# Patient Record
Sex: FEMALE | Race: WHITE | NOT HISPANIC OR LATINO | Employment: OTHER | URBAN - METROPOLITAN AREA
[De-identification: names, ages, dates, MRNs, and addresses within clinical notes are randomized per-mention and may not be internally consistent; named-entity substitution may affect disease eponyms.]

---

## 2017-08-14 ENCOUNTER — APPOINTMENT (OUTPATIENT)
Dept: RADIOLOGY | Facility: MEDICAL CENTER | Age: 57
End: 2017-08-14
Attending: EMERGENCY MEDICINE
Payer: MEDICARE

## 2017-08-14 ENCOUNTER — HOSPITAL ENCOUNTER (EMERGENCY)
Facility: MEDICAL CENTER | Age: 57
End: 2017-08-14
Attending: EMERGENCY MEDICINE
Payer: MEDICARE

## 2017-08-14 VITALS
SYSTOLIC BLOOD PRESSURE: 120 MMHG | BODY MASS INDEX: 24.56 KG/M2 | DIASTOLIC BLOOD PRESSURE: 89 MMHG | RESPIRATION RATE: 17 BRPM | OXYGEN SATURATION: 94 % | WEIGHT: 130.07 LBS | HEIGHT: 61 IN | TEMPERATURE: 98.4 F | HEART RATE: 83 BPM

## 2017-08-14 DIAGNOSIS — M54.50 RIGHT-SIDED LOW BACK PAIN WITHOUT SCIATICA, UNSPECIFIED CHRONICITY: ICD-10-CM

## 2017-08-14 PROCEDURE — 99283 EMERGENCY DEPT VISIT LOW MDM: CPT

## 2017-08-14 PROCEDURE — 71020 DX-CHEST-2 VIEWS: CPT

## 2017-08-14 ASSESSMENT — PAIN SCALES - GENERAL: PAINLEVEL_OUTOF10: 6

## 2017-08-14 NOTE — ED NOTES
"Pt amb to triage.  Chief Complaint   Patient presents with   • Back Pain     rt sided, mid back, intermittent xmonths, worse w/ palpation, \"I feel like there's a lump on my back.\"   • Weight Loss     progressive x7mo, after stopping prednisone, \"I've lost 50lbs in the past 7mo.\"     Pt asking if she can go out to smoke while waiting in the lobby. Pt encouraged to remain in waiting room. Triage process reviewed w/ pt.  "

## 2017-08-14 NOTE — ED PROVIDER NOTES
"ED Provider Note    CHIEF COMPLAINT  Chief Complaint   Patient presents with   • Back Pain     rt sided, mid back, intermittent xmonths, worse w/ palpation, \"I feel like there's a lump on my back.\"   • Weight Loss     progressive x7mo, after stopping prednisone, \"I've lost 50lbs in the past 7mo.\"       HPI  Sanam Andre is a 57 y.o. female who presents for evaluation of back pain.  The patient states that she's been having intermittent pain in her right lower posterior chest/rib area over the last couple months.  She states at times she feels like she feels a lesion or a cystic structure in that area.  Patient does have rheumatoid arthritis along with hepatitis C.  She indicates that she did have laboratory studies performed recently that were reviewed by her rheumatologist, Dr. Blackwood.  She was told that she had slight elevation of calcium as well as liver function tests.  The patient states she has had weight loss after she stopped using prednisone.  The patient denies: Fever, chills, cough, sputum, hemoptysis, vomiting, hematemesis, melena hematochezia, hematuria, dysuria.  No other acute symptomatology or complaints.    REVIEW OF SYSTEMS  See HPI for further details. All other systems negative.    PAST MEDICAL HISTORY  Past Medical History   Diagnosis Date   • Rheumatoid arthritis    • Hepatitis C    • Hypertension    • Arrhythmia      hx of left bundle branch block   • Pain      knees 7/10       FAMILY HISTORY  No family history on file.    SOCIAL HISTORY  Positive tobacco use; positive alcohol use;    SURGICAL HISTORY  Past Surgical History   Procedure Laterality Date   • Gyn surgery  2000     tubal ligation   • Other  1976     bunionectomy   • Knee arthroplasty total  9/15/2010     Performed by [facility] at SURGERY [facility]S       CURRENT MEDICATIONS  See nurses notes    ALLERGIES  Allergies   Allergen Reactions   • Codeine Vomiting   • Iodine Anaphylaxis     Reaction took place during " "CT and IV dye injection       PHYSICAL EXAM  VITAL SIGNS: /69 mmHg  Pulse 93  Temp(Src) 36.5 °C (97.7 °F)  Resp 18  Ht 1.549 m (5' 1\")  Wt 59 kg (130 lb 1.1 oz)  BMI 24.59 kg/m2  SpO2 94%   Constitutional: 57-year-old female, Well developed, Well nourished, No acute distress, Non-toxic appearance.   HENT: Normocephalic, Atraumatic, Nares:Clear, Oropharynx: moist, well hydrated, posterior pharynx:clear   Eyes: PERRL, EOMI, Conjunctiva normal, No discharge.   Neck: Normal range of motion, No tenderness, Supple, No stridor.   Lymphatic: No lymphadenopathy noted.   Cardiovascular: Regular rate and rhythm without mumurs, gallups, rubs   Thorax & Lungs: Normal Equal breath sounds, No respiratory distress, No wheezing, no stridor, no rales. No chest tenderness.   Abdomen: Soft, nontender, nondistended, no organomegaly, positive bowel sounds normal in quality. No guarding or rebound.  Pain pump identified in the left upper quadrant area;  Skin: Good skin turgor, pink, warm, dry. No rashes, petechiae, purpura. Normal capillary refill.   Back: Mild tenderness in the right lower posterior rib area; no masses palpated; No CVA tenderness.   Extremities: Intact distal pulses, No edema, No tenderness, No cyanosis,  Vascular: Pulses are 2+, symmetric in the upper and lower extremities.  Musculoskeletal: Diffuse arthritic changes. No tenderness to palpation or major deformities noted.   Neurologic: Alert & oriented x 3,  No gross focal deficits noted.   Psychiatric: Affect normal, Judgment normal, Mood normal.       RADIOLOGY/PROCEDURES  DX-CHEST-2 VIEWS   Final Result      1.  No evidence of acute cardiopulmonary process.            COURSE & MEDICAL DECISION MAKING  Pertinent Labs & Imaging studies reviewed. (See chart for details)  Discussion: At this time, the patient presents complaining of right posterior rib pain.  Chest x-ray showed no abnormalities.  Patient had recent laboratory studies and I do not see an " indication for repeating these.  I do not feel any subcutaneous masses or lesions.  At this time, the patient is stable for discharge.  I discussed the findings and treatment plan with the patient.  She indicates she is comfortable with this explanation and disposition.    FINAL IMPRESSION  1. Right-sided low back pain without sciatica, unspecified chronicity           PLAN  1.  Appropriate discharge instructions given  2.  The patient states she understands the need to follow-up with her primary care for monitoring      Electronically signed by: Guy G Gansert, 8/14/2017 3:42 PM

## 2017-08-14 NOTE — ED AVS SNAPSHOT
8/14/2017    Sanam Andre  [address] [place] [address]    Dear Sanam:    [facility] wants to ensure your discharge home is safe and you or your loved ones have had all of your questions answered regarding your care after you leave the hospital.    Below is a list of resources and contact information should you have any questions regarding your hospital stay, follow-up instructions, or active medical symptoms.    Questions or Concerns Regarding… Contact   Medical Questions Related to Your Discharge  (7 days a week, 8am-5pm) Contact a Nurse Care Coordinator   [phone]   Medical Questions Not Related to Your Discharge  (24 hours a day / 7 days a week)  Contact the Nurse Health Line   [phone]    Medications or Discharge Instructions Refer to your discharge packet   or contact your [facility] Primary Care Provider   [phone]   Follow-up Appointment(s) Schedule your appointment via [employer]   or contact Scheduling [phone]   Billing Review your statement via [employer]  or contact Billing [phone]   Medical Records Review your records via [employer]   or contact Medical Records [phone]     You may receive a telephone call within two days of discharge. This call is to make certain you understand your discharge instructions and have the opportunity to have any questions answered. You can also easily access your medical information, test results and upcoming appointments via the [employer] free online health management tool. You can learn more and sign up at [employer]. For assistance setting up your [employer] account, please call [phone].    Once again, we want to ensure your discharge home is safe and that you have a clear understanding of any next steps in your care. If you have any questions or concerns, please do not hesitate to contact us, we are here for you. Thank you for choosing [facility] for your healthcare needs.    Sincerely,    Your [facility] Healthcare Team

## 2017-08-15 NOTE — DISCHARGE INSTRUCTIONS
Back Pain, Adult  Back pain is very common in adults. The cause of back pain is rarely dangerous and the pain often gets better over time. The cause of your back pain may not be known. Some common causes of back pain include:  · Strain of the muscles or ligaments supporting the spine.  · Wear and tear (degeneration) of the spinal disks.  · Arthritis.  · Direct injury to the back.  For many people, back pain may return. Since back pain is rarely dangerous, most people can learn to manage this condition on their own.  HOME CARE INSTRUCTIONS  Watch your back pain for any changes. The following actions may help to lessen any discomfort you are feeling:  · Remain active. It is stressful on your back to sit or  one place for long periods of time. Do not sit, drive, or  one place for more than 30 minutes at a time. Take short walks on even surfaces as soon as you are able. Try to increase the length of time you walk each day.  · Exercise regularly as directed by your health care provider. Exercise helps your back heal faster. It also helps avoid future injury by keeping your muscles strong and flexible.  · Do not stay in bed. Resting more than 1-2 days can delay your recovery.  · Pay attention to your body when you bend and lift. The most comfortable positions are those that put less stress on your recovering back. Always use proper lifting techniques, including:  ¨ Bending your knees.  ¨ Keeping the load close to your body.  ¨ Avoiding twisting.  · Find a comfortable position to sleep. Use a firm mattress and lie on your side with your knees slightly bent. If you lie on your back, put a pillow under your knees.  · Avoid feeling anxious or stressed. Stress increases muscle tension and can worsen back pain. It is important to recognize when you are anxious or stressed and learn ways to manage it, such as with exercise.  · Take medicines only as directed by your health care provider. Over-the-counter  medicines to reduce pain and inflammation are often the most helpful. Your health care provider may prescribe muscle relaxant drugs. These medicines help dull your pain so you can more quickly return to your normal activities and healthy exercise.  · Apply ice to the injured area:  ¨ Put ice in a plastic bag.  ¨ Place a towel between your skin and the bag.  ¨ Leave the ice on for 20 minutes, 2-3 times a day for the first 2-3 days. After that, ice and heat may be alternated to reduce pain and spasms.  · Maintain a healthy weight. Excess weight puts extra stress on your back and makes it difficult to maintain good posture.  SEEK MEDICAL CARE IF:  · You have pain that is not relieved with rest or medicine.  · You have increasing pain going down into the legs or buttocks.  · You have pain that does not improve in one week.  · You have night pain.  · You lose weight.  · You have a fever or chills.  SEEK IMMEDIATE MEDICAL CARE IF:   · You develop new bowel or bladder control problems.  · You have unusual weakness or numbness in your arms or legs.  · You develop nausea or vomiting.  · You develop abdominal pain.  · You feel faint.     This information is not intended to replace advice given to you by your health care provider. Make sure you discuss any questions you have with your health care provider.     Document Released: 12/18/2006 Document Revised: 01/08/2016 Document Reviewed: 04/21/2015  [employer] Interactive Patient Education ©2016 [employer]

## 2021-08-29 ENCOUNTER — HOSPITAL ENCOUNTER (INPATIENT)
Facility: MEDICAL CENTER | Age: 61
LOS: 6 days | DRG: 177 | End: 2021-09-04
Attending: EMERGENCY MEDICINE | Admitting: INTERNAL MEDICINE
Payer: MEDICARE

## 2021-08-29 ENCOUNTER — APPOINTMENT (OUTPATIENT)
Dept: RADIOLOGY | Facility: MEDICAL CENTER | Age: 61
DRG: 177 | End: 2021-08-29
Attending: EMERGENCY MEDICINE
Payer: MEDICARE

## 2021-08-29 ENCOUNTER — APPOINTMENT (OUTPATIENT)
Dept: CARDIOLOGY | Facility: MEDICAL CENTER | Age: 61
DRG: 177 | End: 2021-08-29
Attending: INTERNAL MEDICINE
Payer: MEDICARE

## 2021-08-29 DIAGNOSIS — J96.01 ACUTE HYPOXEMIC RESPIRATORY FAILURE DUE TO COVID-19 (HCC): Primary | ICD-10-CM

## 2021-08-29 DIAGNOSIS — U07.1 ACUTE HYPOXEMIC RESPIRATORY FAILURE DUE TO COVID-19 (HCC): Primary | ICD-10-CM

## 2021-08-29 PROBLEM — R65.20 SEPSIS WITH ACUTE ORGAN DYSFUNCTION (HCC): Status: ACTIVE | Noted: 2021-08-29

## 2021-08-29 PROBLEM — D69.6 THROMBOCYTOPENIA (HCC): Status: ACTIVE | Noted: 2021-08-29

## 2021-08-29 PROBLEM — A41.9 SEPSIS WITH ACUTE ORGAN DYSFUNCTION (HCC): Status: ACTIVE | Noted: 2021-08-29

## 2021-08-29 PROBLEM — E87.6 HYPOKALEMIA: Status: ACTIVE | Noted: 2021-08-29

## 2021-08-29 PROBLEM — I24.9 ACS (ACUTE CORONARY SYNDROME) (HCC): Status: RESOLVED | Noted: 2021-08-29 | Resolved: 2021-08-29

## 2021-08-29 PROBLEM — I44.7 LBBB (LEFT BUNDLE BRANCH BLOCK): Status: ACTIVE | Noted: 2021-08-29

## 2021-08-29 PROBLEM — I24.9 ACS (ACUTE CORONARY SYNDROME) (HCC): Status: ACTIVE | Noted: 2021-08-29

## 2021-08-29 PROBLEM — F11.20 METHADONE DEPENDENCE (HCC): Status: ACTIVE | Noted: 2021-08-29

## 2021-08-29 LAB
ALBUMIN SERPL BCP-MCNC: 3.3 G/DL (ref 3.2–4.9)
ALBUMIN/GLOB SERPL: 0.7 G/DL
ALP SERPL-CCNC: 100 U/L (ref 30–99)
ALT SERPL-CCNC: 21 U/L (ref 2–50)
ANION GAP SERPL CALC-SCNC: 13 MMOL/L (ref 7–16)
APTT PPP: 34.5 SEC (ref 24.7–36)
AST SERPL-CCNC: 52 U/L (ref 12–45)
BASOPHILS # BLD AUTO: 0.4 % (ref 0–1.8)
BASOPHILS # BLD: 0.03 K/UL (ref 0–0.12)
BILIRUB SERPL-MCNC: 0.7 MG/DL (ref 0.1–1.5)
BUN SERPL-MCNC: 21 MG/DL (ref 8–22)
CALCIUM SERPL-MCNC: 8.9 MG/DL (ref 8.5–10.5)
CHLORIDE SERPL-SCNC: 99 MMOL/L (ref 96–112)
CO2 SERPL-SCNC: 24 MMOL/L (ref 20–33)
CREAT SERPL-MCNC: 0.67 MG/DL (ref 0.5–1.4)
D DIMER PPP IA.FEU-MCNC: 1.55 UG/ML (FEU) (ref 0–0.5)
EKG IMPRESSION: NORMAL
EOSINOPHIL # BLD AUTO: 0 K/UL (ref 0–0.51)
EOSINOPHIL NFR BLD: 0 % (ref 0–6.9)
ERYTHROCYTE [DISTWIDTH] IN BLOOD BY AUTOMATED COUNT: 48.7 FL (ref 35.9–50)
FLUAV RNA SPEC QL NAA+PROBE: NEGATIVE
FLUBV RNA SPEC QL NAA+PROBE: NEGATIVE
GLOBULIN SER CALC-MCNC: 4.6 G/DL (ref 1.9–3.5)
GLUCOSE SERPL-MCNC: 88 MG/DL (ref 65–99)
HCT VFR BLD AUTO: 48.8 % (ref 37–47)
HGB BLD-MCNC: 16.8 G/DL (ref 12–16)
IMM GRANULOCYTES # BLD AUTO: 0.06 K/UL (ref 0–0.11)
IMM GRANULOCYTES NFR BLD AUTO: 0.9 % (ref 0–0.9)
INR PPP: 1.07 (ref 0.87–1.13)
LACTATE BLD-SCNC: 1.8 MMOL/L (ref 0.5–2)
LACTATE BLD-SCNC: 2.1 MMOL/L (ref 0.5–2)
LV EJECT FRACT  99904: 45
LV EJECT FRACT MOD 2C 99903: 51.23
LV EJECT FRACT MOD 4C 99902: 43.01
LV EJECT FRACT MOD BP 99901: 47.08
LYMPHOCYTES # BLD AUTO: 1.09 K/UL (ref 1–4.8)
LYMPHOCYTES NFR BLD: 16 % (ref 22–41)
MCH RBC QN AUTO: 35.7 PG (ref 27–33)
MCHC RBC AUTO-ENTMCNC: 34.4 G/DL (ref 33.6–35)
MCV RBC AUTO: 103.8 FL (ref 81.4–97.8)
MONOCYTES # BLD AUTO: 0.61 K/UL (ref 0–0.85)
MONOCYTES NFR BLD AUTO: 9 % (ref 0–13.4)
NEUTROPHILS # BLD AUTO: 5.02 K/UL (ref 2–7.15)
NEUTROPHILS NFR BLD: 73.7 % (ref 44–72)
NRBC # BLD AUTO: 0 K/UL
NRBC BLD-RTO: 0 /100 WBC
NT-PROBNP SERPL IA-MCNC: 692 PG/ML (ref 0–125)
PLATELET # BLD AUTO: 146 K/UL (ref 164–446)
PMV BLD AUTO: 11.1 FL (ref 9–12.9)
POTASSIUM SERPL-SCNC: 3.3 MMOL/L (ref 3.6–5.5)
PROT SERPL-MCNC: 7.9 G/DL (ref 6–8.2)
PROTHROMBIN TIME: 13.6 SEC (ref 12–14.6)
RBC # BLD AUTO: 4.7 M/UL (ref 4.2–5.4)
RSV RNA SPEC QL NAA+PROBE: NEGATIVE
SARS-COV-2 RNA RESP QL NAA+PROBE: DETECTED
SODIUM SERPL-SCNC: 136 MMOL/L (ref 135–145)
SPECIMEN SOURCE: ABNORMAL
TROPONIN T SERPL-MCNC: 35 NG/L (ref 6–19)
UFH PPP CHRO-ACNC: <0.1 IU/ML
WBC # BLD AUTO: 6.8 K/UL (ref 4.8–10.8)

## 2021-08-29 PROCEDURE — 93005 ELECTROCARDIOGRAM TRACING: CPT | Performed by: EMERGENCY MEDICINE

## 2021-08-29 PROCEDURE — 85610 PROTHROMBIN TIME: CPT

## 2021-08-29 PROCEDURE — 83605 ASSAY OF LACTIC ACID: CPT

## 2021-08-29 PROCEDURE — 99223 1ST HOSP IP/OBS HIGH 75: CPT | Performed by: INTERNAL MEDICINE

## 2021-08-29 PROCEDURE — 770020 HCHG ROOM/CARE - TELE (206)

## 2021-08-29 PROCEDURE — 87077 CULTURE AEROBIC IDENTIFY: CPT

## 2021-08-29 PROCEDURE — 99285 EMERGENCY DEPT VISIT HI MDM: CPT

## 2021-08-29 PROCEDURE — 96375 TX/PRO/DX INJ NEW DRUG ADDON: CPT

## 2021-08-29 PROCEDURE — 71045 X-RAY EXAM CHEST 1 VIEW: CPT

## 2021-08-29 PROCEDURE — 93306 TTE W/DOPPLER COMPLETE: CPT

## 2021-08-29 PROCEDURE — 83880 ASSAY OF NATRIURETIC PEPTIDE: CPT

## 2021-08-29 PROCEDURE — C9803 HOPD COVID-19 SPEC COLLECT: HCPCS | Performed by: EMERGENCY MEDICINE

## 2021-08-29 PROCEDURE — 0241U HCHG SARS-COV-2 COVID-19 NFCT DS RESP RNA 4 TRGT MIC: CPT

## 2021-08-29 PROCEDURE — 96365 THER/PROPH/DIAG IV INF INIT: CPT

## 2021-08-29 PROCEDURE — 85730 THROMBOPLASTIN TIME PARTIAL: CPT

## 2021-08-29 PROCEDURE — 85379 FIBRIN DEGRADATION QUANT: CPT

## 2021-08-29 PROCEDURE — 87040 BLOOD CULTURE FOR BACTERIA: CPT | Mod: 91

## 2021-08-29 PROCEDURE — 700111 HCHG RX REV CODE 636 W/ 250 OVERRIDE (IP): Performed by: EMERGENCY MEDICINE

## 2021-08-29 PROCEDURE — 84484 ASSAY OF TROPONIN QUANT: CPT

## 2021-08-29 PROCEDURE — 96366 THER/PROPH/DIAG IV INF ADDON: CPT

## 2021-08-29 PROCEDURE — 80053 COMPREHEN METABOLIC PANEL: CPT

## 2021-08-29 PROCEDURE — 93306 TTE W/DOPPLER COMPLETE: CPT | Mod: 26 | Performed by: INTERNAL MEDICINE

## 2021-08-29 PROCEDURE — 85025 COMPLETE CBC W/AUTO DIFF WBC: CPT

## 2021-08-29 PROCEDURE — 93005 ELECTROCARDIOGRAM TRACING: CPT

## 2021-08-29 PROCEDURE — 87150 DNA/RNA AMPLIFIED PROBE: CPT

## 2021-08-29 PROCEDURE — 85520 HEPARIN ASSAY: CPT

## 2021-08-29 PROCEDURE — 99223 1ST HOSP IP/OBS HIGH 75: CPT | Mod: AI | Performed by: INTERNAL MEDICINE

## 2021-08-29 RX ORDER — HEPARIN SODIUM 5000 [USP'U]/100ML
0-30 INJECTION, SOLUTION INTRAVENOUS CONTINUOUS
Status: DISCONTINUED | OUTPATIENT
Start: 2021-08-29 | End: 2021-08-29

## 2021-08-29 RX ORDER — PROCHLORPERAZINE EDISYLATE 5 MG/ML
5-10 INJECTION INTRAMUSCULAR; INTRAVENOUS EVERY 4 HOURS PRN
Status: DISCONTINUED | OUTPATIENT
Start: 2021-08-29 | End: 2021-09-04 | Stop reason: HOSPADM

## 2021-08-29 RX ORDER — ONDANSETRON 4 MG/1
4 TABLET, ORALLY DISINTEGRATING ORAL EVERY 4 HOURS PRN
Status: DISCONTINUED | OUTPATIENT
Start: 2021-08-29 | End: 2021-09-04 | Stop reason: HOSPADM

## 2021-08-29 RX ORDER — DEXAMETHASONE SODIUM PHOSPHATE 4 MG/ML
6 INJECTION, SOLUTION INTRA-ARTICULAR; INTRALESIONAL; INTRAMUSCULAR; INTRAVENOUS; SOFT TISSUE ONCE
Status: COMPLETED | OUTPATIENT
Start: 2021-08-29 | End: 2021-08-29

## 2021-08-29 RX ORDER — ONDANSETRON 2 MG/ML
4 INJECTION INTRAMUSCULAR; INTRAVENOUS EVERY 4 HOURS PRN
Status: DISCONTINUED | OUTPATIENT
Start: 2021-08-29 | End: 2021-09-04 | Stop reason: HOSPADM

## 2021-08-29 RX ORDER — METHADONE HYDROCHLORIDE 10 MG/ML
86 CONCENTRATE ORAL DAILY
COMMUNITY
End: 2021-11-01

## 2021-08-29 RX ORDER — HEPARIN SODIUM 1000 [USP'U]/ML
40 INJECTION, SOLUTION INTRAVENOUS; SUBCUTANEOUS PRN
Status: DISCONTINUED | OUTPATIENT
Start: 2021-08-29 | End: 2021-08-29

## 2021-08-29 RX ORDER — PROMETHAZINE HYDROCHLORIDE 25 MG/1
12.5-25 TABLET ORAL EVERY 4 HOURS PRN
Status: DISCONTINUED | OUTPATIENT
Start: 2021-08-29 | End: 2021-09-04 | Stop reason: HOSPADM

## 2021-08-29 RX ORDER — HEPARIN SODIUM 1000 [USP'U]/ML
30 INJECTION, SOLUTION INTRAVENOUS; SUBCUTANEOUS PRN
Status: DISCONTINUED | OUTPATIENT
Start: 2021-08-29 | End: 2021-08-29

## 2021-08-29 RX ORDER — METHADONE HYDROCHLORIDE 10 MG/ML
87 CONCENTRATE ORAL DAILY
Status: DISCONTINUED | OUTPATIENT
Start: 2021-08-29 | End: 2021-08-29

## 2021-08-29 RX ORDER — POTASSIUM CHLORIDE 20 MEQ/1
40 TABLET, EXTENDED RELEASE ORAL ONCE
Status: ACTIVE | OUTPATIENT
Start: 2021-08-29 | End: 2021-08-30

## 2021-08-29 RX ORDER — BISACODYL 10 MG
10 SUPPOSITORY, RECTAL RECTAL
Status: DISCONTINUED | OUTPATIENT
Start: 2021-08-29 | End: 2021-09-04 | Stop reason: HOSPADM

## 2021-08-29 RX ORDER — ACETAMINOPHEN 325 MG/1
650 TABLET ORAL EVERY 6 HOURS PRN
Status: DISCONTINUED | OUTPATIENT
Start: 2021-08-29 | End: 2021-09-04 | Stop reason: HOSPADM

## 2021-08-29 RX ORDER — PROMETHAZINE HYDROCHLORIDE 25 MG/1
12.5-25 SUPPOSITORY RECTAL EVERY 4 HOURS PRN
Status: DISCONTINUED | OUTPATIENT
Start: 2021-08-29 | End: 2021-09-04 | Stop reason: HOSPADM

## 2021-08-29 RX ORDER — HEPARIN SODIUM 1000 [USP'U]/ML
60 INJECTION, SOLUTION INTRAVENOUS; SUBCUTANEOUS ONCE
Status: DISCONTINUED | OUTPATIENT
Start: 2021-08-29 | End: 2021-08-29

## 2021-08-29 RX ORDER — AMOXICILLIN 250 MG
2 CAPSULE ORAL 2 TIMES DAILY
Status: DISCONTINUED | OUTPATIENT
Start: 2021-08-29 | End: 2021-09-04 | Stop reason: HOSPADM

## 2021-08-29 RX ORDER — POLYETHYLENE GLYCOL 3350 17 G/17G
1 POWDER, FOR SOLUTION ORAL
Status: DISCONTINUED | OUTPATIENT
Start: 2021-08-29 | End: 2021-09-04 | Stop reason: HOSPADM

## 2021-08-29 RX ORDER — HEPARIN SODIUM 1000 [USP'U]/ML
80 INJECTION, SOLUTION INTRAVENOUS; SUBCUTANEOUS ONCE
Status: DISCONTINUED | OUTPATIENT
Start: 2021-08-29 | End: 2021-08-29

## 2021-08-29 RX ORDER — DEXAMETHASONE 4 MG/1
6 TABLET ORAL DAILY
Status: DISCONTINUED | OUTPATIENT
Start: 2021-08-29 | End: 2021-09-04 | Stop reason: HOSPADM

## 2021-08-29 RX ORDER — HEPARIN SODIUM 1000 [USP'U]/ML
80 INJECTION, SOLUTION INTRAVENOUS; SUBCUTANEOUS ONCE
Status: COMPLETED | OUTPATIENT
Start: 2021-08-29 | End: 2021-08-29

## 2021-08-29 RX ADMIN — HEPARIN SODIUM 18 UNITS/KG/HR: 5000 INJECTION, SOLUTION INTRAVENOUS at 15:36

## 2021-08-29 RX ADMIN — HEPARIN SODIUM 4700 UNITS: 1000 INJECTION, SOLUTION INTRAVENOUS; SUBCUTANEOUS at 15:36

## 2021-08-29 RX ADMIN — DEXAMETHASONE SODIUM PHOSPHATE 6 MG: 4 INJECTION, SOLUTION INTRA-ARTICULAR; INTRALESIONAL; INTRAMUSCULAR; INTRAVENOUS; SOFT TISSUE at 14:13

## 2021-08-29 ASSESSMENT — LIFESTYLE VARIABLES: SUBSTANCE_ABUSE: 0

## 2021-08-29 ASSESSMENT — ENCOUNTER SYMPTOMS
SEIZURES: 0
BLURRED VISION: 0
CHILLS: 0
SHORTNESS OF BREATH: 1
FALLS: 0
VOMITING: 1
PALPITATIONS: 0
SORE THROAT: 0
NAUSEA: 1
DEPRESSION: 0
FEVER: 0
COUGH: 0
LOSS OF CONSCIOUSNESS: 0

## 2021-08-29 ASSESSMENT — PATIENT HEALTH QUESTIONNAIRE - PHQ9
1. LITTLE INTEREST OR PLEASURE IN DOING THINGS: NOT AT ALL
SUM OF ALL RESPONSES TO PHQ9 QUESTIONS 1 AND 2: 0
2. FEELING DOWN, DEPRESSED, IRRITABLE, OR HOPELESS: NOT AT ALL

## 2021-08-29 NOTE — ED NOTES
Unable to obtain MedRec. Patient was unable to participate in interview. Patient's emergency contact did not  phone. Called [facility], [facility], [facility], and [facility] and none had prescriptions on file from the past year.     Previous note from RN states that patient takes methadone, could not confirm dosage, strength, or when patient last picked medication.     Previous medications on MedRec were from 2018. Cannot confirm accuracy of list.         Allergies:  Allergies   Allergen Reactions   • Codeine Vomiting   • Iodine Anaphylaxis     Reaction took place during CT and IV dye injection

## 2021-08-29 NOTE — ED PROVIDER NOTES
"ED Provider Note    CHIEF COMPLAINT  Chief Complaint   Patient presents with   • ALOC     Per family, pt has been \"more and more confused\" over the last few weeks. Pt is normally AOx4. Over the last 5 days, pt has gotten worse. Pt AOx2. Pt has no medical complaint currently and states she hasn't been around anyone sick. Pt has a hx of COPD and RA. Pt takes methadone for pain.        HPI  Sanam Andre is a 61 y.o. female who presents for increasing confusion cough hypoxia not feeling well.  The patient has a history of COPD and rheumatoid arthritis.  She is also on methadone.  She apparently has a known history of a left bundle branch block.  Family reports that she has been lethargic over the past several days without any focal weakness to the arms legs or face no trauma.  She reported severe dyspnea without chest pain.  EMS performed an EKG which demonstrated a left bundle branch block with possible ischemic morphology.  She has no known history of coronary artery disease.  He is unvaccinated against COVID-19    REVIEW OF SYSTEMS  See HPI for further details.  Positive for cough shortness of breath dyspnea all other systems are negative.     PAST MEDICAL HISTORY  Past Medical History:   Diagnosis Date   • Arrhythmia     hx of left bundle branch block   • Hepatitis C    • Hypertension    • Pain     knees 7/10   • Rheumatoid arthritis(714.0)        FAMILY HISTORY  Noncontributory    SOCIAL HISTORY  Social History     Socioeconomic History   • Marital status:      Spouse name: Not on file   • Number of children: Not on file   • Years of education: Not on file   • Highest education level: Not on file   Occupational History   • Not on file   Tobacco Use   • Smoking status: Light Tobacco Smoker   • Smokeless tobacco: Never Used   • Tobacco comment: 3/4 ppd for 34 years   Substance and Sexual Activity   • Alcohol use: No     Comment: 1 per week   • Drug use: No   • Sexual activity: Not on file   Other Topics " Concern   • Not on file   Social History Narrative   • Not on file     Social Determinants of Health     Financial Resource Strain:    • Difficulty of Paying Living Expenses:    Food Insecurity:    • Worried About Running Out of Food in the Last Year:    • Ran Out of Food in the Last Year:    Transportation Needs:    • Lack of Transportation (Medical):    • Lack of Transportation (Non-Medical):    Physical Activity:    • Days of Exercise per Week:    • Minutes of Exercise per Session:    Stress:    • Feeling of Stress :    Social Connections:    • Frequency of Communication with Friends and Family:    • Frequency of Social Gatherings with Friends and Family:    • Attends [employer] Services:    • Active Member of Clubs or Organizations:    • Attends Club or Organization Meetings:    • Marital Status:    Intimate Partner Violence:    • Fear of Current or Ex-Partner:    • Emotionally Abused:    • Physically Abused:    • Sexually Abused:      History of tobacco use  SURGICAL HISTORY  Past Surgical History:   Procedure Laterality Date   • KNEE ARTHROPLASTY TOTAL  9/15/2010    Performed by [facility] at SURGERY [facility]   • GYN SURGERY  2000    tubal ligation   • OTHER  1976    bunionectomy       CURRENT MEDICATIONS    Current Facility-Administered Medications:   •  heparin infusion 25,000 units in 500 mL 0.45% NACL, 0-30 Units/kg/hr, Intravenous, ContinuousRodriguez M.D.  •  heparin injection 4,700 Units, 80 Units/kg, Intravenous, OnceRodriguez M.D.  •  heparin injection 2,400 Units, 40 Units/kg, Intravenous, PRNRodriguez M.D.  •  heparin infusion 25,000 units in 500 mL 0.45% NACL, 0-30 Units/kg/hr, Intravenous, ContinuousRodriguez M.D.  •  heparin injection 4,700 Units, 80 Units/kg, Intravenous, OnceRodriguez M.D.  •  heparin injection 2,400 Units, 40 Units/kg, Intravenous, PRNRodriguez M.D.  •  heparin infusion 25,000 units in 500 mL 0.45% NACL, 0-30  "Units/kg/hr, Intravenous, Continuous, Rodriguez Ford M.D.  •  heparin injection 3,500 Units, 60 Units/kg, Intravenous, Once, Rodriguez Ford M.D.  •  heparin injection 1,800 Units, 30 Units/kg, Intravenous, PRN, Rodriguez Ford M.D.    Current Outpatient Medications:   •  OxyCODONE HCl (OXYCONTIN PO), Take 15 mg by mouth every 6 hours., Disp: , Rfl:   •  lisinopril (PRINIVIL) 20 MG Tab, Take 30 mg by mouth every day., Disp: , Rfl:   •  Etanercept (ENBREL) 25 MG Recon Soln, Inject  as instructed., Disp: , Rfl:   •  SulfaSALAzine (SULFAZINE PO), Take 3 Tabs by mouth., Disp: , Rfl:   •  lisinopril-hydrochlorothiazide (PRINZIDE, ZESTORETIC) 20-12.5 MG per tablet, Take 1 Tab by mouth every day., Disp: 30 Tab, Rfl: 1  •  alprazolam (XANAX XR) 0.5 MG XR tablet, Take 0.5 mg by mouth 2 times a day as needed., Disp: , Rfl:       ALLERGIES  Allergies   Allergen Reactions   • Codeine Vomiting   • Iodine Anaphylaxis     Reaction took place during CT and IV dye injection       PHYSICAL EXAM  VITAL SIGNS: /74   Pulse 79   Temp 36.4 °C (97.6 °F) (Temporal)   Resp (!) 26   Ht 1.575 m (5' 2\")   Wt 59 kg (130 lb)   SpO2 90%   BMI 23.78 kg/m²       Constitutional: Ill-appearing  HENT: Normocephalic, Atraumatic, Bilateral external ears normal, Oropharynx moist, No oral exudates, Nose normal.   Eyes: PERRLA, EOMI, Conjunctiva normal, No discharge.   Neck: Normal range of motion, No tenderness, Supple, No stridor.   Cardiovascular: Tachycardic, Normal rhythm, No murmurs, No rubs, No gallops.   Thorax & Lungs: Bibasilar rhonchi, No chest tenderness.   Abdomen: Bowel sounds normal, Soft, No tenderness, No masses, No pulsatile masses.   Skin: Warm, Dry, No erythema, No rash.   Back: No tenderness, No CVA tenderness.   Extremities: Intact distal pulses, No edema, No tenderness, No cyanosis, No clubbing.   Musculoskeletal: Good range of motion in all major joints. No tenderness to palpation or major deformities noted. "   Neurologic: Sluggish no seizure activity cranial nerves II through XII grossly intact no focal neurological deficit.  NIH stroke scale score is 0  Psychiatric: Sluggish      RADIOLOGY/PROCEDURES  DX-CHEST-PORTABLE (1 VIEW)   Final Result      Ill-defined right basilar opacities may represent atelectasis or consolidation. Pneumonia can be considered in the appropriate clinical settings. Trace right pleural effusion.      EC-ECHOCARDIOGRAM COMPLETE W/O CONT   Final Result        Results for orders placed or performed during the hospital encounter of 08/29/21   EC-ECHOCARDIOGRAM COMPLETE W/O CONT   Result Value Ref Range    Eject.Frac. MOD BP 47.08     Eject.Frac. MOD 4C 43.01     Eject.Frac. MOD 2C 51.23     Left Ventrical Ejection Fraction 45    CBC WITH DIFFERENTIAL   Result Value Ref Range    WBC 6.8 4.8 - 10.8 K/uL    RBC 4.70 4.20 - 5.40 M/uL    Hemoglobin 16.8 (H) 12.0 - 16.0 g/dL    Hematocrit 48.8 (H) 37.0 - 47.0 %    .8 (H) 81.4 - 97.8 fL    MCH 35.7 (H) 27.0 - 33.0 pg    MCHC 34.4 33.6 - 35.0 g/dL    RDW 48.7 35.9 - 50.0 fL    Platelet Count 146 (L) 164 - 446 K/uL    MPV 11.1 9.0 - 12.9 fL    Neutrophils-Polys 73.70 (H) 44.00 - 72.00 %    Lymphocytes 16.00 (L) 22.00 - 41.00 %    Monocytes 9.00 0.00 - 13.40 %    Eosinophils 0.00 0.00 - 6.90 %    Basophils 0.40 0.00 - 1.80 %    Immature Granulocytes 0.90 0.00 - 0.90 %    Nucleated RBC 0.00 /100 WBC    Neutrophils (Absolute) 5.02 2.00 - 7.15 K/uL    Lymphs (Absolute) 1.09 1.00 - 4.80 K/uL    Monos (Absolute) 0.61 0.00 - 0.85 K/uL    Eos (Absolute) 0.00 0.00 - 0.51 K/uL    Baso (Absolute) 0.03 0.00 - 0.12 K/uL    Immature Granulocytes (abs) 0.06 0.00 - 0.11 K/uL    NRBC (Absolute) 0.00 K/uL   COMP METABOLIC PANEL   Result Value Ref Range    Sodium 136 135 - 145 mmol/L    Potassium 3.3 (L) 3.6 - 5.5 mmol/L    Chloride 99 96 - 112 mmol/L    Co2 24 20 - 33 mmol/L    Anion Gap 13.0 7.0 - 16.0    Glucose 88 65 - 99 mg/dL    Bun 21 8 - 22 mg/dL    Creatinine  0.67 0.50 - 1.40 mg/dL    Calcium 8.9 8.5 - 10.5 mg/dL    AST(SGOT) 52 (H) 12 - 45 U/L    ALT(SGPT) 21 2 - 50 U/L    Alkaline Phosphatase 100 (H) 30 - 99 U/L    Total Bilirubin 0.7 0.1 - 1.5 mg/dL    Albumin 3.3 3.2 - 4.9 g/dL    Total Protein 7.9 6.0 - 8.2 g/dL    Globulin 4.6 (H) 1.9 - 3.5 g/dL    A-G Ratio 0.7 g/dL   LACTIC ACID   Result Value Ref Range    Lactic Acid 2.1 (H) 0.5 - 2.0 mmol/L   proBrain Natriuretic Peptide, NT   Result Value Ref Range    NT-proBNP 692 (H) 0 - 125 pg/mL   TROPONIN   Result Value Ref Range    Troponin T 35 (H) 6 - 19 ng/L   D-DIMER   Result Value Ref Range    D-Dimer Screen 1.55 (H) 0.00 - 0.50 ug/mL (FEU)   COV-2, FLU A/B, AND RSV BY PCR (2-4 HOURS [facility] - teamwork across global officesHEID): Collect NP swab in VTM    Specimen: Nasopharyngeal; Respirate   Result Value Ref Range    Influenza virus A RNA Negative Negative    Influenza virus B, PCR Negative Negative    RSV, PCR Negative Negative    SARS-CoV-2 by PCR DETECTED (AA)     SARS-CoV-2 Source NP Swab    ESTIMATED GFR   Result Value Ref Range    GFR If African American >60 >60 mL/min/1.73 m 2    GFR If Non African American >60 >60 mL/min/1.73 m 2   EKG (NOW)   Result Value Ref Range    Report       [facility] Emergency Dept.    Test Date:  2021  Pt Name:    EDGAR CLAROS                 Department: ER  MRN:        [id number]                      Room:       RD 01  Gender:     Female                       Technician: [id number]  :        1960                   Requested By:ER TRIAGE PROTOCOL  Order #:    [id number]                    Reading MD:    Measurements  Intervals                                Axis  Rate:       99                           P:          37  WA:         169                          QRS:        -66  QRSD:       157                          T:          108  QT:         385  QTc:        494    Interpretive Statements  Sinus rhythm  Probable left atrial enlargement  Left bundle branch block  ST elevation secondary to  IVCD  Compared to ECG 04/05/2008 06:51:39  Intraventricular conduction delay now present  ST (T wave) deviation now present     EKG interpretation by me rate 99 sinus rhythm left bundle branch block with possible ischemic morphology.  Code STEMI was activated.    COURSE & MEDICAL DECISION MAKING  Pertinent Labs & Imaging studies reviewed. (See chart for details)  Patient arrives for ill-appearing.  My colleague activated code STEMI based on left bundle branch morphology with new ST segment changes.  She did not explicitly have chest pain.  Patient had extensive work-up here.  Chest x-ray suggests pneumonitis with hypoxia.  Her D-dimer was elevated apparently the patient has a severe anaphylactic reaction to contrast.  Consultation with cardiology was immediately obtained with .  He has ordered a stat echocardiogram.  It does demonstrate possible regional wall motion abnormality but he would like to hold off on catheterization at this time and treat her medically.  She does have COVID-19 pneumonitis which is likely the underlying etiology for all of her symptoms.  Patient has hypoxia and evidence of acute coronary syndrome.  We will heparinize the patient and admit her to the intensive care unit in guarded condition    CRITICAL CARE TIME:    The patient required approximately 40 minutes worth of critical care time. This excludes any procedures. This includes time spent directly at caring for the patient, making critical medical decisions, involving consultants and speaking with the family.    FINAL IMPRESSION  1.  Covid pneumonitis with hypoxia  2.  Acute coronary syndrome  3.  Left bundle branch block      Electronically signed by: Rodriguez Ford M.D., 8/29/2021 12:34 PM

## 2021-08-29 NOTE — ASSESSMENT & PLAN NOTE
COVID positive  Continue decadron and remedies severe by ID  Case was discussed with ID on 9/1 since patient is not on high flow oxygen, not qualified for Toci  On 9/1 worsening oxygen requirement and mask 10 L  On 9/2 improving oxygen requirement on 6 L nasal cannula  Chest x-ray stable infiltration stable pneumonia  Arterial blood gas; showed acute respiratory failure

## 2021-08-29 NOTE — ASSESSMENT & PLAN NOTE
Echo demonstrated regional wall motion abnormality with EF of 45%.  Cardiology consulted, no intervention at this time follow-up as outpatient  Medical management at this time in setting of COVID - hypoxia

## 2021-08-29 NOTE — ED NOTES
MedRec partially complete per pharmacies and per patient's daughter: Lena Miller - [phone].     Patient's daughter states that she does not take any medications except methadone 87mg daily that she receives from [facility] in [place], [place] [phone]. Unable to verify dose, will attempt to call facility tomorrow when open.

## 2021-08-29 NOTE — ED TRIAGE NOTES
"Chief Complaint   Patient presents with   • ALOC     Per family, pt has been \"more and more confused\" over the last few weeks. Pt is normally AOx4. Over the last 5 days, pt has gotten worse. Pt AOx2. Pt has no medical complaint currently and states she hasn't been around anyone sick. Pt has a hx of COPD and RA. Pt takes methadone for pain.      /100   Pulse 98   Temp 36.4 °C (97.6 °F) (Temporal)   Resp 18   Ht 1.575 m (5' 2\")   Wt 59 kg (130 lb)   SpO2 92%   BMI 23.78 kg/m²     Pt was BIB EMS for the above complaint. Pt placed on monitor. Chart up for ERP.   "

## 2021-08-29 NOTE — CONSULTS
Cardiology Consult Note:    Christiano Edward M.D.  Date & Time note created:    8/29/2021   11:44 AM     Referring MD:  Dr. Ford    Patient ID:   Name:             Sanam Andre   YOB: 1960  Age:                 61 y.o.  female   MRN:               [id number]                                                             Chief Complaint / Reason for consult:  Dyspnea, STEMI activation.    History of Present Illness:    This is a 61 years old woman with no documented prior cardiac problems, presented to the hospital via EMS because of progressively worsening shortness of breath.  Patient does have prior history of hypertension and was on medication for that.  In the ER, she had an EKG which showed left bundle branch block sinus rhythm.  There is no prior EKG for comparison.  Patient is not having any active chest pain at this time.  She does report having persistent cough within the last few days.  She appears to be septic at this time.  There is no evidence clinically for STEMI activation.  We will cancel STEMI activation at this time.    I personally interpreted EKG tracing.  Review of Systems:      Constitutional: + not well.  Eyes: Denies changes in vision, no eye pain  Ears/Nose/Throat/Mouth: Denies nasal congestion or sore throat   Cardiovascular: no chest pain, no palpitations   Respiratory: yes shortness of breath , Denies cough  Gastrointestinal/Hepatic: Denies abdominal pain, nausea, vomiting, diarrhea, constipation or GI bleeding   Genitourinary: Denies dysuria or frequency  Musculoskeletal/Rheum: Denies  joint pain and swelling   Skin: Denies rash  Neurological: Denies headache, confusion, memory loss or focal weakness/parasthesias  Psychiatric: denies mood disorder   Endocrine: Yolie thyroid problems  Heme/Oncology/Lymph Nodes: Denies enlarged lymph nodes, denies brusing or known bleeding disorder  All other systems were reviewed and are negative (AMA/CMS criteria)                 Past Medical History:   Past Medical History:   Diagnosis Date   • Arrhythmia     hx of left bundle branch block   • Hepatitis C    • Hypertension    • Pain     knees 7/10   • Rheumatoid arthritis(714.0)      Active Hospital Problems    Diagnosis    • Sepsis with acute organ dysfunction (HCC) [A41.9, R65.20]        Past Surgical History:  Past Surgical History:   Procedure Laterality Date   • KNEE ARTHROPLASTY TOTAL  9/15/2010    Performed by [facility] at SURG[facility] ORS   • GYN SURGERY  2000    tubal ligation   • OTHER  1976    bunionectomy       Hospital Medications:  No current facility-administered medications for this encounter.    Current Outpatient Medications:   •  OxyCODONE HCl (OXYCONTIN PO), Take 15 mg by mouth every 6 hours., Disp: , Rfl:   •  lisinopril (PRINIVIL) 20 MG Tab, Take 30 mg by mouth every day., Disp: , Rfl:   •  Etanercept (ENBREL) 25 MG Recon Soln, Inject  as instructed., Disp: , Rfl:   •  SulfaSALAzine (SULFAZINE PO), Take 3 Tabs by mouth., Disp: , Rfl:   •  lisinopril-hydrochlorothiazide (PRINZIDE, ZESTORETIC) 20-12.5 MG per tablet, Take 1 Tab by mouth every day., Disp: 30 Tab, Rfl: 1  •  alprazolam (XANAX XR) 0.5 MG XR tablet, Take 0.5 mg by mouth 2 times a day as needed., Disp: , Rfl:     Current Outpatient Medications:  (Not in a hospital admission)      Medication Allergy:  Allergies   Allergen Reactions   • Codeine Vomiting   • Iodine Anaphylaxis     Reaction took place during CT and IV dye injection       Family History:  No family history on file.    Social History:  Social History     Socioeconomic History   • Marital status:      Spouse name: Not on file   • Number of children: Not on file   • Years of education: Not on file   • Highest education level: Not on file   Occupational History   • Not on file   Tobacco Use   • Smoking status: Light Tobacco Smoker   • Smokeless tobacco: Never Used   • Tobacco comment: 3/4 ppd for 34 years   Substance and  "Sexual Activity   • Alcohol use: No     Comment: 1 per week   • Drug use: No   • Sexual activity: Not on file   Other Topics Concern   • Not on file   Social History Narrative   • Not on file     Social Determinants of Health     Financial Resource Strain:    • Difficulty of Paying Living Expenses:    Food Insecurity:    • Worried About Running Out of Food in the Last Year:    • Ran Out of Food in the Last Year:    Transportation Needs:    • Lack of Transportation (Medical):    • Lack of Transportation (Non-Medical):    Physical Activity:    • Days of Exercise per Week:    • Minutes of Exercise per Session:    Stress:    • Feeling of Stress :    Social Connections:    • Frequency of Communication with Friends and Family:    • Frequency of Social Gatherings with Friends and Family:    • Attends Sabianism Services:    • Active Member of Clubs or Organizations:    • Attends Club or Organization Meetings:    • Marital Status:    Intimate Partner Violence:    • Fear of Current or Ex-Partner:    • Emotionally Abused:    • Physically Abused:    • Sexually Abused:          Physical Exam:  Vitals/ General Appearance:   Weight/BMI: Body mass index is 23.78 kg/m².  /100   Pulse 98   Temp 36.4 °C (97.6 °F) (Temporal)   Resp 18   Ht 1.575 m (5' 2\")   Wt 59 kg (130 lb)   SpO2 92%   Vitals:    08/29/21 1110 08/29/21 1115   BP:  137/100   Pulse:  98   Resp:  18   Temp:  36.4 °C (97.6 °F)   TempSrc:  Temporal   SpO2:  92%   Weight: 59 kg (130 lb)    Height: 1.575 m (5' 2\")      Oxygen Therapy:  Pulse Oximetry: 92 %, O2 (LPM): 5, O2 Delivery Device: Nasal Cannula    Constitutional:   + acute distress  HENMT:  Normocephalic, Atraumatic, Oropharynx moist mucous membranes, No oral exudates, Nose normal.  No thyromegaly.  Eyes:  EOMI, Conjunctiva normal, No discharge.  Neck:  Normal range of motion, No cervical tenderness,  no JVD.  Cardiovascular:  tachycardic, Normal rhythm.  Extremitites with no cyanosis, or edema.  Lungs: "  Normal breath sounds, breath sounds clear to auscultation bilaterally,  no rales, no rhonchi, no wheezing.   Abdomen: Bowel sounds normal, Soft, No tenderness, No guarding, No rebound, No masses, No hepatosplenomegaly.  Skin: Warm, Dry, No erythema, No rash, no induration.  Neurologic:  No focal deficits noted, cranial nerves II through X are intact. Somewhat confused.  Psychiatric: confused.      MDM (Data Review):     Records reviewed and summarized in current documentation    Lab Data Review:  Recent Results (from the past 24 hour(s))   EKG (NOW)    Collection Time: 21 11:09 AM   Result Value Ref Range    Report       [facility]    Test Date:  2021  Pt Name:    EDGAR CLAROS                 Department: ER  MRN:        [id number]                      Room:        01  Gender:     Female                       Technician: [id number]  :        1960                   Requested By:ER TRIAGE PROTOCOL  Order #:    [id number]                    Reading MD:    Measurements  Intervals                                Axis  Rate:       99                           P:          37  AL:         169                          QRS:        -66  QRSD:       157                          T:          108  QT:         385  QTc:        494    Interpretive Statements  Sinus rhythm  Probable left atrial enlargement  Left bundle branch block  ST elevation secondary to IVCD  Compared to ECG 2008 06:51:39  Intraventricular conduction delay now present  ST (T wave) deviation now present         Imaging/Procedures Review:    Chest Xray:  Reviewed    EKG:   As in HPI.     MDM (Assessment and Plan):     Active Hospital Problems    Diagnosis    • Sepsis with acute organ dysfunction (HCC) [A41.9, R65.20]          At this time, there is no clinical evidence of STEMI activation.  We will cancel it.  Suspect sepsis, rule out Covid infection.  Septic treatment via protocol.  We will get a bedside  transthoracic echo to assess cardiac function and valvular function.  No plan for further invasive cardiac work-up at this time.        Christiano Edward MD.   Cardiology Inpatient Service.  [facility].  [phone].  Zamzam Tello.

## 2021-08-29 NOTE — ASSESSMENT & PLAN NOTE
As identified on hold EKG  Cardiology did not think it is a STEMI  No chest pain and troponin trending down  Patient is to follow-up with heart failure clinic as outpatient

## 2021-08-29 NOTE — ED NOTES
Family Contact Information:    Daughter Lena Miller [phone]  Daughter Rivera Silva [phone]

## 2021-08-29 NOTE — H&P
"Hospital Medicine History & Physical Note    Date of Service  8/29/2021    Primary Care Physician  [facility].    Consultants  cardiology    Specialist Names: Dr. Edward    Code Status  Full Code    Chief Complaint  Chief Complaint   Patient presents with   • ALOC     Per family, pt has been \"more and more confused\" over the last few weeks. Pt is normally AOx4. Over the last 5 days, pt has gotten worse. Pt AOx2. Pt has no medical complaint currently and states she hasn't been around anyone sick. Pt has a hx of COPD and RA. Pt takes methadone for pain.        History of Presenting Illness  Sanam Andre is a 61 y.o. female who presented 8/29/2021 with shortness of breath.  Medical history is significant for COPD, RA, chronic methadone.  Patient is reported to have worsening shortness of breath over the last several days.  Patient also endorses nausea, vomiting and general malaise.  Patient's family states that patient has been more lethargic over the last several days and has had increased generalized weakness.  She was brought in by EMS for worsening shortness of breath.  EKG demonstrated left bundle branch block.  Cardiology was consulted.  Echo demonstrated regional wall motion abnormality with EF of 45%. CXR demonstrated right sided opacity.  Will admit to tele and treat for underlying COVID pneumonia.       Review of Systems  Review of Systems   Constitutional: Negative for chills and fever.   HENT: Negative for congestion and sore throat.    Eyes: Negative for blurred vision.   Respiratory: Positive for shortness of breath. Negative for cough.    Cardiovascular: Negative for chest pain and palpitations.   Gastrointestinal: Positive for nausea and vomiting.   Genitourinary: Negative for dysuria and frequency.   Musculoskeletal: Negative for falls.   Neurological: Negative for seizures and loss of consciousness.   Psychiatric/Behavioral: Negative for depression and substance abuse.       Past Medical " History   has a past medical history of Arrhythmia, Hepatitis C, Hypertension, Pain, and Rheumatoid arthritis(714.0).    Surgical History   has a past surgical history that includes gyn surgery (2000); other (1976); and knee arthroplasty total (9/15/2010).     Family History  family history is not on file.   Family history reviewed with patient. There is no family history that is pertinent to the chief complaint.     Social History   reports that she has been smoking. She has never used smokeless tobacco. She reports that she does not drink alcohol and does not use drugs.    Allergies  Allergies   Allergen Reactions   • Codeine Vomiting   • Iodine Anaphylaxis     Reaction took place during CT and IV dye injection       Medications  Prior to Admission Medications   Prescriptions Last Dose Informant Patient Reported? Taking?   methadone (DOLOPHINE) 10 MG/ML Conc UNK at UNK  Yes Yes   Sig: Take 87 mg by mouth every day. DOSE NOT VERIFIED      Facility-Administered Medications: None       Physical Exam  Temp:  [36.4 °C (97.6 °F)] 36.4 °C (97.6 °F)  Pulse:  [61-98] 80  Resp:  [15-35] 20  BP: (102-155)/() 155/90  SpO2:  [88 %-95 %] 93 %    Physical Exam  Constitutional:       General: She is not in acute distress.     Appearance: She is ill-appearing. She is not toxic-appearing.   HENT:      Head: Normocephalic.      Right Ear: External ear normal.      Left Ear: External ear normal.      Nose: Nose normal.      Mouth/Throat:      Mouth: Mucous membranes are dry.      Pharynx: No oropharyngeal exudate.   Eyes:      General: No scleral icterus.     Pupils: Pupils are equal, round, and reactive to light.   Cardiovascular:      Rate and Rhythm: Normal rate and regular rhythm.      Heart sounds: No murmur heard.     Pulmonary:      Breath sounds: No wheezing.      Comments: Crackles auscultated in right lower lung base  Abdominal:      Palpations: Abdomen is soft.      Tenderness: There is no abdominal tenderness. There  is no guarding or rebound.   Musculoskeletal:         General: No swelling or deformity.   Skin:     Coloration: Skin is not jaundiced.      Findings: No bruising.   Neurological:      General: No focal deficit present.      Mental Status: She is alert and oriented to person, place, and time.      Motor: No weakness.   Psychiatric:         Mood and Affect: Mood normal.         Behavior: Behavior normal.         Thought Content: Thought content normal.         Judgment: Judgment normal.         Laboratory:  Recent Labs     08/29/21  1131   WBC 6.8   RBC 4.70   HEMOGLOBIN 16.8*   HEMATOCRIT 48.8*   .8*   MCH 35.7*   MCHC 34.4   RDW 48.7   PLATELETCT 146*   MPV 11.1     Recent Labs     08/29/21  1131   SODIUM 136   POTASSIUM 3.3*   CHLORIDE 99   CO2 24   GLUCOSE 88   BUN 21   CREATININE 0.67   CALCIUM 8.9     Recent Labs     08/29/21  1131   ALTSGPT 21   ASTSGOT 52*   ALKPHOSPHAT 100*   TBILIRUBIN 0.7   GLUCOSE 88     Recent Labs     08/29/21  1420   APTT 34.5   INR 1.07     Recent Labs     08/29/21  1131   NTPROBNP 692*         Recent Labs     08/29/21  1131   TROPONINT 35*       Imaging:  DX-CHEST-PORTABLE (1 VIEW)   Final Result      Ill-defined right basilar opacities may represent atelectasis or consolidation. Pneumonia can be considered in the appropriate clinical settings. Trace right pleural effusion.      EC-ECHOCARDIOGRAM COMPLETE W/O CONT   Final Result          X-Ray:  I have personally reviewed the images and compared with prior images.  EKG:  I have personally reviewed the images and compared with prior images.    Assessment/Plan:  I anticipate this patient will require at least two midnights for appropriate medical management, necessitating inpatient admission.    Acute hypoxemic respiratory failure due to COVID-19 ([facility])  Assessment & Plan  COVID positive  No leukocytosis  Check procal in AM  CXR with right sided consolidation  Encourage proning, IS  5L nasal cannula  Decadron 6mg x10  days    Methadone dependence (HCC)  Assessment & Plan  Continue home dose    LBBB (left bundle branch block)  Assessment & Plan  As identified on EKG  Cardio following     Thrombocytopenia (HCC)  Assessment & Plan    monitor    Hypokalemia  Assessment & Plan  Potassium 3.3  Monitor and replace      VTE prophylaxis: SCDs/TEDs and Lovenox ppx

## 2021-08-30 ENCOUNTER — APPOINTMENT (OUTPATIENT)
Dept: RADIOLOGY | Facility: MEDICAL CENTER | Age: 61
DRG: 177 | End: 2021-08-30
Attending: HOSPITALIST
Payer: MEDICARE

## 2021-08-30 PROBLEM — D72.819 LEUKOPENIA: Status: ACTIVE | Noted: 2021-08-30

## 2021-08-30 PROBLEM — D75.89 MACROCYTOSIS: Status: ACTIVE | Noted: 2021-08-30

## 2021-08-30 PROBLEM — R79.89 ELEVATED TROPONIN: Status: ACTIVE | Noted: 2021-08-30

## 2021-08-30 LAB
ALBUMIN SERPL BCP-MCNC: 3.1 G/DL (ref 3.2–4.9)
ALBUMIN/GLOB SERPL: 0.7 G/DL
ALP SERPL-CCNC: 94 U/L (ref 30–99)
ALT SERPL-CCNC: 21 U/L (ref 2–50)
ANION GAP SERPL CALC-SCNC: 14 MMOL/L (ref 7–16)
ANISOCYTOSIS BLD QL SMEAR: ABNORMAL
AST SERPL-CCNC: 41 U/L (ref 12–45)
BASOPHILS # BLD AUTO: 0 % (ref 0–1.8)
BASOPHILS # BLD: 0 K/UL (ref 0–0.12)
BILIRUB SERPL-MCNC: 0.5 MG/DL (ref 0.1–1.5)
BUN SERPL-MCNC: 21 MG/DL (ref 8–22)
CALCIUM SERPL-MCNC: 9.7 MG/DL (ref 8.5–10.5)
CHLORIDE SERPL-SCNC: 102 MMOL/L (ref 96–112)
CO2 SERPL-SCNC: 23 MMOL/L (ref 20–33)
CREAT SERPL-MCNC: 0.52 MG/DL (ref 0.5–1.4)
CRP SERPL HS-MCNC: 7.06 MG/DL (ref 0–0.75)
D DIMER PPP IA.FEU-MCNC: 1.45 UG/ML (FEU) (ref 0–0.5)
EOSINOPHIL # BLD AUTO: 0 K/UL (ref 0–0.51)
EOSINOPHIL NFR BLD: 0 % (ref 0–6.9)
ERYTHROCYTE [DISTWIDTH] IN BLOOD BY AUTOMATED COUNT: 49.3 FL (ref 35.9–50)
GLOBULIN SER CALC-MCNC: 4.6 G/DL (ref 1.9–3.5)
GLUCOSE SERPL-MCNC: 127 MG/DL (ref 65–99)
HCT VFR BLD AUTO: 48 % (ref 37–47)
HGB BLD-MCNC: 16.4 G/DL (ref 12–16)
LYMPHOCYTES # BLD AUTO: 0.4 K/UL (ref 1–4.8)
LYMPHOCYTES NFR BLD: 14.8 % (ref 22–41)
MACROCYTES BLD QL SMEAR: ABNORMAL
MAGNESIUM SERPL-MCNC: 2.1 MG/DL (ref 1.5–2.5)
MANUAL DIFF BLD: NORMAL
MCH RBC QN AUTO: 35.7 PG (ref 27–33)
MCHC RBC AUTO-ENTMCNC: 34.2 G/DL (ref 33.6–35)
MCV RBC AUTO: 104.6 FL (ref 81.4–97.8)
METAMYELOCYTES NFR BLD MANUAL: 0.9 %
MONOCYTES # BLD AUTO: 0.28 K/UL (ref 0–0.85)
MONOCYTES NFR BLD AUTO: 10.4 % (ref 0–13.4)
MORPHOLOGY BLD-IMP: NORMAL
MYELOCYTES NFR BLD MANUAL: 2.6 %
NEUTROPHILS # BLD AUTO: 1.93 K/UL (ref 2–7.15)
NEUTROPHILS NFR BLD: 71.3 % (ref 44–72)
NRBC # BLD AUTO: 0 K/UL
NRBC BLD-RTO: 0 /100 WBC
PHOSPHATE SERPL-MCNC: 3.4 MG/DL (ref 2.5–4.5)
PLATELET # BLD AUTO: 146 K/UL (ref 164–446)
PLATELET BLD QL SMEAR: NORMAL
PMV BLD AUTO: 11.4 FL (ref 9–12.9)
POTASSIUM SERPL-SCNC: 3.5 MMOL/L (ref 3.6–5.5)
PROCALCITONIN SERPL-MCNC: 0.21 NG/ML
PROT SERPL-MCNC: 7.7 G/DL (ref 6–8.2)
RBC # BLD AUTO: 4.59 M/UL (ref 4.2–5.4)
RBC BLD AUTO: PRESENT
SODIUM SERPL-SCNC: 139 MMOL/L (ref 135–145)
TROPONIN T SERPL-MCNC: 17 NG/L (ref 6–19)
VIT B12 SERPL-MCNC: >4000 PG/ML (ref 211–911)
WBC # BLD AUTO: 2.7 K/UL (ref 4.8–10.8)

## 2021-08-30 PROCEDURE — XW033E5 INTRODUCTION OF REMDESIVIR ANTI-INFECTIVE INTO PERIPHERAL VEIN, PERCUTANEOUS APPROACH, NEW TECHNOLOGY GROUP 5: ICD-10-PCS | Performed by: INTERNAL MEDICINE

## 2021-08-30 PROCEDURE — 700102 HCHG RX REV CODE 250 W/ 637 OVERRIDE(OP): Performed by: HOSPITALIST

## 2021-08-30 PROCEDURE — 83735 ASSAY OF MAGNESIUM: CPT

## 2021-08-30 PROCEDURE — 84145 PROCALCITONIN (PCT): CPT

## 2021-08-30 PROCEDURE — 84100 ASSAY OF PHOSPHORUS: CPT

## 2021-08-30 PROCEDURE — 85027 COMPLETE CBC AUTOMATED: CPT

## 2021-08-30 PROCEDURE — 82607 VITAMIN B-12: CPT

## 2021-08-30 PROCEDURE — A9270 NON-COVERED ITEM OR SERVICE: HCPCS | Performed by: HOSPITALIST

## 2021-08-30 PROCEDURE — 700101 HCHG RX REV CODE 250: Performed by: INTERNAL MEDICINE

## 2021-08-30 PROCEDURE — 93970 EXTREMITY STUDY: CPT

## 2021-08-30 PROCEDURE — 700111 HCHG RX REV CODE 636 W/ 250 OVERRIDE (IP): Performed by: INTERNAL MEDICINE

## 2021-08-30 PROCEDURE — 80053 COMPREHEN METABOLIC PANEL: CPT

## 2021-08-30 PROCEDURE — 99233 SBSQ HOSP IP/OBS HIGH 50: CPT | Performed by: HOSPITALIST

## 2021-08-30 PROCEDURE — 86140 C-REACTIVE PROTEIN: CPT

## 2021-08-30 PROCEDURE — A9270 NON-COVERED ITEM OR SERVICE: HCPCS | Performed by: INTERNAL MEDICINE

## 2021-08-30 PROCEDURE — 84484 ASSAY OF TROPONIN QUANT: CPT

## 2021-08-30 PROCEDURE — 36415 COLL VENOUS BLD VENIPUNCTURE: CPT

## 2021-08-30 PROCEDURE — 85379 FIBRIN DEGRADATION QUANT: CPT

## 2021-08-30 PROCEDURE — 700105 HCHG RX REV CODE 258: Performed by: INTERNAL MEDICINE

## 2021-08-30 PROCEDURE — 700102 HCHG RX REV CODE 250 W/ 637 OVERRIDE(OP): Performed by: INTERNAL MEDICINE

## 2021-08-30 PROCEDURE — 770020 HCHG ROOM/CARE - TELE (206)

## 2021-08-30 PROCEDURE — 85007 BL SMEAR W/DIFF WBC COUNT: CPT

## 2021-08-30 RX ORDER — METHADONE HYDROCHLORIDE 10 MG/1
30 TABLET ORAL DAILY
Status: DISCONTINUED | OUTPATIENT
Start: 2021-08-30 | End: 2021-08-31

## 2021-08-30 RX ORDER — LISINOPRIL 5 MG/1
2.5 TABLET ORAL
Status: DISCONTINUED | OUTPATIENT
Start: 2021-08-30 | End: 2021-09-04 | Stop reason: HOSPADM

## 2021-08-30 RX ORDER — GUAIFENESIN 600 MG/1
600 TABLET, EXTENDED RELEASE ORAL EVERY 12 HOURS
Status: DISCONTINUED | OUTPATIENT
Start: 2021-08-30 | End: 2021-09-04 | Stop reason: HOSPADM

## 2021-08-30 RX ORDER — BENZONATATE 100 MG/1
100 CAPSULE ORAL 3 TIMES DAILY
Status: DISCONTINUED | OUTPATIENT
Start: 2021-08-30 | End: 2021-09-04 | Stop reason: HOSPADM

## 2021-08-30 RX ADMIN — DEXAMETHASONE 6 MG: 4 TABLET ORAL at 06:25

## 2021-08-30 RX ADMIN — BENZONATATE 100 MG: 100 CAPSULE ORAL at 09:30

## 2021-08-30 RX ADMIN — METHADONE HYDROCHLORIDE 30 MG: 10 TABLET ORAL at 17:54

## 2021-08-30 RX ADMIN — REMDESIVIR 200 MG: 100 INJECTION, POWDER, LYOPHILIZED, FOR SOLUTION INTRAVENOUS at 11:38

## 2021-08-30 RX ADMIN — ENOXAPARIN SODIUM 40 MG: 40 INJECTION SUBCUTANEOUS at 06:25

## 2021-08-30 RX ADMIN — GUAIFENESIN 600 MG: 600 TABLET, EXTENDED RELEASE ORAL at 09:30

## 2021-08-30 RX ADMIN — DOCUSATE SODIUM 50 MG AND SENNOSIDES 8.6 MG 2 TABLET: 8.6; 5 TABLET, FILM COATED ORAL at 06:24

## 2021-08-30 RX ADMIN — LISINOPRIL 2.5 MG: 5 TABLET ORAL at 17:54

## 2021-08-30 RX ADMIN — BENZONATATE 100 MG: 100 CAPSULE ORAL at 13:03

## 2021-08-30 RX ADMIN — GUAIFENESIN 600 MG: 600 TABLET, EXTENDED RELEASE ORAL at 17:55

## 2021-08-30 RX ADMIN — BENZONATATE 100 MG: 100 CAPSULE ORAL at 18:00

## 2021-08-30 ASSESSMENT — LIFESTYLE VARIABLES
TOTAL SCORE: 2
EVER FELT BAD OR GUILTY ABOUT YOUR DRINKING: YES
TOTAL SCORE: 2
AVERAGE NUMBER OF DAYS PER WEEK YOU HAVE A DRINK CONTAINING ALCOHOL: 5
DOES PATIENT WANT TO STOP DRINKING: CANNOT ASSESS
TOTAL SCORE: 4
EVER HAD A DRINK FIRST THING IN THE MORNING TO STEADY YOUR NERVES TO GET RID OF A HANGOVER: YES
TOTAL SCORE: 4
ON A TYPICAL DAY WHEN YOU DRINK ALCOHOL HOW MANY DRINKS DO YOU HAVE: 12
TOTAL SCORE: 4
HAVE PEOPLE ANNOYED YOU BY CRITICIZING YOUR DRINKING: NO
ALCOHOL_USE: YES
DOES PATIENT WANT TO STOP DRINKING: CANNOT ASSESS
CONSUMPTION TOTAL: POSITIVE
EVER FELT BAD OR GUILTY ABOUT YOUR DRINKING: YES
HAVE YOU EVER FELT YOU SHOULD CUT DOWN ON YOUR DRINKING: NO
EVER HAD A DRINK FIRST THING IN THE MORNING TO STEADY YOUR NERVES TO GET RID OF A HANGOVER: YES
TOTAL SCORE: 2
ON A TYPICAL DAY WHEN YOU DRINK ALCOHOL HOW MANY DRINKS DO YOU HAVE: 3
HAVE YOU EVER FELT YOU SHOULD CUT DOWN ON YOUR DRINKING: YES
ALCOHOL_USE: YES
CONSUMPTION TOTAL: INCOMPLETE
AVERAGE NUMBER OF DAYS PER WEEK YOU HAVE A DRINK CONTAINING ALCOHOL: 7
HOW MANY TIMES IN THE PAST YEAR HAVE YOU HAD 5 OR MORE DRINKS IN A DAY: 365
HAVE PEOPLE ANNOYED YOU BY CRITICIZING YOUR DRINKING: YES

## 2021-08-30 ASSESSMENT — ENCOUNTER SYMPTOMS
SHORTNESS OF BREATH: 1
DEPRESSION: 0
DOUBLE VISION: 0
HEADACHES: 0
DIZZINESS: 0
BLURRED VISION: 0
WHEEZING: 0
VOMITING: 0
BRUISES/BLEEDS EASILY: 0
CLAUDICATION: 0
BACK PAIN: 0
COUGH: 1
NAUSEA: 0
PALPITATIONS: 0
HEMOPTYSIS: 0
MYALGIAS: 0
CHILLS: 0
HEARTBURN: 0
FEVER: 0

## 2021-08-30 ASSESSMENT — COGNITIVE AND FUNCTIONAL STATUS - GENERAL
STANDING UP FROM CHAIR USING ARMS: A LOT
DAILY ACTIVITIY SCORE: 17
DRESSING REGULAR LOWER BODY CLOTHING: A LOT
SUGGESTED CMS G CODE MODIFIER MOBILITY: CL
MOVING TO AND FROM BED TO CHAIR: A LOT
TURNING FROM BACK TO SIDE WHILE IN FLAT BAD: A LITTLE
SUGGESTED CMS G CODE MODIFIER DAILY ACTIVITY: CK
MOBILITY SCORE: 13
DRESSING REGULAR UPPER BODY CLOTHING: A LITTLE
PERSONAL GROOMING: A LITTLE
WALKING IN HOSPITAL ROOM: A LOT
MOVING FROM LYING ON BACK TO SITTING ON SIDE OF FLAT BED: A LOT
TOILETING: A LOT
HELP NEEDED FOR BATHING: A LITTLE
CLIMB 3 TO 5 STEPS WITH RAILING: A LOT

## 2021-08-30 ASSESSMENT — FIBROSIS 4 INDEX: FIB4 SCORE: 4.82

## 2021-08-30 ASSESSMENT — PAIN DESCRIPTION - PAIN TYPE: TYPE: ACUTE PAIN

## 2021-08-30 NOTE — PROGRESS NOTES
Hospital Medicine Daily Progress Note    Date of Service  8/30/2021    Chief Complaint  Sanam Andre is a 61 y.o. female admitted 8/29/2021 with COVID 19 pna    Hospital Course  Sanam Andre is a 61 y.o. female who presented 8/29/2021 with shortness of breath.  Medical history is significant for COPD, RA, chronic methadone.  Patient is reported to have worsening shortness of breath over the last several days.  Patient also endorses nausea, vomiting and general malaise.  Patient's family states that patient has been more lethargic over the last several days and has had increased generalized weakness.  She was brought in by EMS for worsening shortness of breath.  EKG demonstrated left bundle branch block.  Cardiology was consulted.  Echo demonstrated regional wall motion abnormality with EF of 45%. CXR demonstrated right sided opacity.  Will admit to tele and treat for underlying COVID pneumonia.       Interval Problem Update  8/30 no fever or chills no n/v/d/c still sob, malaise, weakness, cough stable no hemoptysis.         Consultants/Specialty  infectious disease     Code Status  Full Code    Disposition  Patient is not medically cleared.   Anticipate discharge to to home with organized home healthcare and close outpatient follow-up.  I have placed the appropriate orders for post-discharge needs.    Review of Systems  Review of Systems   Constitutional: Positive for malaise/fatigue. Negative for chills and fever.   HENT: Negative for congestion and nosebleeds.    Eyes: Negative for blurred vision and double vision.   Respiratory: Positive for cough and shortness of breath. Negative for hemoptysis and wheezing.    Cardiovascular: Negative for chest pain, palpitations and claudication.   Gastrointestinal: Negative for heartburn, nausea and vomiting.   Genitourinary: Negative for hematuria and urgency.   Musculoskeletal: Negative for back pain and myalgias.   Skin: Negative for rash.   Neurological:  Negative for dizziness and headaches.   Endo/Heme/Allergies: Does not bruise/bleed easily.   Psychiatric/Behavioral: Negative for depression.        Physical Exam  Temp:  [36.3 °C (97.3 °F)-36.9 °C (98.5 °F)] 36.3 °C (97.3 °F)  Pulse:  [61-80] 64  Resp:  [15-35] 18  BP: (110-155)/(69-92) 145/88  SpO2:  [90 %-95 %] 93 %    Physical Exam  Vitals and nursing note reviewed.   Constitutional:       Appearance: Normal appearance.   HENT:      Head: Normocephalic and atraumatic.      Nose: Nose normal.      Mouth/Throat:      Mouth: Mucous membranes are dry.   Eyes:      General: No scleral icterus.        Right eye: No discharge.         Left eye: No discharge.   Cardiovascular:      Rate and Rhythm: Normal rate and regular rhythm.      Pulses: Normal pulses.      Heart sounds: Normal heart sounds.   Pulmonary:      Effort: Pulmonary effort is normal. No respiratory distress.      Breath sounds: Rales present.   Abdominal:      General: Bowel sounds are normal. There is no distension.      Palpations: Abdomen is soft.      Tenderness: There is no abdominal tenderness. There is no guarding.   Musculoskeletal:         General: Normal range of motion.      Cervical back: Normal range of motion and neck supple.      Right lower leg: No edema.   Skin:     General: Skin is warm and dry.      Capillary Refill: Capillary refill takes less than 2 seconds.      Coloration: Skin is not jaundiced.   Neurological:      General: No focal deficit present.      Mental Status: She is alert and oriented to person, place, and time.      Cranial Nerves: No cranial nerve deficit.   Psychiatric:         Mood and Affect: Mood normal.         Fluids    Intake/Output Summary (Last 24 hours) at 8/30/2021 1416  Last data filed at 8/30/2021 0200  Gross per 24 hour   Intake 200 ml   Output 400 ml   Net -200 ml       Laboratory  Recent Labs     08/29/21  1131 08/30/21  0305   WBC 6.8 2.7*   RBC 4.70 4.59   HEMOGLOBIN 16.8* 16.4*   HEMATOCRIT 48.8*  48.0*   .8* 104.6*   MCH 35.7* 35.7*   MCHC 34.4 34.2   RDW 48.7 49.3   PLATELETCT 146* 146*   MPV 11.1 11.4     Recent Labs     08/29/21  1131 08/30/21  0305   SODIUM 136 139   POTASSIUM 3.3* 3.5*   CHLORIDE 99 102   CO2 24 23   GLUCOSE 88 127*   BUN 21 21   CREATININE 0.67 0.52   CALCIUM 8.9 9.7     Recent Labs     08/29/21  1420   APTT 34.5   INR 1.07               Imaging  DX-CHEST-PORTABLE (1 VIEW)   Final Result      Ill-defined right basilar opacities may represent atelectasis or consolidation. Pneumonia can be considered in the appropriate clinical settings. Trace right pleural effusion.      EC-ECHOCARDIOGRAM COMPLETE W/O CONT   Final Result           Assessment/Plan  Elevated troponin- (present on admission)  Assessment & Plan  No chest pain, cardio Dr To consulted by ERP  Echo showed EF 45% with  Abnormal septal motion consistent with left bundle branch block, will need ischemic workup when recover from covid.   Low dose lisinopril  Hold bb's for now due to mild bradycardia.     Leukopenia  Assessment & Plan  Probably due to covid, continue close monitoring.     Macrocytosis- (present on admission)  Assessment & Plan  Check vit b12.     Thrombocytopenia (HCC)  Assessment & Plan    monitor    Methadone dependence (HCC)  Assessment & Plan  Restart at 30 mg today,discussed with pharmacist.     Hypokalemia  Assessment & Plan  replacing    LBBB (left bundle branch block)  Assessment & Plan  As identified on EKG  Cardio following   Monitor.   No chest pain     Acute hypoxemic respiratory failure due to COVID-19 (HCC)  Assessment & Plan  COVID positive  Continue decadron  ID consulted for remdesivir.   On 6L of o2         VTE prophylaxis: enoxaparin ppx and heparin ppx    I have performed a physical exam and reviewed and updated ROS and Plan today (8/30/2021). In review of yesterday's note (8/29/2021), there are no changes except as documented above.    Case and plan of care discussed with patient,  nurse staff and during multidisciplinary rounds

## 2021-08-30 NOTE — PROGRESS NOTES
Assumed care of patient at bedside report from ER nurse, Stefany. Updated on POC. Call light within reach. Whiteboard updated. Fall precautions in place. Bed locked and in lowest position. All questions answered. No other needs indicated at this time.

## 2021-08-30 NOTE — PROGRESS NOTES
4 Eyes Skin Assessment Completed by LYNDSEY gar and LYNDSEY lynn.    Head WDL  Ears Redness and Blanching  Nose WDL  Mouth WDL  Neck WDL  Breast/Chest WDL  Shoulder Blades WDL  Spine WDL  (R) Arm/Elbow/Hand Redness and Blanching on elbow   (L) Arm/Elbow/Hand Redness and Blanching elbow   Abdomen WDL  Groin WDL  Scrotum/Coccyx/Buttocks Redness and Blanching  (R) Leg WDL  (L) Leg WDL  (R) Heel/Foot/Toe Redness and Blanching  (L) Heel/Foot/Toe Redness and Blanching          Devices In Places Tele Box, Pulse Ox and Nasal Cannula      Interventions In Place Gray Ear Foams, Pillows and Pressure Redistribution Mattress    Possible Skin Injury No    Pictures Uploaded Into Epic N/A  Wound Consult Placed N/A  RN Wound Prevention Protocol Ordered No

## 2021-08-30 NOTE — ED NOTES
"This RN spoke with family about experience with Primary ED RN. Family reports, \"she gave me a number to call but it just rang and hung up.\"  Appears family was provided with RN's Voal[employer] number. This RN apologized for the inconvenience and explained the limitations of the Voalte phone.  Family continues to report, \"I then called the main number,the  was great. But, when I talked to the nurse taking care of her, she wouldn't give me any information. I said, is she on a ventilator is she ok.  I gave her my mom's name and birthday and she still wouldn't tell me anything. All she said was my mom was doing stable and breathing on her own.\" This RN explained that she had called from a new number and not one she had previously provided.  The author continued to explain, because the Primary ED RN did not recognize the number she was not comfortable providing patient information over the phone.   Family was informed that due to the patient's diagnosis, extra precautions were in place to protect patients and other familles. Furthermore, the family was provided with handouts detailing [facility]'s Visitor Policy and In-Patient Mask/PPE Requirements.    "

## 2021-08-30 NOTE — ASSESSMENT & PLAN NOTE
No chest pain, cardio Dr To consulted by ERP  Echo showed EF 45% with  Abnormal septal motion consistent with left bundle branch block, will need ischemic workup when recover from covid.   Low dose lisinopril  Hold bb's for now due to mild bradycardia.

## 2021-08-30 NOTE — PROGRESS NOTES
Brief ID note:    -Remdesivir approval requested by Dr. Torrez  -Chart reviewed.  Patient admitted with COVID-19 pneumonia with progressive oxygen requirements, currently on 8L  -Patient is already on dexamethasone, prophylactic Lovenox  -Remdesivir approved. Will initiate a 5-day course of IV remdesivir  -Monitor daily CMP. Discontinue remdesivir if ALT >500 or if creatinine clearance <30  -Okay to discontinue remdesivir if patient is cleared for discharge  -Continue management per hospitalist teams. Please call us back if formal consult requested

## 2021-08-30 NOTE — ED NOTES
"RN attempted to update family on POC in the ED which included a covid/septic workup per protocol. Family became upset stating \"why are you guys testing for covid, we know it's not that\". RN educated that it is part of the protocol due to her symptoms. Family also seen without masks on in lobby eating/drinking multiple times. Family then stated \"we aren't getting vaccinated if that's what you are asking\".   "

## 2021-08-30 NOTE — HOSPITAL COURSE
61-year-old female with history of COPD, rheumatic arthritis and chronic methadone who presented 8/29/2021 with shortness of breath.  Patient had worsening shortness of breath for last couple days before the admission with a dry coughing and chills, she has some nausea with no urinary symptoms and no diarrhea, she has significant generalized weakness, she was brought in by EMS for worsening shortness of breath, chest x-ray showed bilateral pneumonia, COVID-19 was positive, patient needed 8 L nasal cannula, dexamethasone was a started and remdesivir was initiated by ID.  Lower extremity edema did not show DVT.      EKG demonstrated left bundle branch block which is old. Echo demonstrated regional wall motion abnormality with EF of 45%, with septal abnormal motion, CXR demonstrated right sided opacity.  Troponin trending down, cardiologist did not think as a STEMI, patient is to follow-up with cardiology clinic for more work-up after treating Covid infection, to continue aspirin and atorvastatin.

## 2021-08-30 NOTE — CARE PLAN
Problem: Knowledge Deficit - Standard  Goal: Patient and family/care givers will demonstrate understanding of plan of care, disease process/condition, diagnostic tests and medications  Outcome: Progressing   The patient is Watcher - Medium risk of patient condition declining or worsening         Progress made toward(s) clinical / shift goals:      Patient is not progressing towards the following goals:

## 2021-08-30 NOTE — ED NOTES
Report given at bedside to Tele 8 LYNDSEY Veloz. Patient transported to floor via gurney with oxygen and cardiac monitor in place accompanied by Tele 8 RN x 2. All belongings accounted for.

## 2021-08-31 ENCOUNTER — HOME HEALTH ADMISSION (OUTPATIENT)
Dept: HOME HEALTH SERVICES | Facility: HOME HEALTHCARE | Age: 61
End: 2021-08-31
Payer: MEDICARE

## 2021-08-31 LAB
ALBUMIN SERPL BCP-MCNC: 3 G/DL (ref 3.2–4.9)
ALBUMIN/GLOB SERPL: 0.8 G/DL
ALP SERPL-CCNC: 82 U/L (ref 30–99)
ALT SERPL-CCNC: 19 U/L (ref 2–50)
ANION GAP SERPL CALC-SCNC: 13 MMOL/L (ref 7–16)
AST SERPL-CCNC: 36 U/L (ref 12–45)
BASOPHILS # BLD AUTO: 0.4 % (ref 0–1.8)
BASOPHILS # BLD: 0.03 K/UL (ref 0–0.12)
BILIRUB SERPL-MCNC: 0.4 MG/DL (ref 0.1–1.5)
BUN SERPL-MCNC: 25 MG/DL (ref 8–22)
CALCIUM SERPL-MCNC: 9.4 MG/DL (ref 8.5–10.5)
CHLORIDE SERPL-SCNC: 103 MMOL/L (ref 96–112)
CO2 SERPL-SCNC: 22 MMOL/L (ref 20–33)
CREAT SERPL-MCNC: 0.41 MG/DL (ref 0.5–1.4)
EOSINOPHIL # BLD AUTO: 0 K/UL (ref 0–0.51)
EOSINOPHIL NFR BLD: 0 % (ref 0–6.9)
ERYTHROCYTE [DISTWIDTH] IN BLOOD BY AUTOMATED COUNT: 49.4 FL (ref 35.9–50)
GLOBULIN SER CALC-MCNC: 3.9 G/DL (ref 1.9–3.5)
GLUCOSE SERPL-MCNC: 134 MG/DL (ref 65–99)
HCT VFR BLD AUTO: 46.7 % (ref 37–47)
HGB BLD-MCNC: 16 G/DL (ref 12–16)
IMM GRANULOCYTES # BLD AUTO: 0.1 K/UL (ref 0–0.11)
IMM GRANULOCYTES NFR BLD AUTO: 1.3 % (ref 0–0.9)
LYMPHOCYTES # BLD AUTO: 0.69 K/UL (ref 1–4.8)
LYMPHOCYTES NFR BLD: 8.6 % (ref 22–41)
MCH RBC QN AUTO: 36 PG (ref 27–33)
MCHC RBC AUTO-ENTMCNC: 34.3 G/DL (ref 33.6–35)
MCV RBC AUTO: 104.9 FL (ref 81.4–97.8)
MONOCYTES # BLD AUTO: 0.64 K/UL (ref 0–0.85)
MONOCYTES NFR BLD AUTO: 8 % (ref 0–13.4)
NEUTROPHILS # BLD AUTO: 6.54 K/UL (ref 2–7.15)
NEUTROPHILS NFR BLD: 81.7 % (ref 44–72)
NRBC # BLD AUTO: 0 K/UL
NRBC BLD-RTO: 0 /100 WBC
PLATELET # BLD AUTO: 180 K/UL (ref 164–446)
PMV BLD AUTO: 11 FL (ref 9–12.9)
POTASSIUM SERPL-SCNC: 3.4 MMOL/L (ref 3.6–5.5)
PROCALCITONIN SERPL-MCNC: 0.11 NG/ML
PROT SERPL-MCNC: 6.9 G/DL (ref 6–8.2)
RBC # BLD AUTO: 4.45 M/UL (ref 4.2–5.4)
SODIUM SERPL-SCNC: 138 MMOL/L (ref 135–145)
WBC # BLD AUTO: 8 K/UL (ref 4.8–10.8)

## 2021-08-31 PROCEDURE — A9270 NON-COVERED ITEM OR SERVICE: HCPCS | Performed by: INTERNAL MEDICINE

## 2021-08-31 PROCEDURE — 700101 HCHG RX REV CODE 250: Performed by: INTERNAL MEDICINE

## 2021-08-31 PROCEDURE — 85025 COMPLETE CBC W/AUTO DIFF WBC: CPT

## 2021-08-31 PROCEDURE — 770020 HCHG ROOM/CARE - TELE (206)

## 2021-08-31 PROCEDURE — 700105 HCHG RX REV CODE 258: Performed by: INTERNAL MEDICINE

## 2021-08-31 PROCEDURE — 700111 HCHG RX REV CODE 636 W/ 250 OVERRIDE (IP): Performed by: INTERNAL MEDICINE

## 2021-08-31 PROCEDURE — 99232 SBSQ HOSP IP/OBS MODERATE 35: CPT | Performed by: INTERNAL MEDICINE

## 2021-08-31 PROCEDURE — 84145 PROCALCITONIN (PCT): CPT

## 2021-08-31 PROCEDURE — 36415 COLL VENOUS BLD VENIPUNCTURE: CPT

## 2021-08-31 PROCEDURE — 80053 COMPREHEN METABOLIC PANEL: CPT

## 2021-08-31 PROCEDURE — 700102 HCHG RX REV CODE 250 W/ 637 OVERRIDE(OP): Performed by: INTERNAL MEDICINE

## 2021-08-31 PROCEDURE — A9270 NON-COVERED ITEM OR SERVICE: HCPCS | Performed by: HOSPITALIST

## 2021-08-31 PROCEDURE — 700102 HCHG RX REV CODE 250 W/ 637 OVERRIDE(OP): Performed by: HOSPITALIST

## 2021-08-31 RX ORDER — METHADONE HYDROCHLORIDE 10 MG/ML
56 CONCENTRATE ORAL ONCE
Status: COMPLETED | OUTPATIENT
Start: 2021-08-31 | End: 2021-08-31

## 2021-08-31 RX ORDER — HYDROXYZINE HYDROCHLORIDE 10 MG/1
10 TABLET, FILM COATED ORAL 3 TIMES DAILY PRN
Status: DISPENSED | OUTPATIENT
Start: 2021-08-31 | End: 2021-09-03

## 2021-08-31 RX ORDER — ATORVASTATIN CALCIUM 40 MG/1
40 TABLET, FILM COATED ORAL EVERY EVENING
Status: DISCONTINUED | OUTPATIENT
Start: 2021-08-31 | End: 2021-09-04 | Stop reason: HOSPADM

## 2021-08-31 RX ORDER — METHADONE HYDROCHLORIDE 10 MG/ML
86 CONCENTRATE ORAL DAILY
Status: DISCONTINUED | OUTPATIENT
Start: 2021-09-01 | End: 2021-09-04 | Stop reason: HOSPADM

## 2021-08-31 RX ADMIN — LISINOPRIL 2.5 MG: 5 TABLET ORAL at 04:52

## 2021-08-31 RX ADMIN — ATORVASTATIN CALCIUM 40 MG: 40 TABLET, FILM COATED ORAL at 20:32

## 2021-08-31 RX ADMIN — BENZONATATE 100 MG: 100 CAPSULE ORAL at 13:02

## 2021-08-31 RX ADMIN — HYDROXYZINE HYDROCHLORIDE 10 MG: 10 TABLET, FILM COATED ORAL at 21:26

## 2021-08-31 RX ADMIN — METHADONE HYDROCHLORIDE 30 MG: 10 TABLET ORAL at 04:53

## 2021-08-31 RX ADMIN — HYDROXYZINE HYDROCHLORIDE 10 MG: 10 TABLET, FILM COATED ORAL at 09:29

## 2021-08-31 RX ADMIN — METHADONE HYDROCHLORIDE 56 MG: 10 CONCENTRATE ORAL at 13:02

## 2021-08-31 RX ADMIN — DEXAMETHASONE 6 MG: 4 TABLET ORAL at 04:52

## 2021-08-31 RX ADMIN — BENZONATATE 100 MG: 100 CAPSULE ORAL at 17:02

## 2021-08-31 RX ADMIN — BENZONATATE 100 MG: 100 CAPSULE ORAL at 04:52

## 2021-08-31 RX ADMIN — ENOXAPARIN SODIUM 40 MG: 40 INJECTION SUBCUTANEOUS at 04:53

## 2021-08-31 RX ADMIN — GUAIFENESIN 600 MG: 600 TABLET, EXTENDED RELEASE ORAL at 17:02

## 2021-08-31 RX ADMIN — HYDROXYZINE HYDROCHLORIDE 10 MG: 10 TABLET, FILM COATED ORAL at 17:02

## 2021-08-31 RX ADMIN — GUAIFENESIN 600 MG: 600 TABLET, EXTENDED RELEASE ORAL at 04:52

## 2021-08-31 RX ADMIN — REMDESIVIR 100 MG: 100 INJECTION, POWDER, LYOPHILIZED, FOR SOLUTION INTRAVENOUS at 17:02

## 2021-08-31 ASSESSMENT — ENCOUNTER SYMPTOMS
HEMOPTYSIS: 0
BRUISES/BLEEDS EASILY: 0
NAUSEA: 0
COUGH: 1
CHILLS: 0
MYALGIAS: 0
DOUBLE VISION: 0
PALPITATIONS: 0
WHEEZING: 0
DIZZINESS: 0
FEVER: 0
VOMITING: 0
HEARTBURN: 0
BACK PAIN: 0
SHORTNESS OF BREATH: 1
BLURRED VISION: 0
DEPRESSION: 0
HEADACHES: 0
CLAUDICATION: 0

## 2021-08-31 ASSESSMENT — PAIN DESCRIPTION - PAIN TYPE: TYPE: ACUTE PAIN

## 2021-08-31 NOTE — RESPIRATORY CARE
REMOTE MONITORING PROGRAM by RESPIRATORY THERAPY  8/31/2021 at 10:17 AM by Cal Burnette     Patient interviewed by RT. Patient is not eligible for program because she does not have a smart phone.

## 2021-08-31 NOTE — CARE PLAN
Problem: Communication  Goal: The ability to communicate needs accurately and effectively will improve  Outcome: Progressing     Problem: Discharge Barriers/Planning  Goal: Patient's continuum of care needs are met  Outcome: Progressing     Problem: Hemodynamics  Goal: Patient's hemodynamics, fluid balance and neurologic status will be stable or improve  Outcome: Progressing     Problem: Respiratory  Goal: Patient will achieve/maintain optimum respiratory ventilation and gas exchange  Outcome: Progressing   The patient is Stable - Low risk of patient condition declining or worsening    Shift Goals  Clinical Goals: Titrate O2  Patient Goals: Rest    Progress made toward(s) clinical / shift goals:  Patient titrated down to 4L NC. Patient resting comfortably in bed.     Patient is not progressing towards the following goals:

## 2021-08-31 NOTE — PROGRESS NOTES
Assumed care at 1900, bedside report received from Octavia SOLORIO. Pt. Is SR on the monitor. Initial assessment completed, orders reviewed, call light within reach, bed alarm not in use, and hourly rounding in place. POC addressed with patient, no additional questions at this time.

## 2021-08-31 NOTE — FACE TO FACE
"Face to Face Note  -  Durable Medical Equipment    Rashaad Araujo M.D. - NPI: [id number]  I certify that this patient is under my care and that they had a durable medical equipment(DME)face to face encounter by myself that meets the physician DME face-to-face encounter requirements with this patient on:    Date of encounter:   Patient:                    MRN:                       YOB: 2022  Sanam Andre  [id number]  1960     The encounter with the patient was in whole, or in part, for the following medical condition, which is the primary reason for durable medical equipment:  Covid-19 Infection    I certify that, based on my findings, the following durable medical equipment is medically necessary:  Oxygen.    HOME O2 Saturation Measurements:(Values must be present for Home Oxygen orders)  Room air sat at rest: 90  Room air sat with amb: 80  With liters of O2: 6, O2 sat at rest with O2: 93  With Liters of O2: 6, O2 sat with amb with O2 : 94  Is the patient mobile?: Yes (weakness)    My Clinical findings support the need for the above equipment due to:  Hypoxia    Supporting Symptoms: The patient requires supplemental oxygen, as the following interventions have been tried with limited or no improvement: \"Bronchodilators and/or steroid inhalers, \"Positive expiratory pressure therapies and \"Oral and/or IV steroids    If patient feels more short of breath, they can go up to 6 liters per minute and contact healthcare provider.  "

## 2021-08-31 NOTE — FACE TO FACE
Face to Face Supporting Documentation - Home Health    The encounter with this patient was in whole or in part the primary reason for home health admission.    Date of encounter:   Patient:                    MRN:                       YOB: 2022  Sanam Andre  [id number]  1960     Home health to see patient for:  Skilled Nursing care for assessment, interventions & education, Physical Therapy evaluation and treatment and Occupational therapy evaluation and treatment    Skilled need for:  Exacerbation of Chronic Disease State Covid     Skilled nursing interventions to include:  Line/Drain/Airway education and care    Homebound status evidenced by:  Need the aid of supportive devices such as crutches, canes, wheelchairs or walkers. Leaving home requires a considerable and taxing effort. There is a normal inability to leave the home.    Community Physician to provide follow up care: RUDDY Avitia     Optional Interventions? No      I certify the face to face encounter for this home health care referral meets the CMS requirements and the encounter/clinical assessment with the patient was, in whole, or in part, for the medical condition(s) listed above, which is the primary reason for home health care. Based on my clinical findings: the service(s) are medically necessary, support the need for home health care, and the homebound criteria are met.  I certify that this patient has had a face to face encounter by myself.  Rashaad Araujo M.D. - BESSI: [id number]

## 2021-08-31 NOTE — DISCHARGE PLANNING
Received Choice form at 1040  Agency/Facility Name: [facility]  Referral sent per Choice form @ 1125     1352-  Agency/Facility Name: [facility]  Spoke To: Ellie  Outcome: DPA confirmed updated [place] address with [facility]. [facility] to Pt bedside.     1620-  Received Choice form at 1616  Agency/Facility Name: [facility]  Referral sent per Choice form @ 1620

## 2021-08-31 NOTE — PROGRESS NOTES
Received report from day shift RN, assumed pt care. A&O x 3. No report of pain. Fall precautions in place. Call light and bedside table within reach. Assessment completed. Will continue to monitor.

## 2021-08-31 NOTE — FACE TO FACE
"Face to Face Note  -  Durable Medical Equipment    Jerrod Torrez M.D. - NPI: [id number]  I certify that this patient is under my care and that they had a durable medical equipment(DME)face to face encounter by myself that meets the physician DME face-to-face encounter requirements with this patient on:    Date of encounter:   Patient:                    MRN:                       YOB: 2022  Sanam Andre  [id number]  1960     The encounter with the patient was in whole, or in part, for the following medical condition, which is the primary reason for durable medical equipment:  Covid-19 Infection    I certify that, based on my findings, the following durable medical equipment is medically necessary:  Oxygen.    HOME O2 Saturation Measurements:(Values must be present for Home Oxygen orders)  Room air sat at rest: 90 (sitting)     With liters of O2: 0, O2 sat at rest with O2: 90 (sitting)   , O2 sat with amb with O2 : 80 (walikng)       My Clinical findings support the need for the above equipment due to:  Hypoxia    Supporting Symptoms: The patient requires supplemental oxygen, as the following interventions have been tried with limited or no improvement: \"Positive expiratory pressure therapies    If patient feels more short of breath, they can go up to 6 liters per minute and contact healthcare provider.  "

## 2021-08-31 NOTE — CARE PLAN
Problem: Knowledge Deficit - Standard  Goal: Patient and family/care givers will demonstrate understanding of plan of care, disease process/condition, diagnostic tests and medications  Outcome: Progressing     Problem: Communication  Goal: The ability to communicate needs accurately and effectively will improve  Outcome: Progressing     Problem: Discharge Barriers/Planning  Goal: Patient's continuum of care needs are met  Outcome: Progressing     Problem: Respiratory  Goal: Patient will achieve/maintain optimum respiratory ventilation and gas exchange  Outcome: Progressing   The patient is Watcher - Medium risk of patient condition declining or worsening    Shift Goals  Clinical Goals: Titrate O2  Patient Goals: Rest    Progress made toward(s) clinical / shift goals:      Patient is not progressing towards the following goals: patient oxygen back up to 5.5 L

## 2021-08-31 NOTE — DISCHARGE PLANNING
Anticipated Discharge Disposition: Home with O2    Action: LSW spoke to patient via room phone. Patient consents to have referral for O2 sent to [facility]. Patient reports that her address on facesheet is incorrect. Patient lives with daughter Lena.    LSW spoke to daughter Lena, via cell phone who reports that address is [address] [place] [address]. Facesheet updated.     Barriers to Discharge: O2 acceptance and delivery    Plan: LSW to f/u on referral    1400: LSW attempted to speak to patient about HH orders. Patient's phone busy. LSW sent voalte message to RN who will check patient's phone.    1430: LSW received message from bedside RN who reports that patient's phone was off the hook and now is hung up. LSW spoke to patient via phone. Patient agreeable to HH and has no preference on HH company as long as its contracted with insurance. Referral sent to [facility].

## 2021-08-31 NOTE — PROGRESS NOTES
Med Rec complete per pt and [facility]  Allergies Reviewed  No ABX in the last 14 days    Last dose of Methadone given on 8/26/21 - 86 mg

## 2021-08-31 NOTE — PROGRESS NOTES
Micro notified this RN of positive blood cultures. MD notified. Per MD it is suspicious of contamination. No new orders at this time.

## 2021-08-31 NOTE — DISCHARGE PLANNING
Good afternoon,    This referral has been escalated to a Clinical Supervisor for review in order to determine Home Health appropriateness. We also need to verify patient PCP signer status  This issue will be resolved as quickly as possible, but for any questions feel free to call us at [phone].  Thank you!

## 2021-09-01 ENCOUNTER — APPOINTMENT (OUTPATIENT)
Dept: RADIOLOGY | Facility: MEDICAL CENTER | Age: 61
DRG: 177 | End: 2021-09-01
Attending: INTERNAL MEDICINE
Payer: MEDICARE

## 2021-09-01 LAB
ALBUMIN SERPL BCP-MCNC: 3 G/DL (ref 3.2–4.9)
ALBUMIN/GLOB SERPL: 0.7 G/DL
ALP SERPL-CCNC: 81 U/L (ref 30–99)
ALT SERPL-CCNC: 16 U/L (ref 2–50)
ANION GAP SERPL CALC-SCNC: 12 MMOL/L (ref 7–16)
AST SERPL-CCNC: 21 U/L (ref 12–45)
BACTERIA BLD CULT: ABNORMAL
BACTERIA BLD CULT: ABNORMAL
BASE EXCESS BLDA CALC-SCNC: -1 MMOL/L (ref -4–3)
BASOPHILS # BLD AUTO: 0.2 % (ref 0–1.8)
BASOPHILS # BLD: 0.02 K/UL (ref 0–0.12)
BILIRUB SERPL-MCNC: 0.6 MG/DL (ref 0.1–1.5)
BODY TEMPERATURE: ABNORMAL CENTIGRADE
BUN SERPL-MCNC: 20 MG/DL (ref 8–22)
CALCIUM SERPL-MCNC: 9.4 MG/DL (ref 8.5–10.5)
CHLORIDE SERPL-SCNC: 105 MMOL/L (ref 96–112)
CO2 SERPL-SCNC: 22 MMOL/L (ref 20–33)
CREAT SERPL-MCNC: 0.54 MG/DL (ref 0.5–1.4)
EOSINOPHIL # BLD AUTO: 0 K/UL (ref 0–0.51)
EOSINOPHIL NFR BLD: 0 % (ref 0–6.9)
ERYTHROCYTE [DISTWIDTH] IN BLOOD BY AUTOMATED COUNT: 49.6 FL (ref 35.9–50)
GLOBULIN SER CALC-MCNC: 4.1 G/DL (ref 1.9–3.5)
GLUCOSE SERPL-MCNC: 89 MG/DL (ref 65–99)
HCO3 BLDA-SCNC: 23 MMOL/L (ref 17–25)
HCT VFR BLD AUTO: 50.5 % (ref 37–47)
HGB BLD-MCNC: 17.3 G/DL (ref 12–16)
IMM GRANULOCYTES # BLD AUTO: 0.06 K/UL (ref 0–0.11)
IMM GRANULOCYTES NFR BLD AUTO: 0.7 % (ref 0–0.9)
LYMPHOCYTES # BLD AUTO: 0.86 K/UL (ref 1–4.8)
LYMPHOCYTES NFR BLD: 9.8 % (ref 22–41)
MAGNESIUM SERPL-MCNC: 1.7 MG/DL (ref 1.5–2.5)
MCH RBC QN AUTO: 35.7 PG (ref 27–33)
MCHC RBC AUTO-ENTMCNC: 34.3 G/DL (ref 33.6–35)
MCV RBC AUTO: 104.1 FL (ref 81.4–97.8)
MONOCYTES # BLD AUTO: 0.6 K/UL (ref 0–0.85)
MONOCYTES NFR BLD AUTO: 6.8 % (ref 0–13.4)
NEUTROPHILS # BLD AUTO: 7.27 K/UL (ref 2–7.15)
NEUTROPHILS NFR BLD: 82.5 % (ref 44–72)
NRBC # BLD AUTO: 0 K/UL
NRBC BLD-RTO: 0 /100 WBC
PCO2 BLDA: 35.9 MMHG (ref 26–37)
PH BLDA: 7.42 [PH] (ref 7.4–7.5)
PLATELET # BLD AUTO: 151 K/UL (ref 164–446)
PMV BLD AUTO: 11.3 FL (ref 9–12.9)
PO2 BLDA: 64.7 MMHG (ref 64–87)
POTASSIUM SERPL-SCNC: 3 MMOL/L (ref 3.6–5.5)
PROT SERPL-MCNC: 7.1 G/DL (ref 6–8.2)
RBC # BLD AUTO: 4.85 M/UL (ref 4.2–5.4)
SAO2 % BLDA: 91.4 % (ref 93–99)
SIGNIFICANT IND 70042: ABNORMAL
SITE SITE: ABNORMAL
SODIUM SERPL-SCNC: 139 MMOL/L (ref 135–145)
SOURCE SOURCE: ABNORMAL
WBC # BLD AUTO: 8.8 K/UL (ref 4.8–10.8)

## 2021-09-01 PROCEDURE — 700102 HCHG RX REV CODE 250 W/ 637 OVERRIDE(OP): Performed by: INTERNAL MEDICINE

## 2021-09-01 PROCEDURE — 770020 HCHG ROOM/CARE - TELE (206)

## 2021-09-01 PROCEDURE — 82803 BLOOD GASES ANY COMBINATION: CPT

## 2021-09-01 PROCEDURE — 71045 X-RAY EXAM CHEST 1 VIEW: CPT

## 2021-09-01 PROCEDURE — 700105 HCHG RX REV CODE 258: Performed by: INTERNAL MEDICINE

## 2021-09-01 PROCEDURE — 700102 HCHG RX REV CODE 250 W/ 637 OVERRIDE(OP): Performed by: HOSPITALIST

## 2021-09-01 PROCEDURE — A9270 NON-COVERED ITEM OR SERVICE: HCPCS | Performed by: INTERNAL MEDICINE

## 2021-09-01 PROCEDURE — 99233 SBSQ HOSP IP/OBS HIGH 50: CPT | Performed by: INTERNAL MEDICINE

## 2021-09-01 PROCEDURE — 80053 COMPREHEN METABOLIC PANEL: CPT

## 2021-09-01 PROCEDURE — A9270 NON-COVERED ITEM OR SERVICE: HCPCS | Performed by: HOSPITALIST

## 2021-09-01 PROCEDURE — 83735 ASSAY OF MAGNESIUM: CPT

## 2021-09-01 PROCEDURE — 700111 HCHG RX REV CODE 636 W/ 250 OVERRIDE (IP): Performed by: INTERNAL MEDICINE

## 2021-09-01 PROCEDURE — 36415 COLL VENOUS BLD VENIPUNCTURE: CPT

## 2021-09-01 PROCEDURE — 87040 BLOOD CULTURE FOR BACTERIA: CPT

## 2021-09-01 PROCEDURE — 700101 HCHG RX REV CODE 250: Performed by: INTERNAL MEDICINE

## 2021-09-01 PROCEDURE — 85025 COMPLETE CBC W/AUTO DIFF WBC: CPT

## 2021-09-01 RX ORDER — FUROSEMIDE 10 MG/ML
40 INJECTION INTRAMUSCULAR; INTRAVENOUS
Status: DISCONTINUED | OUTPATIENT
Start: 2021-09-01 | End: 2021-09-04 | Stop reason: HOSPADM

## 2021-09-01 RX ORDER — POTASSIUM CHLORIDE 20 MEQ/1
40 TABLET, EXTENDED RELEASE ORAL 2 TIMES DAILY
Status: DISCONTINUED | OUTPATIENT
Start: 2021-09-01 | End: 2021-09-02

## 2021-09-01 RX ORDER — MAGNESIUM SULFATE HEPTAHYDRATE 40 MG/ML
4 INJECTION, SOLUTION INTRAVENOUS ONCE
Status: COMPLETED | OUTPATIENT
Start: 2021-09-01 | End: 2021-09-01

## 2021-09-01 RX ADMIN — GUAIFENESIN 600 MG: 600 TABLET, EXTENDED RELEASE ORAL at 05:36

## 2021-09-01 RX ADMIN — HYDROXYZINE HYDROCHLORIDE 10 MG: 10 TABLET, FILM COATED ORAL at 23:27

## 2021-09-01 RX ADMIN — REMDESIVIR 100 MG: 100 INJECTION, POWDER, LYOPHILIZED, FOR SOLUTION INTRAVENOUS at 22:00

## 2021-09-01 RX ADMIN — BENZONATATE 100 MG: 100 CAPSULE ORAL at 12:14

## 2021-09-01 RX ADMIN — ASPIRIN 81 MG: 81 TABLET, COATED ORAL at 05:36

## 2021-09-01 RX ADMIN — FUROSEMIDE 40 MG: 10 INJECTION, SOLUTION INTRAMUSCULAR; INTRAVENOUS at 15:43

## 2021-09-01 RX ADMIN — METHADONE HYDROCHLORIDE 86 MG: 10 CONCENTRATE ORAL at 05:54

## 2021-09-01 RX ADMIN — ATORVASTATIN CALCIUM 40 MG: 40 TABLET, FILM COATED ORAL at 16:45

## 2021-09-01 RX ADMIN — GUAIFENESIN 600 MG: 600 TABLET, EXTENDED RELEASE ORAL at 16:45

## 2021-09-01 RX ADMIN — BENZONATATE 100 MG: 100 CAPSULE ORAL at 05:36

## 2021-09-01 RX ADMIN — POTASSIUM CHLORIDE 40 MEQ: 1500 TABLET, EXTENDED RELEASE ORAL at 16:45

## 2021-09-01 RX ADMIN — BENZONATATE 100 MG: 100 CAPSULE ORAL at 16:45

## 2021-09-01 RX ADMIN — LISINOPRIL 2.5 MG: 5 TABLET ORAL at 05:36

## 2021-09-01 RX ADMIN — DEXAMETHASONE 6 MG: 4 TABLET ORAL at 05:35

## 2021-09-01 RX ADMIN — MAGNESIUM SULFATE IN WATER 4 G: 40 INJECTION, SOLUTION INTRAVENOUS at 15:43

## 2021-09-01 RX ADMIN — POTASSIUM CHLORIDE 40 MEQ: 1500 TABLET, EXTENDED RELEASE ORAL at 10:39

## 2021-09-01 RX ADMIN — ENOXAPARIN SODIUM 40 MG: 40 INJECTION SUBCUTANEOUS at 05:36

## 2021-09-01 ASSESSMENT — ENCOUNTER SYMPTOMS
DEPRESSION: 0
HEMOPTYSIS: 0
BLURRED VISION: 0
DOUBLE VISION: 0
DIZZINESS: 0
CLAUDICATION: 0
NAUSEA: 0
HEADACHES: 0
CHILLS: 0
BRUISES/BLEEDS EASILY: 0
PALPITATIONS: 0
MYALGIAS: 0
VOMITING: 0
BACK PAIN: 0
FEVER: 0
WHEEZING: 0
HEARTBURN: 0
COUGH: 1
SHORTNESS OF BREATH: 1

## 2021-09-01 ASSESSMENT — FIBROSIS 4 INDEX: FIB4 SCORE: 2.16

## 2021-09-01 ASSESSMENT — PAIN DESCRIPTION - PAIN TYPE: TYPE: ACUTE PAIN

## 2021-09-01 NOTE — DISCHARGE PLANNING
Anticipated Discharge Disposition: home with dme oxygen and home health     Action: per IDDR rounds, Tia with [facility] attempted to deliver pt's equipment and provide education on using oxygen equipment, but the pt appeared to be confused and Tia was not able to provide the equipment nor education.     Per IDDR discussions, pt is requiring 6L oxygen and saturating at 88%.     She is also needing a PCP visit to reestablish with her PCP for home health to see her.     Barriers to Discharge: oxygen levels, mental status, pcp for home health     Plan: LSW to assist as needed and monitor for barriers to discharge.

## 2021-09-01 NOTE — CARE PLAN
Problem: Communication  Goal: The ability to communicate needs accurately and effectively will improve  Outcome: Progressing     Problem: Hemodynamics  Goal: Patient's hemodynamics, fluid balance and neurologic status will be stable or improve  Outcome: Progressing     Problem: Respiratory  Goal: Patient will achieve/maintain optimum respiratory ventilation and gas exchange  Outcome: Progressing   The patient is Stable - Low risk of patient condition declining or worsening    Shift Goals  Clinical Goals: Titrate O2  Patient Goals: Rest    Progress made toward(s) clinical / shift goals:  Patient is on 6L [place]. Hemodynamically stable. Patient is resting comfortably in bed.     Patient is not progressing towards the following goals:

## 2021-09-01 NOTE — CARE PLAN
Problem: Respiratory  Goal: Patient will achieve/maintain optimum respiratory ventilation and gas exchange  Outcome: Not Progressing  Flowsheets (Taken 9/1/2021 1042)  O2 Delivery Device: Silicone Nasal Cannula  Incentive Spirometer:   Ineffective, Needs Coaching   Weak Effort  Deep Breathe and Cough: Needs Coaching     Problem: Knowledge Deficit - Standard  Goal: Patient and family/care givers will demonstrate understanding of plan of care, disease process/condition, diagnostic tests and medications  Outcome: Progressing     Problem: Psychosocial  Goal: Patient's level of anxiety will decrease  Outcome: Progressing   The patient is Watcher - Medium risk of patient condition declining or worsening    Shift Goals  Clinical Goals: wean O2 down  Patient Goals: go home, rest    Progress made toward(s) clinical / shift goals:  maintain adequate oxygenation on NC      Problem: Respiratory  Goal: Patient will achieve/maintain optimum respiratory ventilation and gas exchange  Outcome: Not Progressing  Flowsheets (Taken 9/1/2021 1042)  O2 Delivery Device: Silicone Nasal Cannula  Incentive Spirometer:   Ineffective, Needs Coaching   Weak Effort  Deep Breathe and Cough: Needs Coaching

## 2021-09-01 NOTE — DISCHARGE PLANNING
Thank you for sending this referral to [facility]. Patient last saw their PCP on 4/7/2020. Patient will need to reestablish with PCP before HH can accept.     Referral placed on hold.     Thanks,   [facility]

## 2021-09-01 NOTE — PROGRESS NOTES
Hospital Medicine Daily Progress Note    Date of Service  9/1/2021    Chief Complaint  Sanam Andre is a 61 y.o. female admitted 8/29/2021 with COVID 19 pna    Hospital Course:    61-year-old female with history of COPD, rheumatic arthritis and chronic methadone who presented 8/29/2021 with shortness of breath.  Patient had worsening shortness of breath for last couple days before the admission with a dry coughing and chills, she has some nausea with no urinary symptoms and no diarrhea, she has significant generalized weakness, she was brought in by EMS for worsening shortness of breath, chest x-ray showed bilateral pneumonia, COVID-19 was positive, patient needed 8 L nasal cannula, dexamethasone was a started and remdesivir was initiated by ID.  Lower extremity edema did not show DVT.      EKG demonstrated left bundle branch block which is old. Echo demonstrated regional wall motion abnormality with EF of 45%, with septal abnormal motion, CXR demonstrated right sided opacity.  Troponin trending down, cardiologist did not think as a STEMI, patient is to follow-up with cardiology clinic for more work-up after treating Covid infection, to continue aspirin and atorvastatin.      Interval Problem Update  -Evaluating examined the patient at bedside, more week and some disorientation  -Worsening oxygen requirement and she is on mask 10 L  -Case was discussed with infectious disease Dr. Ortiz, since patient is not on high flow oxygen patient is not qualified for toci, and to continue remdesivir at this time.  -Case was discussed with her daughter, updated her about her mother's situation also answered all her questions.  -Replace potassium  -Atrial blood gas showed acute respiratory failure    Consultants/Specialty  infectious disease     Code Status  Full Code    Disposition  Patient is not medically cleared.  Worsening oxygen requirement  Anticipate discharge to to home with organized home healthcare and close  outpatient follow-up.  I have placed the appropriate orders for post-discharge needs.    Review of Systems  Review of Systems   Constitutional: Positive for malaise/fatigue. Negative for chills and fever.   HENT: Negative for congestion and nosebleeds.    Eyes: Negative for blurred vision and double vision.   Respiratory: Positive for cough and shortness of breath. Negative for hemoptysis and wheezing.    Cardiovascular: Negative for chest pain, palpitations and claudication.   Gastrointestinal: Negative for heartburn, nausea and vomiting.   Genitourinary: Negative for hematuria and urgency.   Musculoskeletal: Negative for back pain and myalgias.   Skin: Negative for rash.   Neurological: Negative for dizziness and headaches.   Endo/Heme/Allergies: Does not bruise/bleed easily.   Psychiatric/Behavioral: Negative for depression.        Physical Exam  Temp:  [36 °C (96.8 °F)-36.4 °C (97.6 °F)] 36.3 °C (97.4 °F)  Pulse:  [69-99] 80  Resp:  [16-22] 17  BP: (136-155)/(56-98) 136/92  SpO2:  [86 %-93 %] 89 %    Physical Exam  Vitals and nursing note reviewed.   Constitutional:       Appearance: Normal appearance.   HENT:      Head: Normocephalic and atraumatic.      Nose: Nose normal.      Mouth/Throat:      Mouth: Mucous membranes are dry.   Eyes:      General: No scleral icterus.        Right eye: No discharge.         Left eye: No discharge.   Cardiovascular:      Rate and Rhythm: Normal rate and regular rhythm.      Pulses: Normal pulses.      Heart sounds: Normal heart sounds.   Pulmonary:      Effort: Pulmonary effort is normal. No respiratory distress.      Breath sounds: Rales present.   Abdominal:      General: Bowel sounds are normal. There is no distension.      Palpations: Abdomen is soft.      Tenderness: There is no abdominal tenderness. There is no guarding.   Musculoskeletal:         General: Normal range of motion.      Cervical back: Normal range of motion and neck supple.      Right lower leg: No edema.    Skin:     General: Skin is warm and dry.      Capillary Refill: Capillary refill takes less than 2 seconds.      Coloration: Skin is not jaundiced.   Neurological:      General: No focal deficit present.      Mental Status: She is alert and oriented to person, place, and time.      Cranial Nerves: No cranial nerve deficit.      Comments: Some confusing and weakness   Psychiatric:         Mood and Affect: Mood normal.         Fluids  No intake or output data in the 24 hours ending 09/01/21 1215    Laboratory  Recent Labs     08/30/21  0305 08/31/21  0114 09/01/21  0637   WBC 2.7* 8.0 8.8   RBC 4.59 4.45 4.85   HEMOGLOBIN 16.4* 16.0 17.3*   HEMATOCRIT 48.0* 46.7 50.5*   .6* 104.9* 104.1*   MCH 35.7* 36.0* 35.7*   MCHC 34.2 34.3 34.3   RDW 49.3 49.4 49.6   PLATELETCT 146* 180 151*   MPV 11.4 11.0 11.3     Recent Labs     08/30/21  0305 08/31/21  0114 09/01/21  0748   SODIUM 139 138 139   POTASSIUM 3.5* 3.4* 3.0*   CHLORIDE 102 103 105   CO2 23 22 22   GLUCOSE 127* 134* 89   BUN 21 25* 20   CREATININE 0.52 0.41* 0.54   CALCIUM 9.7 9.4 9.4     Recent Labs     08/29/21  1420   APTT 34.5   INR 1.07               Imaging  DX-CHEST-LIMITED (1 VIEW)   Final Result      1.  Stable patchy bilateral basilar opacities, right greater than left.   2.  Stable enlargement of the cardiomediastinal silhouette.      US-EXTREMITY VENOUS LOWER BILAT   Final Result      DX-CHEST-PORTABLE (1 VIEW)   Final Result      Ill-defined right basilar opacities may represent atelectasis or consolidation. Pneumonia can be considered in the appropriate clinical settings. Trace right pleural effusion.      EC-ECHOCARDIOGRAM COMPLETE W/O CONT   Final Result           Assessment/Plan  * Acute hypoxemic respiratory failure due to COVID-19 (HCC)  Assessment & Plan  COVID positive  Continue decadron and remedies severe by ID  Case was discussed with ID on 9/1 since patient is not on high flow oxygen, not qualified for Toci  On 9/1 worsening oxygen  requirement and mask 10 L  Chest x-ray stable infiltration stable pneumonia  Arterial blood gas; showed acute respiratory failure    Leukopenia  Assessment & Plan  Probably due to covid, continue close monitoring.   Resolved    Macrocytosis- (present on admission)  Assessment & Plan  B12 was normal  Stable    Thrombocytopenia (HCC)  Assessment & Plan    monitor    Methadone dependence (HCC)  Assessment & Plan  Restart at 30 mg today,discussed with pharmacist.     Hypokalemia  Assessment & Plan  replacing    Elevated troponin- (present on admission)  Assessment & Plan  No chest pain, cardio Dr To consulted by ERP  Echo showed EF 45% with  Abnormal septal motion consistent with left bundle branch block, will need ischemic workup when recover from covid.   Low dose lisinopril  Hold bb's for now due to mild bradycardia.     LBBB (left bundle branch block)  Assessment & Plan  As identified on hold EKG  Cardiology did not think it is a STEMI  No chest pain and troponin trending down  Patient is to follow-up with heart failure clinic as outpatient       VTE prophylaxis: enoxaparin ppx and heparin ppx

## 2021-09-01 NOTE — PROGRESS NOTES
Rec'd report from previous nurse regarding prior 12 hours.  POC reviewed with pt.  White board updated.  Pt verbalizes understanding.  Call light within reach.

## 2021-09-01 NOTE — PROGRESS NOTES
[facility] Medicine Daily Progress Note    Date of Service  8/31/2021    Chief Complaint  Sanam Andre is a 61 y.o. female admitted 8/29/2021 with COVID 19 pna    Hospital Course:    61-year-old female with history of COPD, rheumatic arthritis and chronic methadone who presented 8/29/2021 with shortness of breath.  Patient had worsening shortness of breath for last couple days before the admission with a dry coughing and chills, she has some nausea with no urinary symptoms and no diarrhea, she has significant generalized weakness, she was brought in by EMS for worsening shortness of breath, chest x-ray showed bilateral pneumonia, COVID-19 was positive, patient needed 8 L nasal cannula, dexamethasone was a started and remdesivir was initiated by ID.  Lower extremity edema did not show DVT.      EKG demonstrated left bundle branch block which is old. Echo demonstrated regional wall motion abnormality with EF of 45%, with septal abnormal motion, CXR demonstrated right sided opacity.  Troponin trending down, cardiologist did not think as a STEMI, patient is to follow-up with cardiology clinic for more work-up after treating Covid infection, to continue aspirin and atorvastatin.      Interval Problem Update  -Evaluating examined the patient at bedside, patient feels better however she is still on 6 L nasal cannula  -Oxygen was ordered for possible discharge tomorrow  -Start with aspirin and atorvastatin  -Replace potassium      Consultants/Specialty  infectious disease     Code Status  Full Code    Disposition  Patient is not medically cleared.  Possible tomorrow home with oxygen.  Anticipate discharge to to home with organized home healthcare and close outpatient follow-up.  I have placed the appropriate orders for post-discharge needs.    Review of Systems  Review of Systems   Constitutional: Positive for malaise/fatigue. Negative for chills and fever.   HENT: Negative for congestion and nosebleeds.    Eyes:  Negative for blurred vision and double vision.   Respiratory: Positive for cough and shortness of breath. Negative for hemoptysis and wheezing.    Cardiovascular: Negative for chest pain, palpitations and claudication.   Gastrointestinal: Negative for heartburn, nausea and vomiting.   Genitourinary: Negative for hematuria and urgency.   Musculoskeletal: Negative for back pain and myalgias.   Skin: Negative for rash.   Neurological: Negative for dizziness and headaches.   Endo/Heme/Allergies: Does not bruise/bleed easily.   Psychiatric/Behavioral: Negative for depression.        Physical Exam  Temp:  [36.4 °C (97.5 °F)-36.8 °C (98.2 °F)] 36.4 °C (97.6 °F)  Pulse:  [68-77] 73  Resp:  [16-18] 16  BP: (134-149)/(86-98) 149/98  SpO2:  [86 %-91 %] 86 %    Physical Exam  Vitals and nursing note reviewed.   Constitutional:       Appearance: Normal appearance.   HENT:      Head: Normocephalic and atraumatic.      Nose: Nose normal.      Mouth/Throat:      Mouth: Mucous membranes are dry.   Eyes:      General: No scleral icterus.        Right eye: No discharge.         Left eye: No discharge.   Cardiovascular:      Rate and Rhythm: Normal rate and regular rhythm.      Pulses: Normal pulses.      Heart sounds: Normal heart sounds.   Pulmonary:      Effort: Pulmonary effort is normal. No respiratory distress.      Breath sounds: Rales present.   Abdominal:      General: Bowel sounds are normal. There is no distension.      Palpations: Abdomen is soft.      Tenderness: There is no abdominal tenderness. There is no guarding.   Musculoskeletal:         General: Normal range of motion.      Cervical back: Normal range of motion and neck supple.      Right lower leg: No edema.   Skin:     General: Skin is warm and dry.      Capillary Refill: Capillary refill takes less than 2 seconds.      Coloration: Skin is not jaundiced.   Neurological:      General: No focal deficit present.      Mental Status: She is alert and oriented to  person, place, and time.      Cranial Nerves: No cranial nerve deficit.   Psychiatric:         Mood and Affect: Mood normal.         Fluids  No intake or output data in the 24 hours ending 08/31/21 1758    Laboratory  Recent Labs     08/29/21  1131 08/30/21  0305 08/31/21  0114   WBC 6.8 2.7* 8.0   RBC 4.70 4.59 4.45   HEMOGLOBIN 16.8* 16.4* 16.0   HEMATOCRIT 48.8* 48.0* 46.7   .8* 104.6* 104.9*   MCH 35.7* 35.7* 36.0*   MCHC 34.4 34.2 34.3   RDW 48.7 49.3 49.4   PLATELETCT 146* 146* 180   MPV 11.1 11.4 11.0     Recent Labs     08/29/21  1131 08/30/21  0305 08/31/21  0114   SODIUM 136 139 138   POTASSIUM 3.3* 3.5* 3.4*   CHLORIDE 99 102 103   CO2 24 23 22   GLUCOSE 88 127* 134*   BUN 21 21 25*   CREATININE 0.67 0.52 0.41*   CALCIUM 8.9 9.7 9.4     Recent Labs     08/29/21  1420   APTT 34.5   INR 1.07               Imaging  US-EXTREMITY VENOUS LOWER BILAT   Final Result      DX-CHEST-PORTABLE (1 VIEW)   Final Result      Ill-defined right basilar opacities may represent atelectasis or consolidation. Pneumonia can be considered in the appropriate clinical settings. Trace right pleural effusion.      EC-ECHOCARDIOGRAM COMPLETE W/O CONT   Final Result           Assessment/Plan  Acute hypoxemic respiratory failure due to COVID-19 (HCC)  Assessment & Plan  COVID positive  Continue decadron  Remdesivir was initiated by ID  On 6L of o2      Leukopenia  Assessment & Plan  Probably due to covid, continue close monitoring.   Resolved    Macrocytosis- (present on admission)  Assessment & Plan  B12 was normal  Stable    Thrombocytopenia (HCC)  Assessment & Plan    monitor    Methadone dependence (HCC)  Assessment & Plan  Restart at 30 mg today,discussed with pharmacist.     Hypokalemia  Assessment & Plan  replacing    Elevated troponin- (present on admission)  Assessment & Plan  No chest pain, cardio Dr To consulted by ERP  Echo showed EF 45% with  Abnormal septal motion consistent with left bundle branch block,  will need ischemic workup when recover from covid.   Low dose lisinopril  Hold bb's for now due to mild bradycardia.     LBBB (left bundle branch block)  Assessment & Plan  As identified on hold EKG  Cardiology did not think it is a STEMI  No chest pain and troponin trending down  Patient is to follow-up with heart failure clinic as outpatient       VTE prophylaxis: enoxaparin ppx and heparin ppx

## 2021-09-02 PROBLEM — R53.81 DEBILITY: Status: ACTIVE | Noted: 2021-09-02

## 2021-09-02 LAB
ALBUMIN SERPL BCP-MCNC: 3.8 G/DL (ref 3.2–4.9)
ALBUMIN/GLOB SERPL: 0.8 G/DL
ALP SERPL-CCNC: 96 U/L (ref 30–99)
ALT SERPL-CCNC: 18 U/L (ref 2–50)
ANION GAP SERPL CALC-SCNC: 13 MMOL/L (ref 7–16)
ANION GAP SERPL CALC-SCNC: 16 MMOL/L (ref 7–16)
AST SERPL-CCNC: 25 U/L (ref 12–45)
BASOPHILS # BLD AUTO: 0.1 % (ref 0–1.8)
BASOPHILS # BLD: 0.01 K/UL (ref 0–0.12)
BILIRUB SERPL-MCNC: 0.7 MG/DL (ref 0.1–1.5)
BUN SERPL-MCNC: 22 MG/DL (ref 8–22)
BUN SERPL-MCNC: 25 MG/DL (ref 8–22)
CALCIUM SERPL-MCNC: 9.4 MG/DL (ref 8.5–10.5)
CALCIUM SERPL-MCNC: 9.8 MG/DL (ref 8.5–10.5)
CHLORIDE SERPL-SCNC: 103 MMOL/L (ref 96–112)
CHLORIDE SERPL-SCNC: 104 MMOL/L (ref 96–112)
CO2 SERPL-SCNC: 21 MMOL/L (ref 20–33)
CO2 SERPL-SCNC: 23 MMOL/L (ref 20–33)
CREAT SERPL-MCNC: 0.61 MG/DL (ref 0.5–1.4)
CREAT SERPL-MCNC: 0.7 MG/DL (ref 0.5–1.4)
CRP SERPL HS-MCNC: 4.81 MG/DL (ref 0–0.75)
D DIMER PPP IA.FEU-MCNC: 1.6 UG/ML (FEU) (ref 0–0.5)
EOSINOPHIL # BLD AUTO: 0 K/UL (ref 0–0.51)
EOSINOPHIL NFR BLD: 0 % (ref 0–6.9)
ERYTHROCYTE [DISTWIDTH] IN BLOOD BY AUTOMATED COUNT: 48.5 FL (ref 35.9–50)
GLOBULIN SER CALC-MCNC: 5 G/DL (ref 1.9–3.5)
GLUCOSE SERPL-MCNC: 110 MG/DL (ref 65–99)
GLUCOSE SERPL-MCNC: 89 MG/DL (ref 65–99)
HCT VFR BLD AUTO: 48.7 % (ref 37–47)
HGB BLD-MCNC: 16.7 G/DL (ref 12–16)
IMM GRANULOCYTES # BLD AUTO: 0.06 K/UL (ref 0–0.11)
IMM GRANULOCYTES NFR BLD AUTO: 0.7 % (ref 0–0.9)
LYMPHOCYTES # BLD AUTO: 0.77 K/UL (ref 1–4.8)
LYMPHOCYTES NFR BLD: 9 % (ref 22–41)
MAGNESIUM SERPL-MCNC: 2.9 MG/DL (ref 1.5–2.5)
MCH RBC QN AUTO: 35.6 PG (ref 27–33)
MCHC RBC AUTO-ENTMCNC: 34.3 G/DL (ref 33.6–35)
MCV RBC AUTO: 103.8 FL (ref 81.4–97.8)
MONOCYTES # BLD AUTO: 0.57 K/UL (ref 0–0.85)
MONOCYTES NFR BLD AUTO: 6.7 % (ref 0–13.4)
NEUTROPHILS # BLD AUTO: 7.12 K/UL (ref 2–7.15)
NEUTROPHILS NFR BLD: 83.5 % (ref 44–72)
NRBC # BLD AUTO: 0 K/UL
NRBC BLD-RTO: 0 /100 WBC
PLATELET # BLD AUTO: 206 K/UL (ref 164–446)
PMV BLD AUTO: 10.9 FL (ref 9–12.9)
POTASSIUM SERPL-SCNC: 4.1 MMOL/L (ref 3.6–5.5)
POTASSIUM SERPL-SCNC: 4.4 MMOL/L (ref 3.6–5.5)
PROT SERPL-MCNC: 8.8 G/DL (ref 6–8.2)
RBC # BLD AUTO: 4.69 M/UL (ref 4.2–5.4)
SODIUM SERPL-SCNC: 140 MMOL/L (ref 135–145)
SODIUM SERPL-SCNC: 140 MMOL/L (ref 135–145)
WBC # BLD AUTO: 8.5 K/UL (ref 4.8–10.8)

## 2021-09-02 PROCEDURE — A9270 NON-COVERED ITEM OR SERVICE: HCPCS | Performed by: INTERNAL MEDICINE

## 2021-09-02 PROCEDURE — 85025 COMPLETE CBC W/AUTO DIFF WBC: CPT

## 2021-09-02 PROCEDURE — 700102 HCHG RX REV CODE 250 W/ 637 OVERRIDE(OP): Performed by: INTERNAL MEDICINE

## 2021-09-02 PROCEDURE — 36415 COLL VENOUS BLD VENIPUNCTURE: CPT

## 2021-09-02 PROCEDURE — A9270 NON-COVERED ITEM OR SERVICE: HCPCS | Performed by: HOSPITALIST

## 2021-09-02 PROCEDURE — 85379 FIBRIN DEGRADATION QUANT: CPT

## 2021-09-02 PROCEDURE — 83735 ASSAY OF MAGNESIUM: CPT

## 2021-09-02 PROCEDURE — 700111 HCHG RX REV CODE 636 W/ 250 OVERRIDE (IP): Performed by: INTERNAL MEDICINE

## 2021-09-02 PROCEDURE — 700105 HCHG RX REV CODE 258: Performed by: INTERNAL MEDICINE

## 2021-09-02 PROCEDURE — 700102 HCHG RX REV CODE 250 W/ 637 OVERRIDE(OP): Performed by: HOSPITALIST

## 2021-09-02 PROCEDURE — 80048 BASIC METABOLIC PNL TOTAL CA: CPT

## 2021-09-02 PROCEDURE — 99232 SBSQ HOSP IP/OBS MODERATE 35: CPT | Performed by: INTERNAL MEDICINE

## 2021-09-02 PROCEDURE — 97166 OT EVAL MOD COMPLEX 45 MIN: CPT

## 2021-09-02 PROCEDURE — 86140 C-REACTIVE PROTEIN: CPT

## 2021-09-02 PROCEDURE — 770001 HCHG ROOM/CARE - MED/SURG/GYN PRIV*

## 2021-09-02 PROCEDURE — 80053 COMPREHEN METABOLIC PANEL: CPT

## 2021-09-02 PROCEDURE — 700101 HCHG RX REV CODE 250: Performed by: INTERNAL MEDICINE

## 2021-09-02 PROCEDURE — 97161 PT EVAL LOW COMPLEX 20 MIN: CPT

## 2021-09-02 RX ADMIN — LISINOPRIL 2.5 MG: 5 TABLET ORAL at 05:38

## 2021-09-02 RX ADMIN — ENOXAPARIN SODIUM 40 MG: 40 INJECTION SUBCUTANEOUS at 05:38

## 2021-09-02 RX ADMIN — ATORVASTATIN CALCIUM 40 MG: 40 TABLET, FILM COATED ORAL at 18:10

## 2021-09-02 RX ADMIN — HYDROXYZINE HYDROCHLORIDE 10 MG: 10 TABLET, FILM COATED ORAL at 09:06

## 2021-09-02 RX ADMIN — GUAIFENESIN 600 MG: 600 TABLET, EXTENDED RELEASE ORAL at 05:39

## 2021-09-02 RX ADMIN — DEXAMETHASONE 6 MG: 4 TABLET ORAL at 05:37

## 2021-09-02 RX ADMIN — DOCUSATE SODIUM 50 MG AND SENNOSIDES 8.6 MG 2 TABLET: 8.6; 5 TABLET, FILM COATED ORAL at 18:11

## 2021-09-02 RX ADMIN — FUROSEMIDE 40 MG: 10 INJECTION, SOLUTION INTRAMUSCULAR; INTRAVENOUS at 05:39

## 2021-09-02 RX ADMIN — BENZONATATE 100 MG: 100 CAPSULE ORAL at 11:40

## 2021-09-02 RX ADMIN — BENZONATATE 100 MG: 100 CAPSULE ORAL at 05:37

## 2021-09-02 RX ADMIN — HYDROXYZINE HYDROCHLORIDE 10 MG: 10 TABLET, FILM COATED ORAL at 14:23

## 2021-09-02 RX ADMIN — BENZONATATE 100 MG: 100 CAPSULE ORAL at 18:11

## 2021-09-02 RX ADMIN — ASPIRIN 81 MG: 81 TABLET, COATED ORAL at 05:36

## 2021-09-02 RX ADMIN — METHADONE HYDROCHLORIDE 86 MG: 10 CONCENTRATE ORAL at 05:36

## 2021-09-02 RX ADMIN — REMDESIVIR 100 MG: 100 INJECTION, POWDER, LYOPHILIZED, FOR SOLUTION INTRAVENOUS at 18:11

## 2021-09-02 RX ADMIN — GUAIFENESIN 600 MG: 600 TABLET, EXTENDED RELEASE ORAL at 18:11

## 2021-09-02 RX ADMIN — POTASSIUM CHLORIDE 40 MEQ: 1500 TABLET, EXTENDED RELEASE ORAL at 05:39

## 2021-09-02 ASSESSMENT — ENCOUNTER SYMPTOMS
COUGH: 1
DEPRESSION: 0
NAUSEA: 0
WHEEZING: 0
CLAUDICATION: 0
BLURRED VISION: 0
HEARTBURN: 0
DIZZINESS: 0
FEVER: 0
MYALGIAS: 0
BACK PAIN: 0
SHORTNESS OF BREATH: 1
HEMOPTYSIS: 0
CHILLS: 0
HEADACHES: 0
VOMITING: 0
DOUBLE VISION: 0
PALPITATIONS: 0
BRUISES/BLEEDS EASILY: 0

## 2021-09-02 ASSESSMENT — GAIT ASSESSMENTS
DISTANCE (FEET): 6
GAIT LEVEL OF ASSIST: MINIMAL ASSIST

## 2021-09-02 ASSESSMENT — COGNITIVE AND FUNCTIONAL STATUS - GENERAL
MOVING FROM LYING ON BACK TO SITTING ON SIDE OF FLAT BED: A LOT
CLIMB 3 TO 5 STEPS WITH RAILING: A LOT
EATING MEALS: A LITTLE
DAILY ACTIVITIY SCORE: 16
PERSONAL GROOMING: A LITTLE
TOILETING: A LOT
SUGGESTED CMS G CODE MODIFIER DAILY ACTIVITY: CK
MOBILITY SCORE: 15
TURNING FROM BACK TO SIDE WHILE IN FLAT BAD: A LITTLE
DRESSING REGULAR UPPER BODY CLOTHING: A LITTLE
MOVING TO AND FROM BED TO CHAIR: A LOT
SUGGESTED CMS G CODE MODIFIER MOBILITY: CK
WALKING IN HOSPITAL ROOM: A LITTLE
DRESSING REGULAR LOWER BODY CLOTHING: A LOT
HELP NEEDED FOR BATHING: A LITTLE
STANDING UP FROM CHAIR USING ARMS: A LITTLE

## 2021-09-02 ASSESSMENT — FIBROSIS 4 INDEX: FIB4 SCORE: 1.77

## 2021-09-02 ASSESSMENT — ACTIVITIES OF DAILY LIVING (ADL): TOILETING: INDEPENDENT

## 2021-09-02 ASSESSMENT — PAIN DESCRIPTION - PAIN TYPE: TYPE: ACUTE PAIN

## 2021-09-02 NOTE — DISCHARGE PLANNING
Received Choice form at [phone]  Agency/Facility Name: [facility]  Referral sent per Choice form @ [phone]

## 2021-09-02 NOTE — THERAPY
Physical Therapy   Initial Evaluation     Patient Name: Sanam Andre  Age:  61 y.o., Sex:  female  Medical Record #: [id number]  Today's Date: 9/2/2021     Precautions  Precautions: Fall Risk    Assessment  Patient is 61 y.o. female with SOB due to COVID-19, thrombocytopenia, and hypokalemia. Pt was found up on bedside commode with nursing. Pt required no physical assist to ambulate to and transfer back into bed, however decreased activity tolerance and SOB was noted. Pt displayed some LE weakness but was still able to complete functional bed mobility/ transfers. Recommend acute PT to increase activity tolerance and ambulatory ability.      Plan    Recommend Physical Therapy 3 times per week until therapy goals are met for the following treatments:  Gait Training, Stair Training, Therapeutic Activities and Therapeutic Exercises    DC Equipment Recommendations: Unable to determine at this time  Discharge Recommendations: Recommend post-acute placement for additional physical therapy services prior to discharge home     Objective       09/02/21 0930   Pain 0 - 10 Group   Therapist Pain Assessment Nurse Notified;Post Activity Pain Same as Prior to Activity;0   Prior Living Situation   Prior Services Home-Independent   Housing / Facility 2 Story Apartment / Condo   Steps Into Home   (FOS)   Steps In Home 0   Equipment Owned Single Point Cane   Lives with - Patient's Self Care Capacity Alone and Able to Care For Self  (daughter is available to help )   Prior Level of Functional Mobility   Bed Mobility Independent   Transfer Status Independent   Ambulation Independent   Distance Ambulation (Feet) 250   Assistive Devices Used Single Point Cane   Stairs Independent   History of Falls   History of Falls No   Cognition    Cognition / Consciousness WDL   Level of Consciousness Alert   Passive ROM Lower Body   Passive ROM Lower Body WDL   Active ROM Lower Body    Active ROM Lower Body  WDL   Strength Lower Body   Lower Body  Strength  X   Gross Strength Generalized Weakness, Equal Bilaterally   Sensation Lower Body   Lower Extremity Sensation   WDL   Lower Body Muscle Tone   Lower Body Muscle Tone  WDL   Coordination Lower Body    Coordination Lower Body  WDL   Balance Assessment   Sitting Balance (Static) Fair   Sitting Balance (Dynamic) Fair   Standing Balance (Static) Fair -   Standing Balance (Dynamic) Fair -   Weight Shift Sitting Fair   Weight Shift Standing Fair   Gait Analysis   Gait Level Of Assist Minimal Assist   Assistive Device None   Distance (Feet) 6   # of Times Distance was Traveled 1   Bed Mobility    Sit to Supine Supervised   Scooting Supervised   Functional Mobility   Sit to Stand Supervised   Bed, Chair, Wheelchair Transfer Supervised   Transfer Method Stand Step   Activity Tolerance   Sitting in Chair   (found up on bedside commode with RN)   Sitting Edge of Bed 3   Standing 3   Short Term Goals    Short Term Goal # 1 Pt will be able to walk 150ft with a SPC or if unavailable a FWW within 6 visits in order to increase mobility

## 2021-09-02 NOTE — PROGRESS NOTES
Hospital Medicine Daily Progress Note    Date of Service  9/2/2021    Chief Complaint  Sanam Andre is a 61 y.o. female admitted 8/29/2021 with COVID 19 pna    Hospital Course:    61-year-old female with history of COPD, rheumatic arthritis and chronic methadone who presented 8/29/2021 with shortness of breath.  Patient had worsening shortness of breath for last couple days before the admission with a dry coughing and chills, she has some nausea with no urinary symptoms and no diarrhea, she has significant generalized weakness, she was brought in by EMS for worsening shortness of breath, chest x-ray showed bilateral pneumonia, COVID-19 was positive, patient needed 8 L nasal cannula, dexamethasone was a started and remdesivir was initiated by ID.  Lower extremity edema did not show DVT.      EKG demonstrated left bundle branch block which is old. Echo demonstrated regional wall motion abnormality with EF of 45%, with septal abnormal motion, CXR demonstrated right sided opacity.  Troponin trending down, cardiologist did not think as a STEMI, patient is to follow-up with cardiology clinic for more work-up after treating Covid infection, to continue aspirin and atorvastatin.      Interval Problem Update  -Evaluating examined the patient at bedside, alert and oriented with generalized weakness  -Some improving on oxygen requirement and she is nasal cannula 6 L  -PT recommended SNF, SNF referral was placed      Consultants/Specialty  infectious disease     Code Status  Full Code    Disposition  Patient is not medically cleared.  High oxygen requirement due to Covid  Anticipate discharge to to home with organized home healthcare and close outpatient follow-up.  I have placed the appropriate orders for post-discharge needs.    Review of Systems  Review of Systems   Constitutional: Positive for malaise/fatigue. Negative for chills and fever.   HENT: Negative for congestion and nosebleeds.    Eyes: Negative for blurred  vision and double vision.   Respiratory: Positive for cough and shortness of breath. Negative for hemoptysis and wheezing.    Cardiovascular: Negative for chest pain, palpitations and claudication.   Gastrointestinal: Negative for heartburn, nausea and vomiting.   Genitourinary: Negative for hematuria and urgency.   Musculoskeletal: Negative for back pain and myalgias.   Skin: Negative for rash.   Neurological: Negative for dizziness and headaches.   Endo/Heme/Allergies: Does not bruise/bleed easily.   Psychiatric/Behavioral: Negative for depression.        Physical Exam  Temp:  [36 °C (96.8 °F)-36.9 °C (98.4 °F)] 36.1 °C (97 °F)  Pulse:  [72-87] 87  Resp:  [16-20] 20  BP: (126-143)/() 130/87  SpO2:  [81 %-93 %] 91 %    Physical Exam  Vitals and nursing note reviewed.   Constitutional:       Appearance: Normal appearance.   HENT:      Head: Normocephalic and atraumatic.      Nose: Nose normal.      Mouth/Throat:      Mouth: Mucous membranes are dry.   Eyes:      General: No scleral icterus.        Right eye: No discharge.         Left eye: No discharge.   Cardiovascular:      Rate and Rhythm: Normal rate and regular rhythm.      Pulses: Normal pulses.      Heart sounds: Normal heart sounds.   Pulmonary:      Effort: Pulmonary effort is normal. No respiratory distress.      Breath sounds: Rales present.   Abdominal:      General: Bowel sounds are normal. There is no distension.      Palpations: Abdomen is soft.      Tenderness: There is no abdominal tenderness. There is no guarding.   Musculoskeletal:         General: Normal range of motion.      Cervical back: Normal range of motion and neck supple.      Right lower leg: No edema.   Skin:     General: Skin is warm and dry.      Capillary Refill: Capillary refill takes less than 2 seconds.      Coloration: Skin is not jaundiced.   Neurological:      General: No focal deficit present.      Mental Status: She is alert and oriented to person, place, and time.       Cranial Nerves: No cranial nerve deficit.      Comments: Some confusing and weakness   Psychiatric:         Mood and Affect: Mood normal.         Fluids    Intake/Output Summary (Last 24 hours) at 9/2/2021 1414  Last data filed at 9/2/2021 0900  Gross per 24 hour   Intake 360 ml   Output 400 ml   Net -40 ml       Laboratory  Recent Labs     08/31/21  0114 09/01/21  0637 09/02/21  0041   WBC 8.0 8.8 8.5   RBC 4.45 4.85 4.69   HEMOGLOBIN 16.0 17.3* 16.7*   HEMATOCRIT 46.7 50.5* 48.7*   .9* 104.1* 103.8*   MCH 36.0* 35.7* 35.6*   MCHC 34.3 34.3 34.3   RDW 49.4 49.6 48.5   PLATELETCT 180 151* 206   MPV 11.0 11.3 10.9     Recent Labs     09/01/21  0748 09/02/21  0041 09/02/21  0855   SODIUM 139 140 140   POTASSIUM 3.0* 4.1 4.4   CHLORIDE 105 104 103   CO2 22 23 21   GLUCOSE 89 89 110*   BUN 20 22 25*   CREATININE 0.54 0.61 0.70   CALCIUM 9.4 9.4 9.8                   Imaging  IR-US GUIDED PIV   Final Result    Ultrasound-guided PERIPHERAL IV INSERTION performed by    qualified nursing staff as above.      DX-CHEST-LIMITED (1 VIEW)   Final Result      1.  Stable patchy bilateral basilar opacities, right greater than left.   2.  Stable enlargement of the cardiomediastinal silhouette.      US-EXTREMITY VENOUS LOWER BILAT   Final Result      DX-CHEST-PORTABLE (1 VIEW)   Final Result      Ill-defined right basilar opacities may represent atelectasis or consolidation. Pneumonia can be considered in the appropriate clinical settings. Trace right pleural effusion.      EC-ECHOCARDIOGRAM COMPLETE W/O CONT   Final Result           Assessment/Plan  * Acute hypoxemic respiratory failure due to COVID-19 (HCC)  Assessment & Plan  COVID positive  Continue decadron and remedies severe by ID  Case was discussed with ID on 9/1 since patient is not on high flow oxygen, not qualified for Toci  On 9/1 worsening oxygen requirement and mask 10 L  On 9/2 improving oxygen requirement on 6 L nasal cannula  Chest x-ray stable infiltration  stable pneumonia  Arterial blood gas; showed acute respiratory failure    Debility  Assessment & Plan  Generalized weakness  Due to infection  PT and OT recommended postacute placement  SNF referral was placed    Leukopenia  Assessment & Plan  Probably due to covid, continue close monitoring.   Resolved    Macrocytosis- (present on admission)  Assessment & Plan  B12 was normal  Stable    Thrombocytopenia (HCC)  Assessment & Plan    monitor    Methadone dependence (HCC)  Assessment & Plan  Restart at 30 mg today,discussed with pharmacist.     Hypokalemia  Assessment & Plan  replacing    Elevated troponin- (present on admission)  Assessment & Plan  No chest pain, cardio Dr To consulted by ERP  Echo showed EF 45% with  Abnormal septal motion consistent with left bundle branch block, will need ischemic workup when recover from covid.   Low dose lisinopril  Hold bb's for now due to mild bradycardia.     LBBB (left bundle branch block)  Assessment & Plan  As identified on hold EKG  Cardiology did not think it is a STEMI  No chest pain and troponin trending down  Patient is to follow-up with heart failure clinic as outpatient       VTE prophylaxis: enoxaparin ppx and heparin ppx    I have performed a physical exam and reviewed and updated ROS and Plan today (9/2/2021). In review of yesterday's note (9/1/2021), there are no changes except as documented above.

## 2021-09-02 NOTE — THERAPY
"Occupational Therapy   Initial Evaluation     Patient Name: Sanam Andre  Age:  61 y.o., Sex:  female  Medical Record #: [id number]  Today's Date: 9/2/2021     Precautions  Precautions: Fall Risk    Assessment  Patient is 61 y.o. female admitted for SOB w/ COVID, thrombocytopenia, hypokalemia. Pt normally independent with mobility using a SPC and ADLs living in a 2nd floor apartment with daughter. Pt found on bedside commode upon arrival, required [place] for mobility, maxA for lower body ADLs and toileting at time due to lethargy and debility. Will continue to see for skilled therapy while admitted as well as recommend post-acute placement.     Plan    Recommend Occupational Therapy 3 times per week until therapy goals are met for the following treatments:  Adaptive Equipment, Neuro Re-Education / Balance, Self Care/Activities of Daily Living, Therapeutic Activities and Therapeutic Exercises.    DC Equipment Recommendations: (P) Unable to determine at this time  Discharge Recommendations: (P) Recommend post-acute placement for additional occupational therapy services prior to discharge home     Subjective    \"I'm weak\"     Objective       09/02/21 0940   Prior Living Situation   Prior Services Home-Independent   Housing / Facility 2 Story Apartment / Condo   Steps Into Home   (FOS)   Steps In Home 0   Bathroom Set up Bathtub / Shower Combination;Shower Chair   Equipment Owned Single Point Cane;Tub / Shower Seat   Lives with - Patient's Self Care Capacity Adult Children   Prior Level of ADL Function   Self Feeding Independent   Grooming / Hygiene Independent   Bathing Independent   Dressing Independent   Toileting Independent   Prior Level of IADL Function   Medication Management Independent   Laundry Independent   Kitchen Mobility Independent   Finances Independent   Home Management Independent   Shopping Independent   Prior Level Of Mobility Independent With Device in Community   Driving / Transportation Relatives " / Others Provide Transportation   Occupation (Pre-Hospital Vocational) Retired Due To Disability   History of Falls   History of Falls No   Precautions   Precautions Fall Risk   Pain 0 - 10 Group   Therapist Pain Assessment Post Activity Pain Same as Prior to Activity;Nurse Notified   Non Verbal Descriptors   Non Verbal Scale  Calm   Cognition    Cognition / Consciousness WDL   Level of Consciousness Alert   Comments Lethargic, cooperative   Active ROM Upper Body   Active ROM Upper Body  WDL   Dominant Hand Right   Strength Upper Body   Upper Body Strength  X   Gross Strength Generalized Weakness, Equal Bilaterally.    Sensation Upper Body   Upper Extremity Sensation  WDL   Upper Body Muscle Tone   Upper Body Muscle Tone  WDL   Neurological Concerns   Neurological Concerns No   Coordination Upper Body   Coordination WDL   Balance Assessment   Sitting Balance (Static) Fair   Sitting Balance (Dynamic) Fair   Standing Balance (Static) Fair -   Standing Balance (Dynamic) Fair -   Weight Shift Sitting Fair   Weight Shift Standing Fair   Comments w/ HHA   Bed Mobility    Sit to Supine Supervised   Scooting Supervised   ADL Assessment   Upper Body Dressing Supervision   Lower Body Dressing Maximal Assist   Toileting Maximal Assist   How much help from another person does the patient currently need...   Putting on and taking off regular lower body clothing? 2   Bathing (including washing, rinsing, and drying)? 3   Toileting, which includes using a toilet, bedpan, or urinal? 2   Putting on and taking off regular upper body clothing? 3   Taking care of personal grooming such as brushing teeth? 3   Eating meals? 3   6 Clicks Daily Activity Score 16   Functional Mobility   Sit to Stand Supervised   Bed, Chair, Wheelchair Transfer Minimal Assist   Toilet Transfers Minimal Assist   Transfer Method Stand Step   Mobility STS from [facility], steps back to bed, returned to supine   Comments w/ HHA   Visual Perception   Visual Perception   Not Tested   Edema / Skin Assessment   Edema / Skin  Not Assessed   Activity Tolerance   Sitting Edge of Bed 5   Standing 5   Patient / Family Goals   Patient / Family Goal #1 To get better   Short Term Goals   Short Term Goal # 1 Pt will complete ADL transfers with supervision   Short Term Goal # 2 Pt will complete LB dressing with supervision   Short Term Goal # 3 Pt will complete toileting with supervision   Education Group   Education Provided Role of Occupational Therapist   Role of Occupational Therapist Patient Response Patient;Acceptance;Explanation   Problem List   Problem List Decreased Active Daily Living Skills;Decreased Homemaking Skills;Decreased Upper Extremity Strength Right;Decreased Upper Extremity Strength Left;Decreased Functional Mobility;Decreased Activity Tolerance;Impaired Postural Control / Balance   Interdisciplinary Plan of Care Collaboration   IDT Collaboration with  Nursing;Physical Therapist   Patient Position at End of Therapy In Bed;Bed Alarm On;Call Light within Reach;Tray Table within Reach;Phone within Reach   Collaboration Comments RN updated

## 2021-09-02 NOTE — CARE PLAN
Problem: Hemodynamics  Goal: Patient's hemodynamics, fluid balance and neurologic status will be stable or improve  Outcome: Progressing     Problem: Respiratory  Goal: Patient will achieve/maintain optimum respiratory ventilation and gas exchange  Outcome: Not Met   The patient is Stable - Low risk of patient condition declining or worsening    Shift Goals  Clinical Goals: wean O2 down  Patient Goals: go home, rest    Progress made toward(s) clinical / shift goals:  Patient hemodynamically stable. Patient is resting comfortably in bed    Patient is not progressing towards the following goals: Patient requriing supplemental O2      Problem: Respiratory  Goal: Patient will achieve/maintain optimum respiratory ventilation and gas exchange  Outcome: Not Met

## 2021-09-02 NOTE — PROGRESS NOTES
Assumed care at 1900, bedside report received from Dasia SOLORIO. Pt. Is SR on the monitor. Initial assessment completed, orders reviewed, call light within reach, bed alarm not in use, and hourly rounding in place. POC addressed with patient, no additional questions at this time.

## 2021-09-02 NOTE — DISCHARGE PLANNING
Anticipated Discharge Disposition: Home with Home health and O2    Action: Patient needing HH. [facility] as [facility] can assist patient in a telehealth visit and assist with PCP reestablishment to start HH services.     Patient also accepted by [facility]. Unclear if patient and family receive teaching on O2. LSW to clarify and assist in coordinating if teaching is still needed.    Barriers to Discharge: Home health acceptance, DME delivery    Plan: LSW to f/u on referral

## 2021-09-02 NOTE — PROGRESS NOTES
Tried to wean 02, still needs the 6L at rest, just getting up to commode desats to 81% on the 6L, does recover after few min on the 6L, pt SOB and anxious, will monitor. Dr. Araujo was notified of this update.

## 2021-09-02 NOTE — CARE PLAN
The patient is Watcher - Medium risk of patient condition declining or worsening    Shift Goals  Clinical Goals: wean O2 down  Patient Goals: go home, rest    Progress made toward(s) clinical / shift goals:  knowledge, skin, mobility    Patient is not progressing towards the following goals:respiratory, anxiety      Problem: Psychosocial  Goal: Patient's level of anxiety will decrease  Outcome: Not Progressing     Problem: Respiratory  Goal: Patient will achieve/maintain optimum respiratory ventilation and gas exchange  Outcome: Not Progressing

## 2021-09-02 NOTE — ASSESSMENT & PLAN NOTE
Generalized weakness  Due to infection  PT and OT recommended postacute placement  SNF referral was placed

## 2021-09-03 LAB
ALBUMIN SERPL BCP-MCNC: 3.6 G/DL (ref 3.2–4.9)
ALBUMIN/GLOB SERPL: 0.8 G/DL
ALP SERPL-CCNC: 88 U/L (ref 30–99)
ALT SERPL-CCNC: 16 U/L (ref 2–50)
ANION GAP SERPL CALC-SCNC: 12 MMOL/L (ref 7–16)
AST SERPL-CCNC: 17 U/L (ref 12–45)
BACTERIA BLD CULT: NORMAL
BILIRUB SERPL-MCNC: 0.7 MG/DL (ref 0.1–1.5)
BUN SERPL-MCNC: 27 MG/DL (ref 8–22)
CALCIUM SERPL-MCNC: 9.7 MG/DL (ref 8.5–10.5)
CHLORIDE SERPL-SCNC: 104 MMOL/L (ref 96–112)
CO2 SERPL-SCNC: 22 MMOL/L (ref 20–33)
CREAT SERPL-MCNC: 0.75 MG/DL (ref 0.5–1.4)
GLOBULIN SER CALC-MCNC: 4.8 G/DL (ref 1.9–3.5)
GLUCOSE SERPL-MCNC: 97 MG/DL (ref 65–99)
POTASSIUM SERPL-SCNC: 4.4 MMOL/L (ref 3.6–5.5)
PROT SERPL-MCNC: 8.4 G/DL (ref 6–8.2)
SIGNIFICANT IND 70042: NORMAL
SITE SITE: NORMAL
SODIUM SERPL-SCNC: 138 MMOL/L (ref 135–145)
SOURCE SOURCE: NORMAL

## 2021-09-03 PROCEDURE — 700102 HCHG RX REV CODE 250 W/ 637 OVERRIDE(OP): Performed by: INTERNAL MEDICINE

## 2021-09-03 PROCEDURE — 700111 HCHG RX REV CODE 636 W/ 250 OVERRIDE (IP): Performed by: INTERNAL MEDICINE

## 2021-09-03 PROCEDURE — 700105 HCHG RX REV CODE 258: Performed by: INTERNAL MEDICINE

## 2021-09-03 PROCEDURE — 700101 HCHG RX REV CODE 250: Performed by: INTERNAL MEDICINE

## 2021-09-03 PROCEDURE — A9270 NON-COVERED ITEM OR SERVICE: HCPCS | Performed by: HOSPITALIST

## 2021-09-03 PROCEDURE — 700102 HCHG RX REV CODE 250 W/ 637 OVERRIDE(OP): Performed by: HOSPITALIST

## 2021-09-03 PROCEDURE — A9270 NON-COVERED ITEM OR SERVICE: HCPCS | Performed by: INTERNAL MEDICINE

## 2021-09-03 PROCEDURE — 80053 COMPREHEN METABOLIC PANEL: CPT

## 2021-09-03 PROCEDURE — 770001 HCHG ROOM/CARE - MED/SURG/GYN PRIV*

## 2021-09-03 PROCEDURE — 36415 COLL VENOUS BLD VENIPUNCTURE: CPT

## 2021-09-03 PROCEDURE — 99232 SBSQ HOSP IP/OBS MODERATE 35: CPT | Performed by: INTERNAL MEDICINE

## 2021-09-03 RX ADMIN — GUAIFENESIN 600 MG: 600 TABLET, EXTENDED RELEASE ORAL at 05:50

## 2021-09-03 RX ADMIN — BENZONATATE 100 MG: 100 CAPSULE ORAL at 11:31

## 2021-09-03 RX ADMIN — BENZONATATE 100 MG: 100 CAPSULE ORAL at 17:23

## 2021-09-03 RX ADMIN — METHADONE HYDROCHLORIDE 86 MG: 10 CONCENTRATE ORAL at 05:53

## 2021-09-03 RX ADMIN — REMDESIVIR 100 MG: 100 INJECTION, POWDER, LYOPHILIZED, FOR SOLUTION INTRAVENOUS at 18:00

## 2021-09-03 RX ADMIN — LISINOPRIL 2.5 MG: 5 TABLET ORAL at 05:50

## 2021-09-03 RX ADMIN — ENOXAPARIN SODIUM 40 MG: 40 INJECTION SUBCUTANEOUS at 05:48

## 2021-09-03 RX ADMIN — ATORVASTATIN CALCIUM 40 MG: 40 TABLET, FILM COATED ORAL at 17:22

## 2021-09-03 RX ADMIN — BENZONATATE 100 MG: 100 CAPSULE ORAL at 05:49

## 2021-09-03 RX ADMIN — FUROSEMIDE 40 MG: 10 INJECTION, SOLUTION INTRAMUSCULAR; INTRAVENOUS at 08:00

## 2021-09-03 RX ADMIN — ASPIRIN 81 MG: 81 TABLET, COATED ORAL at 05:49

## 2021-09-03 RX ADMIN — DEXAMETHASONE 6 MG: 4 TABLET ORAL at 05:50

## 2021-09-03 RX ADMIN — GUAIFENESIN 600 MG: 600 TABLET, EXTENDED RELEASE ORAL at 17:22

## 2021-09-03 RX ADMIN — HYDROXYZINE HYDROCHLORIDE 10 MG: 10 TABLET, FILM COATED ORAL at 09:00

## 2021-09-03 ASSESSMENT — PAIN DESCRIPTION - PAIN TYPE: TYPE: CHRONIC PAIN;ACUTE PAIN

## 2021-09-03 ASSESSMENT — ENCOUNTER SYMPTOMS
CHILLS: 0
BRUISES/BLEEDS EASILY: 0
CLAUDICATION: 0
HEARTBURN: 0
FEVER: 0
WHEEZING: 0
BLURRED VISION: 0
NAUSEA: 0
DOUBLE VISION: 0
DIZZINESS: 0
PALPITATIONS: 0
DEPRESSION: 0
HEMOPTYSIS: 0
COUGH: 1
SHORTNESS OF BREATH: 1
MYALGIAS: 0
HEADACHES: 0
BACK PAIN: 0
VOMITING: 0

## 2021-09-03 ASSESSMENT — FIBROSIS 4 INDEX: FIB4 SCORE: 1.28

## 2021-09-03 NOTE — DISCHARGE PLANNING
ATTN: Case Management  RE: Referral for Home Health    Reason for referral denial: Patient discharging to SNF              Unfortunately, we are not able to accept this referral for the reason listed above. If further clarity is needed, our Transitional Care Specialists are available to discuss any barriers to service at [phone].      We look forward to collaborating with you in the future,  [facility] Team

## 2021-09-03 NOTE — PROGRESS NOTES
Received report from day shift RNTana. Assumed care of pt. Pt reports no needs at this time. Updated pt on plan of care. Pt resting comfortably in bed. Fall and skin precautions in place. Educated on use of call light. Hourly rounding and continuous pulse ox in place.

## 2021-09-03 NOTE — DISCHARGE PLANNING
1015-  Agency/Facility Name: [facility]   Spoke To: Yolie   Outcome: DPA advised agency to disregard referral notice.    1017-  Agency/Facility Name: [facility]  Spoke To: Compa  Outcome: DPA advised agency to disregard referral notice.

## 2021-09-03 NOTE — CARE PLAN
The patient is Watcher - Medium risk of patient condition declining or worsening    Shift Goals  Clinical Goals: stable oxygenation; discharge pending PT/OT/placement  Patient Goals: Discharge; rest  Family Goals: Discharge pending    Progress made toward(s) clinical / shift goals:  GI, nutrition    Patient is not progressing towards the following goals:respiratory, unable to wean 02, still on 6L      Problem: Respiratory  Goal: Patient will achieve/maintain optimum respiratory ventilation and gas exchange  Outcome: Not Progressing

## 2021-09-03 NOTE — PROGRESS NOTES
12 hr CC    Tele: SR w/ BBB, PC, PVC  HR 64-98  0.18/0.16/0.52    Pt is no longer on tele, now medical

## 2021-09-03 NOTE — DISCHARGE PLANNING
Anticipated Discharge Disposition: SNF    Action: PT/OT now recommending SNF placement. Patient is covid positive. Tested positive on 8/29. Cannot go to SNF until covid recovered. LSW canceled HH referrals and O2 referral.    LSW attempted to call patient's daughter for SNF choice. No answer and no VM set up. LSW to continue making attempts to get [facility].     Barriers to Discharge: covid positive, SNF choice needed    Plan: LSW to continue making attempts to get SNF choice

## 2021-09-03 NOTE — PROGRESS NOTES
"Pt A&Ox4, remains on 6L per NC and unable to wean at this time, does desat to low/mid 80's just getting up to commode but does recover on own in about a min, denies pain but does report anxiety, therapy \"will see pt tomorrow\", home 02 equipment outside door by chance pt does go home, will monitor.  "

## 2021-09-03 NOTE — PROGRESS NOTES
4 Eyes Skin Assessment Completed by Bina EDMONDS RN and Mabel CHOU RN.    Head WDL  Ears Redness and Blanching  Nose Trace red irritation to nostrils beneath cannula  Mouth WDL  Neck WDL  Breast/Chest WDL  Shoulder Blades WDL  Spine Redness and Blanching  (R) Arm/Elbow/Hand Redness and Blanching, bruising, gen. Scabbing, RA nodules to joints  (L) Arm/Elbow/Hand Redness and Blanching, bruising, gen. Scabbing, RA nodules to joints  Abdomen Redness, Blanching and Rash  Groin WDL  Scrotum/Coccyx/Buttocks Redness and Blanching, coccyx very slow blanching  (R) Leg WDL  (L) Leg WDL  (R) Heel/Foot/Toe Redness, Blanching and Boggy  (L) Heel/Foot/Toe Redness, Blanching and Boggy          Devices In Places Pulse Ox and Nasal Cannula      Interventions In Place Gray Ear Foams, Chair Waffle, Waffle Overlay, Barrier Cream, Dri-Josh Pads, Heels Loaded W/Pillows and Pressure Redistribution Mattress    Possible Skin Injury No    Pictures Uploaded Into Epic N/A  Wound Consult Placed No    RN Wound Prevention Protocol Ordered Yes  Pressure injury prevention/moisture-associate irritation prevention

## 2021-09-03 NOTE — PROGRESS NOTES
[facility] Medicine Daily Progress Note    Date of Service  9/3/2021    Chief Complaint  Sanam Andre is a 61 y.o. female admitted 8/29/2021 with COVID 19 pna    Hospital Course:    61-year-old female with history of COPD, rheumatic arthritis and chronic methadone who presented 8/29/2021 with shortness of breath.  Patient had worsening shortness of breath for last couple days before the admission with a dry coughing and chills, she has some nausea with no urinary symptoms and no diarrhea, she has significant generalized weakness, she was brought in by EMS for worsening shortness of breath, chest x-ray showed bilateral pneumonia, COVID-19 was positive, patient needed 8 L nasal cannula, dexamethasone was a started and remdesivir was initiated by ID.  Lower extremity edema did not show DVT.      EKG demonstrated left bundle branch block which is old. Echo demonstrated regional wall motion abnormality with EF of 45%, with septal abnormal motion, CXR demonstrated right sided opacity.  Troponin trending down, cardiologist did not think as a STEMI, patient is to follow-up with cardiology clinic for more work-up after treating Covid infection, to continue aspirin and atorvastatin.      Interval Problem Update  -Evaluating examined the patient at bedside, alert and oriented with generalized weakness  -Daughter wants patient to be SNF before discharge  -Labs reviewed, stable  -Oxygen requirement is a stable      Consultants/Specialty  infectious disease     Code Status  Full Code    Disposition  Patient is not medically cleared.  Oxygen therapy 6 L nasal cannula  Anticipate discharge to to skilled nursing facility.  Very hard placement since patient has Covid infection we'll keep working with PT and OT and hopefully the patient will get better.    I have placed the appropriate orders for post-discharge needs.    Review of Systems  Review of Systems   Constitutional: Positive for malaise/fatigue. Negative for chills and  fever.   HENT: Negative for congestion and nosebleeds.    Eyes: Negative for blurred vision and double vision.   Respiratory: Positive for cough and shortness of breath. Negative for hemoptysis and wheezing.    Cardiovascular: Negative for chest pain, palpitations and claudication.   Gastrointestinal: Negative for heartburn, nausea and vomiting.   Genitourinary: Negative for hematuria and urgency.   Musculoskeletal: Negative for back pain and myalgias.   Skin: Negative for rash.   Neurological: Negative for dizziness and headaches.   Endo/Heme/Allergies: Does not bruise/bleed easily.   Psychiatric/Behavioral: Negative for depression.        Physical Exam  Temp:  [35.8 °C (96.5 °F)-36.4 °C (97.5 °F)] 36.2 °C (97.2 °F)  Pulse:  [69-96] 96  Resp:  [16-18] 16  BP: (125-138)/(81-88) 125/87  SpO2:  [91 %-95 %] 91 %    Physical Exam  Vitals and nursing note reviewed.   Constitutional:       Appearance: Normal appearance.   HENT:      Head: Normocephalic and atraumatic.      Nose: Nose normal.      Mouth/Throat:      Mouth: Mucous membranes are dry.   Eyes:      General: No scleral icterus.        Right eye: No discharge.         Left eye: No discharge.   Cardiovascular:      Rate and Rhythm: Normal rate and regular rhythm.      Pulses: Normal pulses.      Heart sounds: Normal heart sounds.   Pulmonary:      Effort: Pulmonary effort is normal. No respiratory distress.      Breath sounds: Rales present.   Abdominal:      General: Bowel sounds are normal. There is no distension.      Palpations: Abdomen is soft.      Tenderness: There is no abdominal tenderness. There is no guarding.   Musculoskeletal:         General: Normal range of motion.      Cervical back: Normal range of motion and neck supple.      Right lower leg: No edema.   Skin:     General: Skin is warm and dry.      Capillary Refill: Capillary refill takes less than 2 seconds.      Coloration: Skin is not jaundiced.   Neurological:      General: No focal deficit  present.      Mental Status: She is alert and oriented to person, place, and time.      Cranial Nerves: No cranial nerve deficit.      Comments: Some confusing and weakness   Psychiatric:         Mood and Affect: Mood normal.         Fluids    Intake/Output Summary (Last 24 hours) at 9/3/2021 1619  Last data filed at 9/2/2021 1811  Gross per 24 hour   Intake 800 ml   Output --   Net 800 ml       Laboratory  Recent Labs     09/01/21  0637 09/02/21  0041   WBC 8.8 8.5   RBC 4.85 4.69   HEMOGLOBIN 17.3* 16.7*   HEMATOCRIT 50.5* 48.7*   .1* 103.8*   MCH 35.7* 35.6*   MCHC 34.3 34.3   RDW 49.6 48.5   PLATELETCT 151* 206   MPV 11.3 10.9     Recent Labs     09/02/21  0041 09/02/21  0855 09/03/21  0934   SODIUM 140 140 138   POTASSIUM 4.1 4.4 4.4   CHLORIDE 104 103 104   CO2 23 21 22   GLUCOSE 89 110* 97   BUN 22 25* 27*   CREATININE 0.61 0.70 0.75   CALCIUM 9.4 9.8 9.7                   Imaging  IR-US GUIDED PIV   Final Result    Ultrasound-guided PERIPHERAL IV INSERTION performed by    qualified nursing staff as above.      DX-CHEST-LIMITED (1 VIEW)   Final Result      1.  Stable patchy bilateral basilar opacities, right greater than left.   2.  Stable enlargement of the cardiomediastinal silhouette.      US-EXTREMITY VENOUS LOWER BILAT   Final Result      DX-CHEST-PORTABLE (1 VIEW)   Final Result      Ill-defined right basilar opacities may represent atelectasis or consolidation. Pneumonia can be considered in the appropriate clinical settings. Trace right pleural effusion.      EC-ECHOCARDIOGRAM COMPLETE W/O CONT   Final Result           Assessment/Plan  * Acute hypoxemic respiratory failure due to COVID-19 (HCC)  Assessment & Plan  COVID positive  Continue decadron and remedies severe by ID  Case was discussed with ID on 9/1 since patient is not on high flow oxygen, not qualified for Toci  On 9/1 worsening oxygen requirement and mask 10 L  On 9/2 improving oxygen requirement on 6 L nasal cannula  Chest x-ray  stable infiltration stable pneumonia  Arterial blood gas; showed acute respiratory failure    Debility  Assessment & Plan  Generalized weakness  Due to infection  PT and OT recommended postacute placement  SNF referral was placed    Leukopenia  Assessment & Plan  Probably due to covid, continue close monitoring.   Resolved    Macrocytosis- (present on admission)  Assessment & Plan  B12 was normal  Stable    Thrombocytopenia (HCC)  Assessment & Plan    monitor    Methadone dependence (HCC)  Assessment & Plan  Restart at 30 mg today,discussed with pharmacist.     Hypokalemia  Assessment & Plan  replacing    Elevated troponin- (present on admission)  Assessment & Plan  No chest pain, cardio Dr To consulted by ERP  Echo showed EF 45% with  Abnormal septal motion consistent with left bundle branch block, will need ischemic workup when recover from covid.   Low dose lisinopril  Hold bb's for now due to mild bradycardia.     LBBB (left bundle branch block)  Assessment & Plan  As identified on hold EKG  Cardiology did not think it is a STEMI  No chest pain and troponin trending down  Patient is to follow-up with heart failure clinic as outpatient       VTE prophylaxis: enoxaparin ppx and heparin ppx    I have performed a physical exam and reviewed and updated ROS and Plan today (9/3/2021). In review of yesterday's note (9/2/2021), there are no changes except as documented above.

## 2021-09-03 NOTE — CARE PLAN
The patient is Stable - Low risk of patient condition declining or worsening    Shift Goals  Clinical Goals: stable oxygenation; discharge pending PT/OT/placement  Patient Goals: Discharge; rest  Family Goals: Discharge pending    Progress made toward(s) clinical / shift goals:  Pt remains free from falls, pt at rest with no visible anxiety, pt vitals including oxygen WNL; skin precautions in place and no new injuries noted    Problem: Knowledge Deficit - Standard  Goal: Patient and family/care givers will demonstrate understanding of plan of care, disease process/condition, diagnostic tests and medications  Outcome: Progressing     Problem: Psychosocial  Goal: Patient's level of anxiety will decrease  Outcome: Progressing     Problem: Hemodynamics  Goal: Patient's hemodynamics, fluid balance and neurologic status will be stable or improve  Outcome: Progressing     Problem: Respiratory  Goal: Patient will achieve/maintain optimum respiratory ventilation and gas exchange  Outcome: Progressing     Problem: Skin Integrity  Goal: Skin integrity is maintained or improved  Outcome: Progressing       Patient is not progressing towards the following goals:

## 2021-09-04 VITALS
RESPIRATION RATE: 18 BRPM | HEIGHT: 62 IN | WEIGHT: 128.31 LBS | TEMPERATURE: 97.1 F | HEART RATE: 87 BPM | SYSTOLIC BLOOD PRESSURE: 132 MMHG | OXYGEN SATURATION: 93 % | DIASTOLIC BLOOD PRESSURE: 90 MMHG | BODY MASS INDEX: 23.61 KG/M2

## 2021-09-04 PROBLEM — R79.89 ELEVATED TROPONIN: Status: RESOLVED | Noted: 2021-08-30 | Resolved: 2021-09-04

## 2021-09-04 PROBLEM — D72.819 LEUKOPENIA: Status: RESOLVED | Noted: 2021-08-30 | Resolved: 2021-09-04

## 2021-09-04 PROBLEM — E87.6 HYPOKALEMIA: Status: RESOLVED | Noted: 2021-08-29 | Resolved: 2021-09-04

## 2021-09-04 PROCEDURE — 700111 HCHG RX REV CODE 636 W/ 250 OVERRIDE (IP): Performed by: INTERNAL MEDICINE

## 2021-09-04 PROCEDURE — A9270 NON-COVERED ITEM OR SERVICE: HCPCS | Performed by: HOSPITALIST

## 2021-09-04 PROCEDURE — A9270 NON-COVERED ITEM OR SERVICE: HCPCS | Performed by: INTERNAL MEDICINE

## 2021-09-04 PROCEDURE — 700102 HCHG RX REV CODE 250 W/ 637 OVERRIDE(OP): Performed by: HOSPITALIST

## 2021-09-04 PROCEDURE — 99239 HOSP IP/OBS DSCHRG MGMT >30: CPT | Performed by: INTERNAL MEDICINE

## 2021-09-04 PROCEDURE — 700102 HCHG RX REV CODE 250 W/ 637 OVERRIDE(OP): Performed by: INTERNAL MEDICINE

## 2021-09-04 RX ORDER — ATORVASTATIN CALCIUM 40 MG/1
40 TABLET, FILM COATED ORAL EVERY EVENING
Qty: 30 TABLET | Refills: 3 | Status: SHIPPED | OUTPATIENT
Start: 2021-09-04 | End: 2023-02-27

## 2021-09-04 RX ORDER — GUAIFENESIN 600 MG/1
600 TABLET, EXTENDED RELEASE ORAL EVERY 12 HOURS
Qty: 28 TABLET | Refills: 0 | Status: SHIPPED | OUTPATIENT
Start: 2021-09-04 | End: 2021-09-04

## 2021-09-04 RX ORDER — ACETAMINOPHEN 325 MG/1
650 TABLET ORAL EVERY 6 HOURS PRN
Qty: 30 TABLET | Refills: 0 | Status: SHIPPED | OUTPATIENT
Start: 2021-09-04 | End: 2023-02-27

## 2021-09-04 RX ORDER — LISINOPRIL 2.5 MG/1
2.5 TABLET ORAL DAILY
Qty: 30 TABLET | Refills: 3 | Status: SHIPPED | OUTPATIENT
Start: 2021-09-05 | End: 2021-09-04

## 2021-09-04 RX ORDER — GUAIFENESIN 600 MG/1
600 TABLET, EXTENDED RELEASE ORAL EVERY 12 HOURS
Qty: 28 TABLET | Refills: 0 | Status: SHIPPED | OUTPATIENT
Start: 2021-09-04 | End: 2021-11-01

## 2021-09-04 RX ORDER — ASPIRIN 81 MG/1
81 TABLET ORAL DAILY
Qty: 30 TABLET | Refills: 3 | Status: SHIPPED | OUTPATIENT
Start: 2021-09-05 | End: 2023-02-27

## 2021-09-04 RX ORDER — ACETAMINOPHEN 325 MG/1
650 TABLET ORAL EVERY 6 HOURS PRN
Qty: 30 TABLET | Refills: 0 | Status: SHIPPED | OUTPATIENT
Start: 2021-09-04 | End: 2021-09-04

## 2021-09-04 RX ORDER — DEXAMETHASONE 6 MG/1
6 TABLET ORAL DAILY
Qty: 2 TABLET | Refills: 0 | Status: SHIPPED | OUTPATIENT
Start: 2021-09-05 | End: 2021-09-07

## 2021-09-04 RX ORDER — ATORVASTATIN CALCIUM 40 MG/1
40 TABLET, FILM COATED ORAL EVERY EVENING
Qty: 30 TABLET | Refills: 3 | Status: SHIPPED | OUTPATIENT
Start: 2021-09-04 | End: 2021-09-04

## 2021-09-04 RX ORDER — ASPIRIN 81 MG/1
81 TABLET ORAL DAILY
Qty: 30 TABLET | Refills: 3 | Status: SHIPPED | OUTPATIENT
Start: 2021-09-05 | End: 2021-09-04

## 2021-09-04 RX ORDER — LISINOPRIL 2.5 MG/1
2.5 TABLET ORAL DAILY
Qty: 30 TABLET | Refills: 3 | Status: SHIPPED | OUTPATIENT
Start: 2021-09-05 | End: 2021-11-01

## 2021-09-04 RX ADMIN — ASPIRIN 81 MG: 81 TABLET, COATED ORAL at 05:25

## 2021-09-04 RX ADMIN — LISINOPRIL 2.5 MG: 5 TABLET ORAL at 05:25

## 2021-09-04 RX ADMIN — FUROSEMIDE 40 MG: 10 INJECTION, SOLUTION INTRAMUSCULAR; INTRAVENOUS at 05:31

## 2021-09-04 RX ADMIN — DEXAMETHASONE 6 MG: 4 TABLET ORAL at 05:25

## 2021-09-04 RX ADMIN — BENZONATATE 100 MG: 100 CAPSULE ORAL at 05:25

## 2021-09-04 RX ADMIN — GUAIFENESIN 600 MG: 600 TABLET, EXTENDED RELEASE ORAL at 05:25

## 2021-09-04 RX ADMIN — METHADONE HYDROCHLORIDE 86 MG: 10 CONCENTRATE ORAL at 05:25

## 2021-09-04 RX ADMIN — BENZONATATE 100 MG: 100 CAPSULE ORAL at 12:29

## 2021-09-04 RX ADMIN — ENOXAPARIN SODIUM 40 MG: 40 INJECTION SUBCUTANEOUS at 05:25

## 2021-09-04 ASSESSMENT — PAIN DESCRIPTION - PAIN TYPE: TYPE: ACUTE PAIN

## 2021-09-04 NOTE — DISCHARGE INSTRUCTIONS
Discharge Instructions    Discharged to home by car with relative. Discharged via wheelchair, hospital escort: Yes.  Special equipment needed: Oxygen    Be sure to schedule a follow-up appointment with your primary care doctor or any specialists as instructed.     Discharge Plan:   Diet Plan: Discussed  Activity Level: Discussed  Confirmed Follow up Appointment: Patient to Call and Schedule Appointment  Confirmed Symptoms Management: Discussed    I understand that a diet low in cholesterol, fat, and sodium is recommended for good health. Unless I have been given specific instructions below for another diet, I accept this instruction as my diet prescription.   Other diet: Regular as tolerated    Special Instructions: None    · Is patient discharged on Warfarin / Coumadin?   No     Depression / Suicide Risk    As you are discharged from this [facility] facility, it is important to learn how to keep safe from harming yourself.    Recognize the warning signs:  · Abrupt changes in personality, positive or negative- including increase in energy   · Giving away possessions  · Change in eating patterns- significant weight changes-  positive or negative  · Change in sleeping patterns- unable to sleep or sleeping all the time   · Unwillingness or inability to communicate  · Depression  · Unusual sadness, discouragement and loneliness  · Talk of wanting to die  · Neglect of personal appearance   · Rebelliousness- reckless behavior  · Withdrawal from people/activities they love  · Confusion- inability to concentrate     If you or a loved one observes any of these behaviors or has concerns about self-harm, here's what you can do:  · Talk about it- your feelings and reasons for harming yourself  · Remove any means that you might use to hurt yourself (examples: pills, rope, extension cords, firearm)  · Get professional help from the community (Mental Health, Substance Abuse, psychological counseling)  · Do not be alone:Call  your Safe Contact- someone whom you trust who will be there for you.  · Call your local CRISIS HOTLINE 639-7052 or 860-990-3930  · Call your local Children's Mobile Crisis Response Team [place] (571) 018-9450 or www.[employer]  · Call the toll free National Suicide Prevention Hotlines   · National Suicide Prevention Lifeline 127-710-KETW (2650)  · National Hope Line Network 800-SUICIDE (749-5168)      COVID-19  COVID-19 is a respiratory infection that is caused by a virus called severe acute respiratory syndrome coronavirus 2 (SARS-CoV-2). The disease is also known as coronavirus disease or novel coronavirus. In some people, the virus may not cause any symptoms. In others, it may cause a serious infection. The infection can get worse quickly and can lead to complications, such as:  · Pneumonia, or infection of the lungs.  · Acute respiratory distress syndrome or ARDS. This is fluid build-up in the lungs.  · Acute respiratory failure. This is a condition in which there is not enough oxygen passing from the lungs to the body.  · Sepsis or septic shock. This is a serious bodily reaction to an infection.  · Blood clotting problems.  · Secondary infections due to bacteria or fungus.  The virus that causes COVID-19 is contagious. This means that it can spread from person to person through droplets from coughs and sneezes (respiratory secretions).  What are the causes?  This illness is caused by a virus. You may catch the virus by:  · Breathing in droplets from an infected person's cough or sneeze.  · Touching something, like a table or a doorknob, that was exposed to the virus (contaminated) and then touching your mouth, nose, or eyes.  What increases the risk?  Risk for infection  You are more likely to be infected with this virus if you:  · Live in or travel to an area with a COVID-19 outbreak.  · Come in contact with a sick person who recently traveled to an area with a COVID-19 outbreak.  · Provide care  for or live with a person who is infected with COVID-19.  Risk for serious illness  You are more likely to become seriously ill from the virus if you:  · Are 65 years of age or older.  · Have a long-term disease that lowers your body's ability to fight infection (immunocompromised).  · Live in a nursing home or long-term care facility.  · Have a long-term (chronic) disease such as:  ? Chronic lung disease, including chronic obstructive pulmonary disease or asthma  ? Heart disease.  ? Diabetes.  ? Chronic kidney disease.  ? Liver disease.  · Are obese.  What are the signs or symptoms?  Symptoms of this condition can range from mild to severe. Symptoms may appear any time from 2 to 14 days after being exposed to the virus. They include:  · A fever.  · A cough.  · Difficulty breathing.  · Chills.  · Muscle pains.  · A sore throat.  · Loss of taste or smell.  Some people may also have stomach problems, such as nausea, vomiting, or diarrhea.  Other people may not have any symptoms of COVID-19.  How is this diagnosed?  This condition may be diagnosed based on:  · Your signs and symptoms, especially if:  ? You live in an area with a COVID-19 outbreak.  ? You recently traveled to or from an area where the virus is common.  ? You provide care for or live with a person who was diagnosed with COVID-19.  · A physical exam.  · Lab tests, which may include:  ? A nasal swab to take a sample of fluid from your nose.  ? A throat swab to take a sample of fluid from your throat.  ? A sample of mucus from your lungs (sputum).  ? Blood tests.  · Imaging tests, which may include, X-rays, CT scan, or ultrasound.  How is this treated?  At present, there is no medicine to treat COVID-19. Medicines that treat other diseases are being used on a trial basis to see if they are effective against COVID-19.  Your health care provider will talk with you about ways to treat your symptoms. For most people, the infection is mild and can be managed at  home with rest, fluids, and over-the-counter medicines.  Treatment for a serious infection usually takes places in a hospital intensive care unit (ICU). It may include one or more of the following treatments. These treatments are given until your symptoms improve.  · Receiving fluids and medicines through an IV.  · Supplemental oxygen. Extra oxygen is given through a tube in the nose, a face mask, or a suarez.  · Positioning you to lie on your stomach (prone position). This makes it easier for oxygen to get into the lungs.  · Continuous positive airway pressure (CPAP) or bi-level positive airway pressure (BPAP) machine. This treatment uses mild air pressure to keep the airways open. A tube that is connected to a motor delivers oxygen to the body.  · Ventilator. This treatment moves air into and out of the lungs by using a tube that is placed in your windpipe.  · Tracheostomy. This is a procedure to create a hole in the neck so that a breathing tube can be inserted.  · Extracorporeal membrane oxygenation (ECMO). This procedure gives the lungs a chance to recover by taking over the functions of the heart and lungs. It supplies oxygen to the body and removes carbon dioxide.  Follow these instructions at home:  Lifestyle  · If you are sick, stay home except to get medical care. Your health care provider will tell you how long to stay home. Call your health care provider before you go for medical care.  · Rest at home as told by your health care provider.  · Do not use any products that contain nicotine or tobacco, such as cigarettes, e-cigarettes, and chewing tobacco. If you need help quitting, ask your health care provider.  · Return to your normal activities as told by your health care provider. Ask your health care provider what activities are safe for you.  General instructions  · Take over-the-counter and prescription medicines only as told by your health care provider.  · Drink enough fluid to keep your urine pale  yellow.  · Keep all follow-up visits as told by your health care provider. This is important.  How is this prevented?    There is no vaccine to help prevent COVID-19 infection. However, there are steps you can take to protect yourself and others from this virus.  To protect yourself:   · Do not travel to areas where COVID-19 is a risk. The areas where COVID-19 is reported change often. To identify high-risk areas and travel restrictions, check the CDC travel website: wwwnc.cdc.gov/travel/notices  · If you live in, or must travel to, an area where COVID-19 is a risk, take precautions to avoid infection.  ? Stay away from people who are sick.  ? Wash your hands often with soap and water for 20 seconds. If soap and water are not available, use an alcohol-based hand .  ? Avoid touching your mouth, face, eyes, or nose.  ? Avoid going out in public, follow guidance from your state and local health authorities.  ? If you must go out in public, wear a cloth face covering or face mask.  ? Disinfect objects and surfaces that are frequently touched every day. This may include:  § Counters and tables.  § Doorknobs and light switches.  § Sinks and faucets.  § Electronics, such as phones, remote controls, keyboards, computers, and tablets.  To protect others:  If you have symptoms of COVID-19, take steps to prevent the virus from spreading to others.  · If you think you have a COVID-19 infection, contact your health care provider right away. Tell your health care team that you think you may have a COVID-19 infection.  · Stay home. Leave your house only to seek medical care. Do not use public transport.  · Do not travel while you are sick.  · Wash your hands often with soap and water for 20 seconds. If soap and water are not available, use alcohol-based hand .  · Stay away from other members of your household. Let healthy household members care for children and pets, if possible. If you have to care for children  or pets, wash your hands often and wear a mask. If possible, stay in your own room, separate from others. Use a different bathroom.  · Make sure that all people in your household wash their hands well and often.  · Cough or sneeze into a tissue or your sleeve or elbow. Do not cough or sneeze into your hand or into the air.  · Wear a cloth face covering or face mask.  Where to find more information  · Centers for Disease Control and Prevention: www.cdc.gov/coronavirus/2019-ncov/index.html  · World Health Organization: www.who.int/health-topics/coronavirus  Contact a health care provider if:  · You live in or have traveled to an area where COVID-19 is a risk and you have symptoms of the infection.  · You have had contact with someone who has COVID-19 and you have symptoms of the infection.  Get help right away if:  · You have trouble breathing.  · You have pain or pressure in your chest.  · You have confusion.  · You have bluish lips and fingernails.  · You have difficulty waking from sleep.  · You have symptoms that get worse.  These symptoms may represent a serious problem that is an emergency. Do not wait to see if the symptoms will go away. Get medical help right away. Call your local emergency services (911 in the [place] Do not drive yourself to the hospital. Let the emergency medical personnel know if you think you have COVID-19.  Summary  · COVID-19 is a respiratory infection that is caused by a virus. It is also known as coronavirus disease or novel coronavirus. It can cause serious infections, such as pneumonia, acute respiratory distress syndrome, acute respiratory failure, or sepsis.  · The virus that causes COVID-19 is contagious. This means that it can spread from person to person through droplets from coughs and sneezes.  · You are more likely to develop a serious illness if you are 65 years of age or older, have a weak immunity, live in a nursing home, or have chronic disease.  · There is no medicine to  treat COVID-19. Your health care provider will talk with you about ways to treat your symptoms.  · Take steps to protect yourself and others from infection. Wash your hands often and disinfect objects and surfaces that are frequently touched every day. Stay away from people who are sick and wear a mask if you are sick.  This information is not intended to replace advice given to you by your health care provider. Make sure you discuss any questions you have with your health care provider.  Document Released: 01/23/2020 Document Revised: 05/14/2020 Document Reviewed: 01/23/2020  Elsevier Patient Education © 2020 [employer]      Pneumonitis    Pneumonitis is inflammation of the lungs. Infection or exposure to certain substances or allergens can cause this condition. Allergens are substances that you are allergic to.  What are the causes?  This condition may be caused by:  · An infection from bacteria or a virus (pneumonia).  · Exposure to certain substances in the workplace. This includes working on farms and in certain industries. Some substances that can cause this condition include asbestos, silica, inhaled acids, or inhaled chlorine gas.  · Repeated exposure to bird feathers, bird feces, or other allergens.  · Medicines such as chemotherapy drugs, certain antibiotic medicines, and some heart medicines.  · Radiation therapy.  · Exposure to mold. A hot tub, sauna, or home humidifier can have mold growing in it, even if it looks clean. You can breathe in the mold through water vapor.  · Breathing in (aspirating) stomach contents, food, or liquids into the lungs.  What are the signs or symptoms?  Symptoms of this condition include:  · Shortness of breath or trouble breathing. This is the most common symptom.  · Cough.  · Fever.  · Decreased energy.  · Decreased appetite.  How is this diagnosed?  This condition may be diagnosed based on:  · Your medical history.  · Physical exam.  · Blood tests.  · Other tests,  including:  ? Pulmonary function test (PFT). This measures how well your lungs work.  ? Chest X-ray.  ? CT scan of the lungs.  ? Bronchoscopy. In this procedure, your health care provider looks at your airways through an instrument called a bronchoscope.  ? Lung biopsy. In this procedure, your health care provider takes a small piece of tissue from your lungs to examine it.  How is this treated?  Treatment depends on the cause of the condition. If the cause is exposure to a substance, avoiding further exposure to that substance will help reduce your symptoms. Possible medical treatments for pneumonitis include:  · Corticosteroid medicine to help decrease inflammation.  · Antibiotic medicine to help fight an infection caused by bacteria.  · Bronchodilators or inhalers to help relax the muscles and make breathing easier.  · Oxygen therapy, if you are having trouble breathing.  Follow these instructions at home:  · Take or use over-the-counter and prescription medicines only as told by your health care provider. This includes any inhaler use.  · Avoid exposure to any substance that caused your pneumonitis. If you need to work with substances that can cause pneumonitis, wear a mask to protect your lungs.  · If you were prescribed an antibiotic, take it as told by your health care provider. Do not stop taking the antibiotic even if you start to feel better.  · If you were prescribed an inhaler, keep it with you at all times.  · Do not use any products that contain nicotine or tobacco, such as cigarettes and e-cigarettes. If you need help quitting, ask your health care provider.  · Keep all follow-up visits as told by your health care provider. This is important.  Contact a health care provider if:  · You have a fever.  · Your symptoms get worse.  Get help right away if:  · You have new or worse shortness of breath.  · You develop a blue color (cyanosis) under your fingernails.  Summary  · Pneumonitis is inflammation of  the lungs. This condition can be caused by infection or exposure to certain substances or allergens.  · The most common symptom of this condition is shortness of breath or trouble breathing.  · Treatment depends on the cause of your condition.  This information is not intended to replace advice given to you by your health care provider. Make sure you discuss any questions you have with your health care provider.  Document Released: 06/07/2011 Document Revised: 11/30/2018 Document Reviewed: 11/09/2017  Elsevier Patient Education © 2020 [employer] Inc[employer]

## 2021-09-04 NOTE — DISCHARGE SUMMARY
"Discharge Summary    CHIEF COMPLAINT ON ADMISSION  Chief Complaint   Patient presents with   • ALOC     Per family, pt has been \"more and more confused\" over the last few weeks. Pt is normally AOx4. Over the last 5 days, pt has gotten worse. Pt AOx2. Pt has no medical complaint currently and states she hasn't been around anyone sick. Pt has a hx of COPD and RA. Pt takes methadone for pain.        Reason for Admission  Acute respiratory failure with hypoxia due to COVID-19 pneumonia    Admission Date  8/29/2021    CODE STATUS  Full Code    HPI & HOSPITAL COURSE    61-year-old female with history of COPD, rheumatic arthritis and chronic methadone who presented 8/29/2021 with shortness of breath.  Patient had worsening shortness of breath for last couple days before the admission with a dry coughing and chills, she has some nausea with no urinary symptoms and no diarrhea, she has significant generalized weakness, she was brought in by EMS for worsening shortness of breath, chest x-ray showed bilateral pneumonia, COVID-19 was positive, patient needed 8 L nasal cannula, dexamethasone was a started and remdesivir was initiated by ID.  Lower extremity edema did not show DVT, oxygen requirement got worse and patient start with remdesivir but per ID recommendation, patient finished course of dexamethasone remdesivir and her oxygen requirement has been better and stable on 6 L nasal cannula, inflammation markers and D-dimer also improved.    EKG demonstrated left bundle branch block which is old. Echo demonstrated regional wall motion abnormality with EF of 45%, with septal abnormal motion, CXR demonstrated right sided opacity.  Troponin trending down, cardiologist did not think as a STEMI, patient is to follow-up with cardiology clinic for more work-up after treating Covid infection, to continue aspirin and atorvastatin and lisinopril small dose 2.5 mg daily was started to follow-up with cardiology clinic.    Patient was " found to have elevated MCV, B12 and TSH are normal, she has mild thrombocytopenia to follow-up with PCP.    PT and OT evaluated the patient and recommended skilled nursing home, discussed the importance of physical therapy to the patient to prevent falls, patient insisted to leave home, patient refused skilled nursing home referral, the case was discussed with the patient and her daughter and agreed for home health, patient was accepted for home health.    Plan of care was discussed in detail with the patient and her daughter, answered all their questions, on the day of discharge the patient is alert and oriented x4 she has some crackles however not on respiratory distress she is doing okay with 6 L nasal cannula, patient has home health and oxygen at home, patient will be discharged with her daughter who is the primary caregiver.    Therefore, she is discharged in good and stable condition to home with organized home healthcare and close outpatient follow-up.    The patient met 2-midnight criteria for an inpatient stay at the time of discharge.    Discharge Date  09/04/21      FOLLOW UP ITEMS POST DISCHARGE  Follow-up with cardiology clinic for heart failure  Follow-up with PCP for COVID-19 infection and rheumatic arthritis    DISCHARGE DIAGNOSES  Principal Problem:    Acute hypoxemic respiratory failure due to COVID-19 (HCC) POA: Unknown  Active Problems:    Debility POA: Unknown    Methadone dependence (HCC) POA: Unknown    Thrombocytopenia (HCC) POA: Unknown    Macrocytosis POA: Yes    LBBB (left bundle branch block) POA: Unknown  Resolved Problems:    Hypokalemia POA: Unknown    ACS (acute coronary syndrome) (HCC) POA: Unknown    Leukopenia POA: Unknown    Elevated troponin POA: Yes      FOLLOW UP  Ondina Robledo, THOMAS.R.N.  [address] [facility] [id number] [phone]    In 2 weeks      [facility]  [address] [place] [id number] [phone]  In 3  weeks        MEDICATIONS ON DISCHARGE     Medication List      START taking these medications      Instructions   acetaminophen 325 MG Tabs  Commonly known as: Tylenol   Take 2 Tablets by mouth every 6 hours as needed.  Dose: 650 mg     aspirin 81 MG EC tablet  Start taking on: September 5, 2021   Take 1 Tablet by mouth every day.  Dose: 81 mg     atorvastatin 40 MG Tabs  Commonly known as: LIPITOR   Take 1 Tablet by mouth every evening.  Dose: 40 mg     guaiFENesin  MG Tb12  Commonly known as: MUCINEX   Take 1 Tablet by mouth every 12 hours.  Dose: 600 mg     lisinopril 2.5 MG Tabs  Start taking on: September 5, 2021  Commonly known as: PRINIVIL   Take 1 Tablet by mouth every day.  Dose: 2.5 mg        CONTINUE taking these medications      Instructions   methadone 10 MG/ML Conc  Commonly known as: DOLOPHINE   Take 86 mg by mouth every day. 86 mg VERIFIED WITH [employer] CANTER REAGAN GORDON 8/31  Dose: 86 mg            Allergies  Allergies   Allergen Reactions   • Codeine Vomiting   • Iodine Anaphylaxis     Reaction took place during CT and IV dye injection       DIET  Orders Placed This Encounter   Procedures   • Diet Order Diet: Regular     Standing Status:   Standing     Number of Occurrences:   1     Order Specific Question:   Diet:     Answer:   Regular [1]       ACTIVITY  As tolerated.  Weight bearing as tolerated    CONSULTATIONS  Cardiology    PROCEDURES  None    LABORATORY  Lab Results   Component Value Date    SODIUM 138 09/03/2021    POTASSIUM 4.4 09/03/2021    CHLORIDE 104 09/03/2021    CO2 22 09/03/2021    GLUCOSE 97 09/03/2021    BUN 27 (H) 09/03/2021    CREATININE 0.75 09/03/2021    CREATININE 0.7 04/06/2008        Lab Results   Component Value Date    WBC 8.5 09/02/2021    HEMOGLOBIN 16.7 (H) 09/02/2021    HEMATOCRIT 48.7 (H) 09/02/2021    PLATELETCT 206 09/02/2021        Total time of the discharge process exceeds 34 minutes.

## 2021-09-04 NOTE — CARE PLAN
The patient is Stable - Low risk of patient condition declining or worsening    Shift Goals  Clinical Goals: Wean O2, maintain saturation with ambulation'  Patient Goals: Rest, discharge  Family Goals: Discharge/pt comfort    Progress made toward(s) clinical / shift goals:    Problem: Knowledge Deficit - Standard  Goal: Patient and family/care givers will demonstrate understanding of plan of care, disease process/condition, diagnostic tests and medications  Outcome: Progressing     Problem: Psychosocial  Goal: Patient's level of anxiety will decrease  Outcome: Progressing     Problem: Mobility  Goal: Patient's capacity to carry out activities will improve  Outcome: Progressing       Patient is not progressing towards the following goals:

## 2021-09-04 NOTE — PROGRESS NOTES
4 Eyes Skin Assessment Completed by Bina EDMONDS RN and Nikky RILEY RN.     Head WDL  Ears Redness and Blanching  Nose Trace red irritation to nostrils beneath cannula  Mouth WDL  Neck WDL  Breast/Chest WDL  Shoulder Blades WDL  Spine Redness and Blanching  (R) Arm/Elbow/Hand Redness and Blanching, bruising, gen. Scabbing, RA nodules to joints  (L) Arm/Elbow/Hand Redness and Blanching, bruising, gen. Scabbing, RA nodules to joints  Abdomen Redness, Blanching and Rash  Groin WDL  Scrotum/Coccyx/Buttocks Redness and Blanching, coccyx very slow blanching  (R) Leg WDL  (L) Leg WDL  (R) Heel/Foot/Toe Redness, Blanching and Boggy  (L) Heel/Foot/Toe Redness, Blanching and Boggy              Devices In Places Pulse Ox and Nasal Cannula        Interventions In Place Gray Ear Foams, Chair Waffle, Waffle Overlay, Barrier Cream, Dri-Josh Pads, Heels Loaded W/Pillows and Pressure Redistribution Mattress     Possible Skin Injury No     Pictures Uploaded Into Epic N/A  Wound Consult Placed No     RN Wound Prevention Protocol Ordered Yes  Pressure injury prevention/moisture-associate irritation prevention

## 2021-09-04 NOTE — PROGRESS NOTES
Assumed care at 0645. Bedside report received from nightshift LYNDSEY Curran. Patient's chart and MAR reviewed. 12 hour chart check complete. Assessment complete, pt 0/10 pain at this time. Pt is awake in bed. Pt is A & O x 4. Patient was updated on plan of care for the day. Questions answered and concerns addressed.  Pt denies any additional needs at this time. White board updated. Call light, phone and personal belongings within reach. Bed alarm on and working appropriately. Vital signs stable.

## 2021-09-04 NOTE — DISCHARGE PLANNING
Anticipated Discharge Disposition: home with [facility] O2 and [facility]    Action: LSW received updated Home O2 stats. Stats listed below.    HOME O2 Saturation Measurements:(Values must be present for Home Oxygen orders)  Room air sat at rest: 90  Room air sat with amb: 84  With liters of O2: 2, O2 sat at rest with O2: 92  With Liters of O2: 3, O2 sat with amb with O2 : 91  Is the patient mobile?: Yes    LSW to fax new saturations to [facility]. Marjorie from [facility] completed teaching.    Barriers to Discharge: none    Plan: DC home with Home health and O2

## 2021-09-04 NOTE — DISCHARGE PLANNING
Anticipated Discharge Disposition: Home with Home health and O2    Action: Patient discussed in IDT rounds. Per Dr. Araujo, patient refusing SNF and wants to go home with HH. MD okay with patient discharging with HH. LSW requested [facility] be [facility] to [facility] .     Barriers to Discharge: HH acceptance    Plan: LSW to f/u on referral    1130: LSW spoke to Finesse from [facility] . Patient has been accepted by  and they will reach out to patient and family on Tuesday to set up telehealth visit to establish with PCP.     LSW spoke to Tia with [facility]. Tia reports that a new walking O2 test is needed since numbers are from longer than 48 hours ago. LSW requested from bedside RN. LSW to send new numbers to [facility] and then Tia will do O2 teaching with patient.

## 2021-09-04 NOTE — CARE PLAN
The patient is Stable - Low risk of patient condition declining or worsening    Shift Goals  Clinical Goals: stable resp. status; discharge pending SNF acceptance  Patient Goals: Disharge; sleep  Family Goals: Discharge/pt comfort    Progress made toward(s) clinical / shift goals:  Pt remains free from falls thus far in shift; pt resting at ease with no visible anxiety, pt vitals WNL and resp. Status stable; pt waiting on referral acceptance process for SNF    Patient is not progressing towards the following goals:  Pt continues to show poor appetite and intake      Problem: Nutrition  Goal: Patient's nutritional and fluid intake will be adequate or improve  Outcome: Not Progressing

## 2021-09-04 NOTE — PROGRESS NOTES
Received report from Tana murphy RN. Assumed care of pt. Pt reports no needs at this time. Updated pt on plan of care. Pt resting comfortably in bedside chair. Fall and skin precautions in place. Educated on use of call light. Hourly rounding and continuous pulse ox in place. Pt reps. Status stable and no distress noted.

## 2021-09-05 NOTE — PROGRESS NOTES
Pt discharged home with Shasta her daughter. Pt is being discharged with home oxygen; concentrator and home O2 portable tank at bedside and being sent with patient. Pt verbally acknowledges all discharge instructions, medications, and medications regimen. Pt gathered all personal belongings, Tele box and IV have been removed. All questions and needs have been met at this time.

## 2021-09-06 LAB
BACTERIA BLD CULT: NORMAL
SIGNIFICANT IND 70042: NORMAL
SITE SITE: NORMAL
SOURCE SOURCE: NORMAL

## 2021-09-20 ENCOUNTER — HOSPITAL ENCOUNTER (EMERGENCY)
Facility: MEDICAL CENTER | Age: 61
End: 2021-09-21
Attending: EMERGENCY MEDICINE
Payer: MEDICARE

## 2021-09-20 ENCOUNTER — APPOINTMENT (OUTPATIENT)
Dept: RADIOLOGY | Facility: MEDICAL CENTER | Age: 61
End: 2021-09-20
Attending: EMERGENCY MEDICINE
Payer: MEDICARE

## 2021-09-20 DIAGNOSIS — R07.1 CHEST PAIN ON BREATHING: ICD-10-CM

## 2021-09-20 DIAGNOSIS — U09.9 POST-ACUTE SEQUELAE OF COVID-19 (PASC): ICD-10-CM

## 2021-09-20 DIAGNOSIS — F41.9 ANXIETY: ICD-10-CM

## 2021-09-20 DIAGNOSIS — F11.93 METHADONE WITHDRAWAL WITHOUT COMPLICATION (HCC): ICD-10-CM

## 2021-09-20 DIAGNOSIS — R06.02 SOB (SHORTNESS OF BREATH): ICD-10-CM

## 2021-09-20 LAB
ALBUMIN SERPL BCP-MCNC: 3.5 G/DL (ref 3.2–4.9)
ALBUMIN/GLOB SERPL: 0.9 G/DL
ALP SERPL-CCNC: 106 U/L (ref 30–99)
ALT SERPL-CCNC: 13 U/L (ref 2–50)
ANION GAP SERPL CALC-SCNC: 14 MMOL/L (ref 7–16)
AST SERPL-CCNC: 24 U/L (ref 12–45)
BASOPHILS # BLD AUTO: 0.5 % (ref 0–1.8)
BASOPHILS # BLD: 0.02 K/UL (ref 0–0.12)
BILIRUB SERPL-MCNC: 0.3 MG/DL (ref 0.1–1.5)
BUN SERPL-MCNC: 5 MG/DL (ref 8–22)
CALCIUM SERPL-MCNC: 9.5 MG/DL (ref 8.5–10.5)
CHLORIDE SERPL-SCNC: 108 MMOL/L (ref 96–112)
CO2 SERPL-SCNC: 21 MMOL/L (ref 20–33)
CREAT SERPL-MCNC: 0.68 MG/DL (ref 0.5–1.4)
EKG IMPRESSION: NORMAL
EOSINOPHIL # BLD AUTO: 0.11 K/UL (ref 0–0.51)
EOSINOPHIL NFR BLD: 2.6 % (ref 0–6.9)
ERYTHROCYTE [DISTWIDTH] IN BLOOD BY AUTOMATED COUNT: 46.9 FL (ref 35.9–50)
GLOBULIN SER CALC-MCNC: 4.1 G/DL (ref 1.9–3.5)
GLUCOSE SERPL-MCNC: 89 MG/DL (ref 65–99)
HCT VFR BLD AUTO: 45 % (ref 37–47)
HGB BLD-MCNC: 15.9 G/DL (ref 12–16)
IMM GRANULOCYTES # BLD AUTO: 0.01 K/UL (ref 0–0.11)
IMM GRANULOCYTES NFR BLD AUTO: 0.2 % (ref 0–0.9)
LYMPHOCYTES # BLD AUTO: 1.8 K/UL (ref 1–4.8)
LYMPHOCYTES NFR BLD: 42.7 % (ref 22–41)
MCH RBC QN AUTO: 35.7 PG (ref 27–33)
MCHC RBC AUTO-ENTMCNC: 35.3 G/DL (ref 33.6–35)
MCV RBC AUTO: 100.9 FL (ref 81.4–97.8)
MONOCYTES # BLD AUTO: 0.38 K/UL (ref 0–0.85)
MONOCYTES NFR BLD AUTO: 9 % (ref 0–13.4)
NEUTROPHILS # BLD AUTO: 1.9 K/UL (ref 2–7.15)
NEUTROPHILS NFR BLD: 45 % (ref 44–72)
NRBC # BLD AUTO: 0 K/UL
NRBC BLD-RTO: 0 /100 WBC
PLATELET # BLD AUTO: 178 K/UL (ref 164–446)
PMV BLD AUTO: 9.8 FL (ref 9–12.9)
POTASSIUM SERPL-SCNC: 3.3 MMOL/L (ref 3.6–5.5)
PROT SERPL-MCNC: 7.6 G/DL (ref 6–8.2)
RBC # BLD AUTO: 4.46 M/UL (ref 4.2–5.4)
SODIUM SERPL-SCNC: 143 MMOL/L (ref 135–145)
TROPONIN T SERPL-MCNC: 12 NG/L (ref 6–19)
WBC # BLD AUTO: 4.2 K/UL (ref 4.8–10.8)

## 2021-09-20 PROCEDURE — 94760 N-INVAS EAR/PLS OXIMETRY 1: CPT

## 2021-09-20 PROCEDURE — 700102 HCHG RX REV CODE 250 W/ 637 OVERRIDE(OP): Performed by: EMERGENCY MEDICINE

## 2021-09-20 PROCEDURE — 99285 EMERGENCY DEPT VISIT HI MDM: CPT

## 2021-09-20 PROCEDURE — 96375 TX/PRO/DX INJ NEW DRUG ADDON: CPT

## 2021-09-20 PROCEDURE — A9270 NON-COVERED ITEM OR SERVICE: HCPCS | Performed by: EMERGENCY MEDICINE

## 2021-09-20 PROCEDURE — 700105 HCHG RX REV CODE 258: Performed by: EMERGENCY MEDICINE

## 2021-09-20 PROCEDURE — 96376 TX/PRO/DX INJ SAME DRUG ADON: CPT

## 2021-09-20 PROCEDURE — 700111 HCHG RX REV CODE 636 W/ 250 OVERRIDE (IP): Performed by: EMERGENCY MEDICINE

## 2021-09-20 PROCEDURE — 80053 COMPREHEN METABOLIC PANEL: CPT

## 2021-09-20 PROCEDURE — 85025 COMPLETE CBC W/AUTO DIFF WBC: CPT

## 2021-09-20 PROCEDURE — 96374 THER/PROPH/DIAG INJ IV PUSH: CPT

## 2021-09-20 PROCEDURE — 36415 COLL VENOUS BLD VENIPUNCTURE: CPT

## 2021-09-20 PROCEDURE — 84484 ASSAY OF TROPONIN QUANT: CPT

## 2021-09-20 PROCEDURE — 93005 ELECTROCARDIOGRAM TRACING: CPT | Performed by: EMERGENCY MEDICINE

## 2021-09-20 PROCEDURE — 71045 X-RAY EXAM CHEST 1 VIEW: CPT

## 2021-09-20 RX ORDER — DIPHENHYDRAMINE HYDROCHLORIDE 50 MG/ML
50 INJECTION INTRAMUSCULAR; INTRAVENOUS ONCE
Status: DISCONTINUED | OUTPATIENT
Start: 2021-09-20 | End: 2021-09-21

## 2021-09-20 RX ORDER — LORAZEPAM 2 MG/ML
1 INJECTION INTRAMUSCULAR ONCE
Status: COMPLETED | OUTPATIENT
Start: 2021-09-20 | End: 2021-09-20

## 2021-09-20 RX ORDER — SODIUM CHLORIDE 9 MG/ML
1000 INJECTION, SOLUTION INTRAVENOUS ONCE
Status: COMPLETED | OUTPATIENT
Start: 2021-09-20 | End: 2021-09-20

## 2021-09-20 RX ORDER — METHADONE HYDROCHLORIDE 10 MG/1
10 TABLET ORAL ONCE
Status: COMPLETED | OUTPATIENT
Start: 2021-09-20 | End: 2021-09-20

## 2021-09-20 RX ORDER — METHYLPREDNISOLONE SODIUM SUCCINATE 125 MG/2ML
125 INJECTION, POWDER, LYOPHILIZED, FOR SOLUTION INTRAMUSCULAR; INTRAVENOUS ONCE
Status: COMPLETED | OUTPATIENT
Start: 2021-09-20 | End: 2021-09-20

## 2021-09-20 RX ADMIN — SODIUM CHLORIDE 1000 ML: 9 INJECTION, SOLUTION INTRAVENOUS at 18:53

## 2021-09-20 RX ADMIN — METHADONE HYDROCHLORIDE 10 MG: 10 TABLET ORAL at 18:53

## 2021-09-20 RX ADMIN — LORAZEPAM 1 MG: 2 INJECTION INTRAMUSCULAR; INTRAVENOUS at 21:49

## 2021-09-20 RX ADMIN — METHYLPREDNISOLONE SODIUM SUCCINATE 125 MG: 125 INJECTION, POWDER, FOR SOLUTION INTRAMUSCULAR; INTRAVENOUS at 21:18

## 2021-09-20 ASSESSMENT — LIFESTYLE VARIABLES: DO YOU DRINK ALCOHOL: NO

## 2021-09-20 ASSESSMENT — FIBROSIS 4 INDEX: FIB4 SCORE: 1.28

## 2021-09-21 ENCOUNTER — HOSPITAL ENCOUNTER (OUTPATIENT)
Dept: RADIOLOGY | Facility: MEDICAL CENTER | Age: 61
End: 2021-09-21
Attending: EMERGENCY MEDICINE
Payer: MEDICARE

## 2021-09-21 VITALS
TEMPERATURE: 98.2 F | SYSTOLIC BLOOD PRESSURE: 147 MMHG | BODY MASS INDEX: 23.55 KG/M2 | DIASTOLIC BLOOD PRESSURE: 100 MMHG | RESPIRATION RATE: 24 BRPM | HEIGHT: 62 IN | HEART RATE: 94 BPM | OXYGEN SATURATION: 94 % | WEIGHT: 128 LBS

## 2021-09-21 PROCEDURE — 71260 CT THORAX DX C+: CPT | Mod: ME

## 2021-09-21 PROCEDURE — 700117 HCHG RX CONTRAST REV CODE 255: Performed by: EMERGENCY MEDICINE

## 2021-09-21 PROCEDURE — 700111 HCHG RX REV CODE 636 W/ 250 OVERRIDE (IP): Performed by: EMERGENCY MEDICINE

## 2021-09-21 RX ORDER — METHYLPREDNISOLONE SODIUM SUCCINATE 125 MG/2ML
125 INJECTION, POWDER, LYOPHILIZED, FOR SOLUTION INTRAMUSCULAR; INTRAVENOUS ONCE
Status: COMPLETED | OUTPATIENT
Start: 2021-09-21 | End: 2021-09-21

## 2021-09-21 RX ORDER — LORAZEPAM 1 MG/1
1 TABLET ORAL EVERY 8 HOURS PRN
Qty: 25 TABLET | Refills: 0 | Status: SHIPPED | OUTPATIENT
Start: 2021-09-21 | End: 2021-10-01

## 2021-09-21 RX ORDER — DIPHENHYDRAMINE HYDROCHLORIDE 50 MG/ML
50 INJECTION INTRAMUSCULAR; INTRAVENOUS ONCE
Status: DISCONTINUED | OUTPATIENT
Start: 2021-09-21 | End: 2021-09-21 | Stop reason: HOSPADM

## 2021-09-21 RX ORDER — LORAZEPAM 1 MG/1
1 TABLET ORAL EVERY 8 HOURS PRN
Qty: 25 TABLET | Refills: 0 | Status: SHIPPED | OUTPATIENT
Start: 2021-09-21 | End: 2021-09-21 | Stop reason: SDUPTHER

## 2021-09-21 RX ADMIN — METHYLPREDNISOLONE SODIUM SUCCINATE 125 MG: 125 INJECTION, POWDER, FOR SOLUTION INTRAMUSCULAR; INTRAVENOUS at 00:44

## 2021-09-21 RX ADMIN — IOHEXOL 50 ML: 350 INJECTION, SOLUTION INTRAVENOUS at 01:16

## 2021-09-21 RX ADMIN — DIPHENHYDRAMINE HYDROCHLORIDE 50 MG: 50 INJECTION, SOLUTION INTRAMUSCULAR; INTRAVENOUS at 00:43

## 2021-09-21 NOTE — DISCHARGE INSTRUCTIONS
Continue using your oxygen at 2.5 L  Follow-up with your primary care provider for further treatment and your chronic pain medications.  If you feel you need to go back on the methadone for any reason you will have to follow-up with the methadone clinic as we are unable to prescribe that for you.  I am giving you medication for anxiety which will help with your breathing.  Return if uncontrolled fever or worsening breathing.

## 2021-09-21 NOTE — ED TRIAGE NOTES
Pt arrives to ED from home c/o CP and SOB, is COVID +, was initially 88% but was not wearing 3L prescribed O2. Improved to 95% after O2.

## 2021-09-21 NOTE — ED PROVIDER NOTES
ED Provider Note     Scribed for Domonique Torres D.O. by Alem Mcdaniel. 9/20/2021, 6:28 PM.     Primary care provider: RUDDY Avitia  Means of arrival: EMS         History obtained from: Patient  History limited by: None    CHIEF COMPLAINT  Chief Complaint   Patient presents with   • Chest Pain   • Shortness of Breath       HPI  Sanam Andre is a 61 y.o. female who presents to the emergency Department via EMS for evaluation of acute chest pain and worsening shortness of breath onset prior to arrival. She had COVID and was hospitalized on 8/29. She was discharged on 9/4/21 with home oxygen and has been requiring 2.5L O2. Upon arrival she was increased to 3L O2 due to being hypoxic to 88%. No fevers, vomiting, or diarrhea.  Additionally she has been on 86 mg Methadone for the last year but has not been able to take it since she was discharged as she has not had transport to the clinic. She is concerned that she may be withdrawing from the methadone.  She is extremely anxious but has had no nausea, vomiting or tremulousness.  She has a history of rheumatoid arthritis and is not under the care of a rheumatologist because she states she has missed too many appointments and they would not see her anymore.  She had been on pain medications but eventually shifted over to methadone.  She has been on that for over a year now.    REVIEW OF SYSTEMS  Pertinent positives include chest pain and shortness of breath. Pertinent negatives include no fevers, vomiting, or diarrhea.   See HPI for further details. All other systems are negative.    PAST MEDICAL HISTORY  Past Medical History:   Diagnosis Date   • Arrhythmia     hx of left bundle branch block   • Hepatitis C    • Hypertension    • Pain     knees 7/10   • Rheumatoid arthritis(714.0)        FAMILY HISTORY  No family history pertinent.    SOCIAL HISTORY  Social History     Tobacco Use   • Smoking status: Light Tobacco Smoker   • Smokeless tobacco: Never Used   •  "Tobacco comment: 3/4 ppd for 34 years   Substance Use Topics   • Alcohol use: No     Comment: 1 per week   • Drug use: No      Social History     Substance and Sexual Activity   Drug Use No       SURGICAL HISTORY  Past Surgical History:   Procedure Laterality Date   • KNEE ARTHROPLASTY TOTAL  9/15/2010    Performed by [facility]   • GYN SURGERY  2000    tubal ligation   • OTHER  1976    bunionectomy       CURRENT MEDICATIONS  Current Outpatient Medications   Medication Instructions   • acetaminophen (TYLENOL) 650 mg, Oral, EVERY 6 HOURS PRN   • aspirin 81 mg, Oral, DAILY   • atorvastatin (LIPITOR) 40 mg, Oral, EVERY EVENING   • guaiFENesin ER (MUCINEX) 600 mg, Oral, EVERY 12 HOURS   • lisinopril (PRINIVIL) 2.5 mg, Oral, DAILY   • methadone (DOLOPHINE) 86 mg, Oral, DAILY, 86 mg VERIFIED WITH [facility] REAG[facility] GORDON 8/31       ALLERGIES  Allergies   Allergen Reactions   • Codeine Vomiting   • Iodine Anaphylaxis     Reaction took place during CT and IV dye injection       PHYSICAL EXAM  VITAL SIGNS: /83   Pulse (!) 104   Temp 36.7 °C (98 °F) (Temporal)   Resp 18   Ht 1.575 m (5' 2\")   Wt 58.1 kg (128 lb)   SpO2 95%   BMI 23.41 kg/m²     Constitutional: Very thin, much older appearing than stated age.  Moderate distress, very anxious.  HENT: Normocephalic, atraumatic. Bilateral external auditory canals normal without drainage or cerumen.  Oral mucosa moist.  Eyes: PERRL, EOMI   Cardiovascular: Tachycardic, and Regular rhythm. No murmur  Thorax & Lungs: Clear and equal breath sounds with good excursion.  Mild respiratory distress, no rhonchi, wheezing or rales.   Abdomen: Soft, thin, nontender, no rebound , guarding, palpable masses.   Skin: Warm, Dry, No erythema, No rashes.   Extremities: Peripheral pulses 4/4 No edema, No tenderness  Musculoskeletal: Normal range of motion in all major joints. No tenderness to palpation or major deformities noted.   Neurologic: " Alert & oriented x 3, Normal motor function, Normal sensory function, deep tendon reflexes +2/4 bilaterally  Psychiatric: Affect very anxious and agitated, crying 1 minute and talking normal the next.      DIAGNOSTICS/PROCEDURES    LABS  Results for orders placed or performed during the hospital encounter of 09/20/21   CBC with Differential   Result Value Ref Range    WBC 4.2 (L) 4.8 - 10.8 K/uL    RBC 4.46 4.20 - 5.40 M/uL    Hemoglobin 15.9 12.0 - 16.0 g/dL    Hematocrit 45.0 37.0 - 47.0 %    .9 (H) 81.4 - 97.8 fL    MCH 35.7 (H) 27.0 - 33.0 pg    MCHC 35.3 (H) 33.6 - 35.0 g/dL    RDW 46.9 35.9 - 50.0 fL    Platelet Count 178 164 - 446 K/uL    MPV 9.8 9.0 - 12.9 fL    Neutrophils-Polys 45.00 44.00 - 72.00 %    Lymphocytes 42.70 (H) 22.00 - 41.00 %    Monocytes 9.00 0.00 - 13.40 %    Eosinophils 2.60 0.00 - 6.90 %    Basophils 0.50 0.00 - 1.80 %    Immature Granulocytes 0.20 0.00 - 0.90 %    Nucleated RBC 0.00 /100 WBC    Neutrophils (Absolute) 1.90 (L) 2.00 - 7.15 K/uL    Lymphs (Absolute) 1.80 1.00 - 4.80 K/uL    Monos (Absolute) 0.38 0.00 - 0.85 K/uL    Eos (Absolute) 0.11 0.00 - 0.51 K/uL    Baso (Absolute) 0.02 0.00 - 0.12 K/uL    Immature Granulocytes (abs) 0.01 0.00 - 0.11 K/uL    NRBC (Absolute) 0.00 K/uL   Complete Metabolic Panel (CMP)   Result Value Ref Range    Sodium 143 135 - 145 mmol/L    Potassium 3.3 (L) 3.6 - 5.5 mmol/L    Chloride 108 96 - 112 mmol/L    Co2 21 20 - 33 mmol/L    Anion Gap 14.0 7.0 - 16.0    Glucose 89 65 - 99 mg/dL    Bun 5 (L) 8 - 22 mg/dL    Creatinine 0.68 0.50 - 1.40 mg/dL    Calcium 9.5 8.5 - 10.5 mg/dL    AST(SGOT) 24 12 - 45 U/L    ALT(SGPT) 13 2 - 50 U/L    Alkaline Phosphatase 106 (H) 30 - 99 U/L    Total Bilirubin 0.3 0.1 - 1.5 mg/dL    Albumin 3.5 3.2 - 4.9 g/dL    Total Protein 7.6 6.0 - 8.2 g/dL    Globulin 4.1 (H) 1.9 - 3.5 g/dL    A-G Ratio 0.9 g/dL   Troponin   Result Value Ref Range    Troponin T 12 6 - 19 ng/L   ESTIMATED GFR   Result Value Ref Range     GFR If African American >60 >60 mL/min/1.73 m 2    GFR If Non African American >60 >60 mL/min/1.73 m 2   EKG   Result Value Ref Range    Report       [facility].    Test Date:  2021  Pt Name:    EDGAR CLAROS                 Department: ER  MRN:        [id number]                      Room:       [facility]  Gender:     Female                       Technician: [id number]  :        1960                   Requested By:ER TRIAGE PROTOCOL  Order #:    [id number]                    Reading MD:    Measurements  Intervals                                Axis  Rate:       99                           P:          29  KY:         176                          QRS:        -69  QRSD:       157                          T:          86  QT:         393  QTc:        505    Interpretive Statements  Sinus rhythm  Left bundle branch block  Compared to ECG 2021 11:09:27  Intraventricular conduction delay no longer present  ST (T wave) deviation no longer present       Labs reviewed by me    EKG  12 lead EKG interpreted by myself, see above.    RADIOLOGY/PROCEDURES  CT-CHEST (THORAX) WITH   Final Result      1.  Ill-defined mild pulmonary opacities, right lung worse than left, probably related to the sequela of Covid pneumonia.   2.  Mildly enlarged mediastinal lymph nodes are probably reactive.   3.  No significant hilar mass/adenopathy      Fleischner Society pulmonary nodule recommendations:   Not Applicable         DX-CHEST-PORTABLE (1 VIEW)   Final Result      1.  Bilateral airspace opacities are significantly improved compared to prior.   2.  Stable mild cardiomegaly.   3.  Prominence of the right hilum. Lymphadenopathy or mass is not excluded. Further evaluation can be performed with CT chest.        Results and radiologist interpretation reviewed by me.     COURSE & MEDICAL DECISION MAKING  Pertinent Labs & Imaging studies reviewed. (See chart for details)    6:28 PM - Patient seen and  evaluated at bedside. Ordered for EKG, DX-chest, CBC with differential, CMP, and Troponin to evaluate. Patient will be treated with IV fluids and methadone 10 mg for her symptoms. Differential diagnoses include, but are not limited to, COVID pneumonia, dehydration, coronary artery disease    8:24 PM - Patient was reevaluated at bedside. Discussed lab and radiology results with the patient and informed them that she needs a contrasted CT secondary to some mediastinal abnormalities in the right hilum revealing possible malignancy. Discussed her iodine allergy. Patient treated with Benadryl and Solu-Medrol.  Per radiologic protocol we have waited 4 hours for her to be appropriately premedicated for the CT.  I have treated her with methadone which she states did not help but I did give her some Ativan and she was resting more comfortably.    HYDRATION: Based on the patient's presentation of Dehydration the patient was given IV fluids. IV Hydration was used because oral hydration was not adequate alone. Upon recheck following hydration, the patient was improved.  Patient CT came back showing similar abnormalities per chest x-ray secondary to sequela from COVID-19.  She has no hilar mass, just lymphadenopathy and opacities worse on the right than the left which was present on her previous x-rays.  She has remained stable vital sign wise, she is saturating at 96% on her normal 2-1/2 L.  I will give her a prescription for Ativan to take every 8 hours as needed for her anxiety.  She is to follow-up with her primary care doctor for further treatment and pain management referral should she need it or she can follow-up with the methadone clinic if she feels she needs to go back on that.  At this time I do not feel that she needs to be back on any narcotic pain medication and she has had sufficient time to withdraw since her last dose was on her last hospital admission over 3 weeks ago.  She will be discharged in stable  condition.    FINAL IMPRESSION  1. Chest pain on breathing    2. SOB (shortness of breath)    3. Post-acute sequelae of COVID-19 (PASC)    4. Methadone withdrawal without complication (HCC)    5. Anxiety         IAlem (Scribe), am scribing for, and in the presence of, Domonique Torres D.O..    Electronically signed by: Alem Mcdaniel (Scribe), 9/20/2021    IDomonique D.O. personally performed the services described in this documentation, as scribed by Alem Mcdaniel in my presence, and it is both accurate and complete.    The note accurately reflects work and decisions made by me.  Domonique Torres D.O.  9/21/2021  2:02 AM

## 2021-11-01 ENCOUNTER — APPOINTMENT (OUTPATIENT)
Dept: RADIOLOGY | Facility: MEDICAL CENTER | Age: 61
DRG: 481 | End: 2021-11-01
Attending: EMERGENCY MEDICINE
Payer: MEDICARE

## 2021-11-01 ENCOUNTER — HOSPITAL ENCOUNTER (INPATIENT)
Facility: MEDICAL CENTER | Age: 61
LOS: 7 days | DRG: 481 | End: 2021-11-08
Attending: EMERGENCY MEDICINE | Admitting: HOSPITALIST
Payer: MEDICARE

## 2021-11-01 DIAGNOSIS — S72.001E TYPE I OR II OPEN FRACTURE OF NECK OF RIGHT FEMUR WITH ROUTINE HEALING, SUBSEQUENT ENCOUNTER: ICD-10-CM

## 2021-11-01 DIAGNOSIS — F11.20 METHADONE DEPENDENCE (HCC): ICD-10-CM

## 2021-11-01 DIAGNOSIS — R55 SYNCOPE, UNSPECIFIED SYNCOPE TYPE: ICD-10-CM

## 2021-11-01 DIAGNOSIS — I50.22 CHRONIC SYSTOLIC HEART FAILURE (HCC): ICD-10-CM

## 2021-11-01 PROBLEM — S72.90XA FEMORAL FRACTURE (HCC): Status: ACTIVE | Noted: 2021-11-01

## 2021-11-01 PROBLEM — E78.5 DYSLIPIDEMIA: Status: ACTIVE | Noted: 2021-11-01

## 2021-11-01 PROBLEM — N17.9 AKI (ACUTE KIDNEY INJURY) (HCC): Status: ACTIVE | Noted: 2021-11-01

## 2021-11-01 PROBLEM — I10 PRIMARY HYPERTENSION: Status: ACTIVE | Noted: 2021-11-01

## 2021-11-01 LAB
ALBUMIN SERPL BCP-MCNC: 3.8 G/DL (ref 3.2–4.9)
ALBUMIN/GLOB SERPL: 1.1 G/DL
ALP SERPL-CCNC: 95 U/L (ref 30–99)
ALT SERPL-CCNC: 43 U/L (ref 2–50)
ANION GAP SERPL CALC-SCNC: 17 MMOL/L (ref 7–16)
AST SERPL-CCNC: 31 U/L (ref 12–45)
BASOPHILS # BLD AUTO: 0.2 % (ref 0–1.8)
BASOPHILS # BLD: 0.02 K/UL (ref 0–0.12)
BILIRUB SERPL-MCNC: 0.4 MG/DL (ref 0.1–1.5)
BUN SERPL-MCNC: 33 MG/DL (ref 8–22)
CALCIUM SERPL-MCNC: 10.2 MG/DL (ref 8.4–10.2)
CHLORIDE SERPL-SCNC: 100 MMOL/L (ref 96–112)
CK SERPL-CCNC: 39 U/L (ref 0–154)
CO2 SERPL-SCNC: 21 MMOL/L (ref 20–33)
CREAT SERPL-MCNC: 1.38 MG/DL (ref 0.5–1.4)
EKG IMPRESSION: NORMAL
EOSINOPHIL # BLD AUTO: 0.12 K/UL (ref 0–0.51)
EOSINOPHIL NFR BLD: 1.5 % (ref 0–6.9)
ERYTHROCYTE [DISTWIDTH] IN BLOOD BY AUTOMATED COUNT: 46.7 FL (ref 35.9–50)
GLOBULIN SER CALC-MCNC: 3.5 G/DL (ref 1.9–3.5)
GLUCOSE SERPL-MCNC: 114 MG/DL (ref 65–99)
HCT VFR BLD AUTO: 39.2 % (ref 37–47)
HGB BLD-MCNC: 13.2 G/DL (ref 12–16)
IMM GRANULOCYTES # BLD AUTO: 0.04 K/UL (ref 0–0.11)
IMM GRANULOCYTES NFR BLD AUTO: 0.5 % (ref 0–0.9)
LYMPHOCYTES # BLD AUTO: 1.93 K/UL (ref 1–4.8)
LYMPHOCYTES NFR BLD: 23.3 % (ref 22–41)
MCH RBC QN AUTO: 34 PG (ref 27–33)
MCHC RBC AUTO-ENTMCNC: 33.7 G/DL (ref 33.6–35)
MCV RBC AUTO: 101 FL (ref 81.4–97.8)
MONOCYTES # BLD AUTO: 0.6 K/UL (ref 0–0.85)
MONOCYTES NFR BLD AUTO: 7.3 % (ref 0–13.4)
NEUTROPHILS # BLD AUTO: 5.56 K/UL (ref 2–7.15)
NEUTROPHILS NFR BLD: 67.2 % (ref 44–72)
NRBC # BLD AUTO: 0 K/UL
NRBC BLD-RTO: 0 /100 WBC
PLATELET # BLD AUTO: 194 K/UL (ref 164–446)
PMV BLD AUTO: 9.6 FL (ref 9–12.9)
POTASSIUM SERPL-SCNC: 3.5 MMOL/L (ref 3.6–5.5)
PROT SERPL-MCNC: 7.3 G/DL (ref 6–8.2)
RBC # BLD AUTO: 3.88 M/UL (ref 4.2–5.4)
SARS-COV+SARS-COV-2 AG RESP QL IA.RAPID: NOTDETECTED
SODIUM SERPL-SCNC: 138 MMOL/L (ref 135–145)
SPECIMEN SOURCE: NORMAL
TROPONIN T SERPL-MCNC: 18 NG/L (ref 6–19)
WBC # BLD AUTO: 8.3 K/UL (ref 4.8–10.8)

## 2021-11-01 PROCEDURE — 99285 EMERGENCY DEPT VISIT HI MDM: CPT

## 2021-11-01 PROCEDURE — 770020 HCHG ROOM/CARE - TELE (206)

## 2021-11-01 PROCEDURE — 93005 ELECTROCARDIOGRAM TRACING: CPT | Performed by: EMERGENCY MEDICINE

## 2021-11-01 PROCEDURE — 71045 X-RAY EXAM CHEST 1 VIEW: CPT

## 2021-11-01 PROCEDURE — 700111 HCHG RX REV CODE 636 W/ 250 OVERRIDE (IP): Performed by: EMERGENCY MEDICINE

## 2021-11-01 PROCEDURE — 93005 ELECTROCARDIOGRAM TRACING: CPT

## 2021-11-01 PROCEDURE — 700102 HCHG RX REV CODE 250 W/ 637 OVERRIDE(OP): Performed by: HOSPITALIST

## 2021-11-01 PROCEDURE — 99223 1ST HOSP IP/OBS HIGH 75: CPT | Mod: AI | Performed by: HOSPITALIST

## 2021-11-01 PROCEDURE — 87426 SARSCOV CORONAVIRUS AG IA: CPT

## 2021-11-01 PROCEDURE — 90715 TDAP VACCINE 7 YRS/> IM: CPT | Performed by: EMERGENCY MEDICINE

## 2021-11-01 PROCEDURE — 3E0234Z INTRODUCTION OF SERUM, TOXOID AND VACCINE INTO MUSCLE, PERCUTANEOUS APPROACH: ICD-10-PCS | Performed by: HOSPITALIST

## 2021-11-01 PROCEDURE — 73560 X-RAY EXAM OF KNEE 1 OR 2: CPT | Mod: RT

## 2021-11-01 PROCEDURE — 700101 HCHG RX REV CODE 250: Performed by: HOSPITALIST

## 2021-11-01 PROCEDURE — 700105 HCHG RX REV CODE 258: Performed by: EMERGENCY MEDICINE

## 2021-11-01 PROCEDURE — 82550 ASSAY OF CK (CPK): CPT

## 2021-11-01 PROCEDURE — 700111 HCHG RX REV CODE 636 W/ 250 OVERRIDE (IP): Performed by: HOSPITALIST

## 2021-11-01 PROCEDURE — 84484 ASSAY OF TROPONIN QUANT: CPT

## 2021-11-01 PROCEDURE — 96375 TX/PRO/DX INJ NEW DRUG ADDON: CPT

## 2021-11-01 PROCEDURE — 90471 IMMUNIZATION ADMIN: CPT

## 2021-11-01 PROCEDURE — 36415 COLL VENOUS BLD VENIPUNCTURE: CPT

## 2021-11-01 PROCEDURE — 70450 CT HEAD/BRAIN W/O DYE: CPT | Mod: ME

## 2021-11-01 PROCEDURE — 85025 COMPLETE CBC W/AUTO DIFF WBC: CPT

## 2021-11-01 PROCEDURE — A9270 NON-COVERED ITEM OR SERVICE: HCPCS | Performed by: HOSPITALIST

## 2021-11-01 PROCEDURE — 73501 X-RAY EXAM HIP UNI 1 VIEW: CPT | Mod: RT

## 2021-11-01 PROCEDURE — 80053 COMPREHEN METABOLIC PANEL: CPT

## 2021-11-01 PROCEDURE — 96374 THER/PROPH/DIAG INJ IV PUSH: CPT

## 2021-11-01 RX ORDER — ATORVASTATIN CALCIUM 40 MG/1
40 TABLET, FILM COATED ORAL EVERY EVENING
Status: DISCONTINUED | OUTPATIENT
Start: 2021-11-01 | End: 2021-11-08 | Stop reason: HOSPADM

## 2021-11-01 RX ORDER — BISACODYL 10 MG
10 SUPPOSITORY, RECTAL RECTAL
Status: DISCONTINUED | OUTPATIENT
Start: 2021-11-01 | End: 2021-11-03

## 2021-11-01 RX ORDER — IBUPROFEN 800 MG/1
800 TABLET ORAL 2 TIMES DAILY
Status: ON HOLD | COMMUNITY
End: 2021-11-06

## 2021-11-01 RX ORDER — PROCHLORPERAZINE EDISYLATE 5 MG/ML
5-10 INJECTION INTRAMUSCULAR; INTRAVENOUS EVERY 4 HOURS PRN
Status: DISCONTINUED | OUTPATIENT
Start: 2021-11-01 | End: 2021-11-08 | Stop reason: HOSPADM

## 2021-11-01 RX ORDER — GABAPENTIN 300 MG/1
300 CAPSULE ORAL 2 TIMES DAILY
Status: DISCONTINUED | OUTPATIENT
Start: 2021-11-02 | End: 2021-11-08 | Stop reason: HOSPADM

## 2021-11-01 RX ORDER — MORPHINE SULFATE 15 MG/1
7.5 TABLET ORAL EVERY 6 HOURS PRN
Status: DISCONTINUED | OUTPATIENT
Start: 2021-11-01 | End: 2021-11-08 | Stop reason: HOSPADM

## 2021-11-01 RX ORDER — OXYCODONE HYDROCHLORIDE 5 MG/1
5 TABLET ORAL
Status: DISCONTINUED | OUTPATIENT
Start: 2021-11-01 | End: 2021-11-01

## 2021-11-01 RX ORDER — SODIUM CHLORIDE AND POTASSIUM CHLORIDE 150; 900 MG/100ML; MG/100ML
INJECTION, SOLUTION INTRAVENOUS CONTINUOUS
Status: DISCONTINUED | OUTPATIENT
Start: 2021-11-01 | End: 2021-11-04

## 2021-11-01 RX ORDER — GABAPENTIN 300 MG/1
300-600 CAPSULE ORAL 3 TIMES DAILY
COMMUNITY
End: 2023-02-27

## 2021-11-01 RX ORDER — GABAPENTIN 300 MG/1
600 CAPSULE ORAL NIGHTLY
Status: DISCONTINUED | OUTPATIENT
Start: 2021-11-01 | End: 2021-11-08 | Stop reason: HOSPADM

## 2021-11-01 RX ORDER — LISINOPRIL AND HYDROCHLOROTHIAZIDE 25; 20 MG/1; MG/1
1 TABLET ORAL DAILY
Status: ON HOLD | COMMUNITY
End: 2021-11-06

## 2021-11-01 RX ORDER — MORPHINE SULFATE 4 MG/ML
2-4 INJECTION, SOLUTION INTRAMUSCULAR; INTRAVENOUS
Status: DISCONTINUED | OUTPATIENT
Start: 2021-11-01 | End: 2021-11-03

## 2021-11-01 RX ORDER — LISINOPRIL 5 MG/1
2.5 TABLET ORAL DAILY
Status: DISCONTINUED | OUTPATIENT
Start: 2021-11-02 | End: 2021-11-01

## 2021-11-01 RX ORDER — OXYCODONE HYDROCHLORIDE 5 MG/1
2.5 TABLET ORAL
Status: DISCONTINUED | OUTPATIENT
Start: 2021-11-01 | End: 2021-11-01

## 2021-11-01 RX ORDER — DISULFIRAM 250 MG/1
250 TABLET ORAL DAILY
Status: ON HOLD | COMMUNITY
End: 2021-11-06

## 2021-11-01 RX ORDER — POLYETHYLENE GLYCOL 3350 17 G/17G
1 POWDER, FOR SOLUTION ORAL
Status: DISCONTINUED | OUTPATIENT
Start: 2021-11-01 | End: 2021-11-03

## 2021-11-01 RX ORDER — ACETAMINOPHEN 325 MG/1
650 TABLET ORAL EVERY 6 HOURS PRN
Status: DISCONTINUED | OUTPATIENT
Start: 2021-11-01 | End: 2021-11-03

## 2021-11-01 RX ORDER — ONDANSETRON 2 MG/ML
4 INJECTION INTRAMUSCULAR; INTRAVENOUS EVERY 4 HOURS PRN
Status: DISCONTINUED | OUTPATIENT
Start: 2021-11-01 | End: 2021-11-03

## 2021-11-01 RX ORDER — SODIUM CHLORIDE 9 MG/ML
1000 INJECTION, SOLUTION INTRAVENOUS ONCE
Status: COMPLETED | OUTPATIENT
Start: 2021-11-01 | End: 2021-11-01

## 2021-11-01 RX ORDER — PROMETHAZINE HYDROCHLORIDE 25 MG/1
12.5-25 SUPPOSITORY RECTAL EVERY 4 HOURS PRN
Status: DISCONTINUED | OUTPATIENT
Start: 2021-11-01 | End: 2021-11-08 | Stop reason: HOSPADM

## 2021-11-01 RX ORDER — LISINOPRIL 5 MG/1
5 TABLET ORAL
Status: DISCONTINUED | OUTPATIENT
Start: 2021-11-02 | End: 2021-11-08 | Stop reason: HOSPADM

## 2021-11-01 RX ORDER — MORPHINE SULFATE 4 MG/ML
4 INJECTION, SOLUTION INTRAMUSCULAR; INTRAVENOUS ONCE
Status: COMPLETED | OUTPATIENT
Start: 2021-11-01 | End: 2021-11-01

## 2021-11-01 RX ORDER — METHADONE HYDROCHLORIDE 10 MG/ML
86 CONCENTRATE ORAL DAILY
Status: DISCONTINUED | OUTPATIENT
Start: 2021-11-02 | End: 2021-11-01

## 2021-11-01 RX ORDER — KETOROLAC TROMETHAMINE 30 MG/ML
15 INJECTION, SOLUTION INTRAMUSCULAR; INTRAVENOUS EVERY 8 HOURS PRN
Status: DISPENSED | OUTPATIENT
Start: 2021-11-01 | End: 2021-11-02

## 2021-11-01 RX ORDER — OXYCODONE HYDROCHLORIDE 10 MG/1
10 TABLET ORAL 2 TIMES DAILY PRN
Status: ON HOLD | COMMUNITY
End: 2021-11-06 | Stop reason: SDUPTHER

## 2021-11-01 RX ORDER — HYDROMORPHONE HYDROCHLORIDE 1 MG/ML
0.25 INJECTION, SOLUTION INTRAMUSCULAR; INTRAVENOUS; SUBCUTANEOUS
Status: DISCONTINUED | OUTPATIENT
Start: 2021-11-01 | End: 2021-11-01

## 2021-11-01 RX ORDER — AMOXICILLIN 250 MG
2 CAPSULE ORAL 2 TIMES DAILY
Status: DISCONTINUED | OUTPATIENT
Start: 2021-11-02 | End: 2021-11-03

## 2021-11-01 RX ORDER — GABAPENTIN 300 MG/1
300 CAPSULE ORAL 2 TIMES DAILY
Status: DISCONTINUED | OUTPATIENT
Start: 2021-11-01 | End: 2021-11-01

## 2021-11-01 RX ORDER — ONDANSETRON 4 MG/1
4 TABLET, ORALLY DISINTEGRATING ORAL EVERY 4 HOURS PRN
Status: DISCONTINUED | OUTPATIENT
Start: 2021-11-01 | End: 2021-11-08 | Stop reason: HOSPADM

## 2021-11-01 RX ORDER — OXYCODONE HYDROCHLORIDE 10 MG/1
10 TABLET ORAL 2 TIMES DAILY PRN
Status: DISCONTINUED | OUTPATIENT
Start: 2021-11-01 | End: 2021-11-03

## 2021-11-01 RX ORDER — PROMETHAZINE HYDROCHLORIDE 25 MG/1
12.5-25 TABLET ORAL EVERY 4 HOURS PRN
Status: DISCONTINUED | OUTPATIENT
Start: 2021-11-01 | End: 2021-11-08 | Stop reason: HOSPADM

## 2021-11-01 RX ADMIN — MORPHINE SULFATE 4 MG: 4 INJECTION INTRAVENOUS at 17:20

## 2021-11-01 RX ADMIN — MORPHINE SULFATE 4 MG: 4 INJECTION INTRAVENOUS at 22:58

## 2021-11-01 RX ADMIN — MORPHINE SULFATE 7.5 MG: 15 TABLET ORAL at 21:54

## 2021-11-01 RX ADMIN — CLOSTRIDIUM TETANI TOXOID ANTIGEN (FORMALDEHYDE INACTIVATED), CORYNEBACTERIUM DIPHTHERIAE TOXOID ANTIGEN (FORMALDEHYDE INACTIVATED), BORDETELLA PERTUSSIS TOXOID ANTIGEN (GLUTARALDEHYDE INACTIVATED), BORDETELLA PERTUSSIS FILAMENTOUS HEMAGGLUTININ ANTIGEN (FORMALDEHYDE INACTIVATED), BORDETELLA PERTUSSIS PERTACTIN ANTIGEN, AND BORDETELLA PERTUSSIS FIMBRIAE 2/3 ANTIGEN 0.5 ML: 5; 2; 2.5; 5; 3; 5 INJECTION, SUSPENSION INTRAMUSCULAR at 17:20

## 2021-11-01 RX ADMIN — ATORVASTATIN CALCIUM 40 MG: 40 TABLET, FILM COATED ORAL at 18:48

## 2021-11-01 RX ADMIN — SODIUM CHLORIDE 1000 ML: 9 INJECTION, SOLUTION INTRAVENOUS at 17:21

## 2021-11-01 RX ADMIN — FENTANYL CITRATE 50 MCG: 50 INJECTION, SOLUTION INTRAMUSCULAR; INTRAVENOUS at 15:31

## 2021-11-01 RX ADMIN — OXYCODONE HYDROCHLORIDE 10 MG: 10 TABLET ORAL at 18:48

## 2021-11-01 RX ADMIN — POTASSIUM CHLORIDE AND SODIUM CHLORIDE 1000 ML: 900; 150 INJECTION, SOLUTION INTRAVENOUS at 19:12

## 2021-11-01 RX ADMIN — OXYCODONE HYDROCHLORIDE 10 MG: 10 TABLET ORAL at 23:19

## 2021-11-01 RX ADMIN — GABAPENTIN 600 MG: 300 CAPSULE ORAL at 21:54

## 2021-11-01 ASSESSMENT — ENCOUNTER SYMPTOMS
CHILLS: 0
EYE DISCHARGE: 0
STRIDOR: 0
SHORTNESS OF BREATH: 0
VOMITING: 0
LOSS OF CONSCIOUSNESS: 1
COUGH: 0
ABDOMINAL PAIN: 0
MYALGIAS: 0
NERVOUS/ANXIOUS: 0
FEVER: 0
BRUISES/BLEEDS EASILY: 0
FOCAL WEAKNESS: 0
EYE REDNESS: 0
FLANK PAIN: 0

## 2021-11-01 ASSESSMENT — COGNITIVE AND FUNCTIONAL STATUS - GENERAL
PERSONAL GROOMING: A LITTLE
DAILY ACTIVITIY SCORE: 19
MOBILITY SCORE: 9
HELP NEEDED FOR BATHING: A LITTLE
TURNING FROM BACK TO SIDE WHILE IN FLAT BAD: A LOT
MOVING TO AND FROM BED TO CHAIR: A LOT
SUGGESTED CMS G CODE MODIFIER MOBILITY: CM
DRESSING REGULAR LOWER BODY CLOTHING: A LOT
SUGGESTED CMS G CODE MODIFIER DAILY ACTIVITY: CK
STANDING UP FROM CHAIR USING ARMS: A LOT
MOVING FROM LYING ON BACK TO SITTING ON SIDE OF FLAT BED: UNABLE
TOILETING: A LITTLE
WALKING IN HOSPITAL ROOM: TOTAL
CLIMB 3 TO 5 STEPS WITH RAILING: TOTAL

## 2021-11-01 ASSESSMENT — PAIN DESCRIPTION - PAIN TYPE
TYPE: ACUTE PAIN

## 2021-11-01 ASSESSMENT — FIBROSIS 4 INDEX: FIB4 SCORE: 2.32

## 2021-11-01 NOTE — ED NOTES
"Pt laying in glenna, AO4, c/o pain to right knee \"I think I messed it up when I fell this morning, it hurts 9/10 when I move it.\" Pt given prn fentanyl as ordered.   "

## 2021-11-01 NOTE — ED PROVIDER NOTES
"ED Provider Note    CHIEF COMPLAINT  Chief Complaint   Patient presents with   • Syncope     Pt. reports syncope this AM, \"my vision went and I fell\". Reports she stayed on the ground for approx 9h- \"My daughter heard me go down but I didn't want her to move me\".    • Knee Pain     R knee- +hx arthritis, pain exacerbated by fall. ROM limited d/t pain, circulation and sensation intact.    • Laceration     Small lac to R low eyebrow- bleeding controlled.       HPI  Sanam Andre is a 61 y.o. female who presents with right knee and right hip pain.  The patient states around 5:00 this morning she woke up on the couch and went into the kitchen to get something when she states it felt like her vision went out and she passed out.  She found herself on the ground with significant pain to the right hip and right knee.  She cannot get up due to the pain and was down for approximately 9 hours.  The patient presents complaint of right knee pain.  She states she still has a right temporal headache as well as some pain in the right orbit where she has a small laceration to the upper orbital region on the right.  She does not have any associated nausea or vomiting.  She did not have any associated chest pain, palpitations, nor difficulty with breathing prior to the event.  The patient does not take anticoagulants.  She does have some chronic pain from rheumatoid arthritis that is unchanged.  She states she has severe pain in the right knee and the right hip that is worse with any attempted movement at these joints.  She does not have any distal paresthesias nor functional loss to the right foot.    REVIEW OF SYSTEMS  See HPI for further details. All other systems are negative.     PAST MEDICAL HISTORY  Past Medical History:   Diagnosis Date   • Arrhythmia     hx of left bundle branch block   • Hepatitis C    • Hypertension    • Pain     knees 7/10   • Rheumatoid arthritis(714.0)        FAMILY HISTORY  @[email or web address]@    SOCIAL " HISTORY  Social History     Socioeconomic History   • Marital status:      Spouse name: Not on file   • Number of children: Not on file   • Years of education: Not on file   • Highest education level: Not on file   Occupational History   • Not on file   Tobacco Use   • Smoking status: Light Tobacco Smoker   • Smokeless tobacco: Never Used   • Tobacco comment: 3/4 ppd for 34 years   Substance and Sexual Activity   • Alcohol use: No     Comment: 1 per week   • Drug use: No   • Sexual activity: Not on file   Other Topics Concern   • Not on file   Social History Narrative   • Not on file     Social Determinants of Health     Financial Resource Strain:    • Difficulty of Paying Living Expenses:    Food Insecurity:    • Worried About Running Out of Food in the Last Year:    • Ran Out of Food in the Last Year:    Transportation Needs:    • Lack of Transportation (Medical):    • Lack of Transportation (Non-Medical):    Physical Activity:    • Days of Exercise per Week:    • Minutes of Exercise per Session:    Stress:    • Feeling of Stress :    Social Connections:    • Frequency of Communication with Friends and Family:    • Frequency of Social Gatherings with Friends and Family:    • Attends Rastafarian Services:    • Active Member of Clubs or Organizations:    • Attends Club or Organization Meetings:    • Marital Status:    Intimate Partner Violence:    • Fear of Current or Ex-Partner:    • Emotionally Abused:    • Physically Abused:    • Sexually Abused:        SURGICAL HISTORY  Past Surgical History:   Procedure Laterality Date   • KNEE ARTHROPLASTY TOTAL  9/15/2010    Performed by [facility] at [facility]S   • GYN SURGERY  2000    tubal ligation   • OTHER  1976    bunionectomy       CURRENT MEDICATIONS  Home Medications    **Home medications have not yet been reviewed for this encounter**         ALLERGIES  Allergies   Allergen Reactions   • Codeine Vomiting   • Iodine Anaphylaxis      "Reaction took place during CT and IV dye injection       PHYSICAL EXAM  VITAL SIGNS: BP (!) 97/77   Pulse 94   Temp 36.2 °C (97.1 °F) (Temporal)   Resp 18   Ht 1.549 m (5' 1\")   Wt 55.8 kg (123 lb)   SpO2 95%   BMI 23.24 kg/m²       Constitutional: Patient appears uncomfortable  HENT: Right periorbital ecchymosis, 1 cm laceration to the right superior orbit, Bilateral external ears normal, Oropharynx moist, No oral exudates, Nose normal.   Eyes: PERRLA, EOMI, Conjunctiva normal, No discharge.   Neck: Normal range of motion, No tenderness, Supple, No stridor.   Lymphatic: No lymphadenopathy noted.   Cardiovascular: Normal heart rate, Normal rhythm, No murmurs, No rubs, No gallops.   Thorax & Lungs: Normal breath sounds, No respiratory distress, No wheezing, No chest tenderness.   Abdomen: Bowel sounds normal, Soft, No tenderness, No masses, No pulsatile masses.   Skin: The small facial laceration described above  Back: No tenderness, No CVA tenderness.   Extremities: Diffuse discomfort throughout the right knee, no obvious deformities to the knee nor the ankle.  She also has pain in the right hip with internal and external rotation.  Extremities otherwise intact distal pulses, No edema, No tenderness, No cyanosis, No clubbing.   Neurologic: GCS of 15.   Psychiatric: Affect normal, Judgment normal, Mood normal.     Results for orders placed or performed during the hospital encounter of 11/01/21   CBC with Differential   Result Value Ref Range    WBC 8.3 4.8 - 10.8 K/uL    RBC 3.88 (L) 4.20 - 5.40 M/uL    Hemoglobin 13.2 12.0 - 16.0 g/dL    Hematocrit 39.2 37.0 - 47.0 %    .0 (H) 81.4 - 97.8 fL    MCH 34.0 (H) 27.0 - 33.0 pg    MCHC 33.7 33.6 - 35.0 g/dL    RDW 46.7 35.9 - 50.0 fL    Platelet Count 194 164 - 446 K/uL    MPV 9.6 9.0 - 12.9 fL    Neutrophils-Polys 67.20 44.00 - 72.00 %    Lymphocytes 23.30 22.00 - 41.00 %    Monocytes 7.30 0.00 - 13.40 %    Eosinophils 1.50 0.00 - 6.90 %    Basophils 0.20 " 0.00 - 1.80 %    Immature Granulocytes 0.50 0.00 - 0.90 %    Nucleated RBC 0.00 /100 WBC    Neutrophils (Absolute) 5.56 2.00 - 7.15 K/uL    Lymphs (Absolute) 1.93 1.00 - 4.80 K/uL    Monos (Absolute) 0.60 0.00 - 0.85 K/uL    Eos (Absolute) 0.12 0.00 - 0.51 K/uL    Baso (Absolute) 0.02 0.00 - 0.12 K/uL    Immature Granulocytes (abs) 0.04 0.00 - 0.11 K/uL    NRBC (Absolute) 0.00 K/uL   Complete Metabolic Panel (CMP)   Result Value Ref Range    Sodium 138 135 - 145 mmol/L    Potassium 3.5 (L) 3.6 - 5.5 mmol/L    Chloride 100 96 - 112 mmol/L    Co2 21 20 - 33 mmol/L    Anion Gap 17.0 (H) 7.0 - 16.0    Glucose 114 (H) 65 - 99 mg/dL    Bun 33 (H) 8 - 22 mg/dL    Creatinine 1.38 0.50 - 1.40 mg/dL    Calcium 10.2 8.4 - 10.2 mg/dL    AST(SGOT) 31 12 - 45 U/L    ALT(SGPT) 43 2 - 50 U/L    Alkaline Phosphatase 95 30 - 99 U/L    Total Bilirubin 0.4 0.1 - 1.5 mg/dL    Albumin 3.8 3.2 - 4.9 g/dL    Total Protein 7.3 6.0 - 8.2 g/dL    Globulin 3.5 1.9 - 3.5 g/dL    A-G Ratio 1.1 g/dL   Troponin   Result Value Ref Range    Troponin T 18 6 - 19 ng/L   CREATINE KINASE   Result Value Ref Range    CPK Total 39 0 - 154 U/L   ESTIMATED GFR   Result Value Ref Range    GFR If  47 (A) >60 mL/min/1.73 m 2    GFR If Non  39 (A) >60 mL/min/1.73 m 2   EKG   Result Value Ref Range    Report       [facility] Emergency Dept.    Test Date:  2021  Pt Name:    EDGAR CLAROS                 Department: [facility]  MRN:        [id number]                      [facility]  Gender:     Female                       Technician: TIP  :        [id number]                   Requested By:ER TRIAGE PROTOCOL  Order #:    [id number]                    Reading MD: ZOE SORIANO MD    Measurements  Intervals                                Axis  Rate:       92                           P:          31  MN:         181                          QRS:        -61  QRSD:       153                           T:          102  QT:         387  QTc:        479    Interpretive Statements  Twelve-lead EKG shows a sinus rhythm with a ventricular to 92, poor R wave  progression, J-point elevation anteriorly, overall no acute changes from  prior  Electronically Signed On 11-1-2021 17:23:13 PDT by ZOE SORIANO MD           RADIOLOGY/PROCEDURES  DX-HIP-UNILATERAL-WITH PELVIS-1 VIEW RIGHT   Final Result      1.  No acute fracture is identified.      DX-KNEE 2- RIGHT   Final Result      Status post left total knee arthroplasty.      DX-CHEST-PORTABLE (1 VIEW)   Final Result      Medial right basilar opacity may represent early pneumonia or diaphragmatic prominence seen on recent CT. PA and lateral views of the chest may be helpful for further evaluation.      CT-HEAD W/O   Final Result      No acute intracranial abnormality.            COURSE & MEDICAL DECISION MAKING  Pertinent Labs & Imaging studies reviewed. (See chart for details)  Is a 61-year-old female who presents the emerge department after syncopal episode.  My suspicion is the syncope was from dehydration as she is slightly hypotensive on arrival and has a elevation in the BUN.  The patient did start receiving IV fluids for suspected dehydration.  From the fall itself she did strike her head and a CT scan was performed.  There is no evidence of intracranial trauma nor is any evidence of a stroke that could cause her presenting syncope.  Laboratory analysis otherwise does not show any evidence of a metabolic derangement that could cause cardiac irritability.  She has been in a sinus rhythm throughout her stay.  She also injured her right leg and right hip in the fall and she does have an acute fracture just proximal to the arthroplasty on the right.  The patient will therefore be admitted to the hospitalist for further work-up the syncope and orthopedics will be consulted for the fracture that I suspect will require surgical intervention.  The small laceration to  the face will heal via secondary intention.  The patient also has some hip pain on arrival and I suspect this is from a contusion as the x-ray does not show any evidence of a fracture.    FINAL IMPRESSION  1.  Syncope  2.  Facial contusion  3.  2 cm facial laceration  4.  Right distal femur fracture  5.  Dehydration  6.  Right hip contusion    Disposition  Patient will be admitted in stable condition         Electronically signed by: Gordy Velazquez M.D., 11/1/2021 3:12 PM

## 2021-11-02 ENCOUNTER — APPOINTMENT (OUTPATIENT)
Dept: CARDIOLOGY | Facility: MEDICAL CENTER | Age: 61
DRG: 481 | End: 2021-11-02
Attending: HOSPITALIST
Payer: MEDICARE

## 2021-11-02 LAB
ALBUMIN SERPL BCP-MCNC: 3 G/DL (ref 3.2–4.9)
ALBUMIN/GLOB SERPL: 1 G/DL
ALP SERPL-CCNC: 79 U/L (ref 30–99)
ALT SERPL-CCNC: 33 U/L (ref 2–50)
ANION GAP SERPL CALC-SCNC: 12 MMOL/L (ref 7–16)
AST SERPL-CCNC: 25 U/L (ref 12–45)
BASOPHILS # BLD AUTO: 0.7 % (ref 0–1.8)
BASOPHILS # BLD: 0.04 K/UL (ref 0–0.12)
BILIRUB SERPL-MCNC: 0.4 MG/DL (ref 0.1–1.5)
BUN SERPL-MCNC: 24 MG/DL (ref 8–22)
CALCIUM SERPL-MCNC: 9.4 MG/DL (ref 8.4–10.2)
CHLORIDE SERPL-SCNC: 108 MMOL/L (ref 96–112)
CO2 SERPL-SCNC: 21 MMOL/L (ref 20–33)
CREAT SERPL-MCNC: 0.95 MG/DL (ref 0.5–1.4)
EOSINOPHIL # BLD AUTO: 0.16 K/UL (ref 0–0.51)
EOSINOPHIL NFR BLD: 2.7 % (ref 0–6.9)
ERYTHROCYTE [DISTWIDTH] IN BLOOD BY AUTOMATED COUNT: 48 FL (ref 35.9–50)
GLOBULIN SER CALC-MCNC: 3 G/DL (ref 1.9–3.5)
GLUCOSE SERPL-MCNC: 124 MG/DL (ref 65–99)
HCT VFR BLD AUTO: 36.6 % (ref 37–47)
HGB BLD-MCNC: 11.7 G/DL (ref 12–16)
IMM GRANULOCYTES # BLD AUTO: 0.02 K/UL (ref 0–0.11)
IMM GRANULOCYTES NFR BLD AUTO: 0.3 % (ref 0–0.9)
LV EJECT FRACT  99904: 45
LYMPHOCYTES # BLD AUTO: 1.86 K/UL (ref 1–4.8)
LYMPHOCYTES NFR BLD: 31.6 % (ref 22–41)
MAGNESIUM SERPL-MCNC: 1.7 MG/DL (ref 1.5–2.5)
MCH RBC QN AUTO: 33.6 PG (ref 27–33)
MCHC RBC AUTO-ENTMCNC: 32 G/DL (ref 33.6–35)
MCV RBC AUTO: 105.2 FL (ref 81.4–97.8)
MONOCYTES # BLD AUTO: 0.47 K/UL (ref 0–0.85)
MONOCYTES NFR BLD AUTO: 8 % (ref 0–13.4)
NEUTROPHILS # BLD AUTO: 3.33 K/UL (ref 2–7.15)
NEUTROPHILS NFR BLD: 56.7 % (ref 44–72)
NRBC # BLD AUTO: 0 K/UL
NRBC BLD-RTO: 0 /100 WBC
PLATELET # BLD AUTO: 158 K/UL (ref 164–446)
PMV BLD AUTO: 10 FL (ref 9–12.9)
POTASSIUM SERPL-SCNC: 3.9 MMOL/L (ref 3.6–5.5)
PROT SERPL-MCNC: 6 G/DL (ref 6–8.2)
RBC # BLD AUTO: 3.48 M/UL (ref 4.2–5.4)
SODIUM SERPL-SCNC: 141 MMOL/L (ref 135–145)
WBC # BLD AUTO: 5.9 K/UL (ref 4.8–10.8)

## 2021-11-02 PROCEDURE — 83735 ASSAY OF MAGNESIUM: CPT

## 2021-11-02 PROCEDURE — 700101 HCHG RX REV CODE 250: Performed by: HOSPITALIST

## 2021-11-02 PROCEDURE — 700111 HCHG RX REV CODE 636 W/ 250 OVERRIDE (IP): Performed by: HOSPITALIST

## 2021-11-02 PROCEDURE — 96374 THER/PROPH/DIAG INJ IV PUSH: CPT

## 2021-11-02 PROCEDURE — 99233 SBSQ HOSP IP/OBS HIGH 50: CPT | Performed by: HOSPITALIST

## 2021-11-02 PROCEDURE — 700102 HCHG RX REV CODE 250 W/ 637 OVERRIDE(OP): Performed by: HOSPITALIST

## 2021-11-02 PROCEDURE — 770020 HCHG ROOM/CARE - TELE (206)

## 2021-11-02 PROCEDURE — 99407 BEHAV CHNG SMOKING > 10 MIN: CPT

## 2021-11-02 PROCEDURE — 93306 TTE W/DOPPLER COMPLETE: CPT

## 2021-11-02 PROCEDURE — 93306 TTE W/DOPPLER COMPLETE: CPT | Mod: 26 | Performed by: INTERNAL MEDICINE

## 2021-11-02 PROCEDURE — 36415 COLL VENOUS BLD VENIPUNCTURE: CPT

## 2021-11-02 PROCEDURE — 80053 COMPREHEN METABOLIC PANEL: CPT

## 2021-11-02 PROCEDURE — 96375 TX/PRO/DX INJ NEW DRUG ADDON: CPT

## 2021-11-02 PROCEDURE — 85025 COMPLETE CBC W/AUTO DIFF WBC: CPT

## 2021-11-02 PROCEDURE — A9270 NON-COVERED ITEM OR SERVICE: HCPCS | Performed by: HOSPITALIST

## 2021-11-02 RX ADMIN — MORPHINE SULFATE 7.5 MG: 15 TABLET ORAL at 22:42

## 2021-11-02 RX ADMIN — MORPHINE SULFATE 4 MG: 4 INJECTION INTRAVENOUS at 12:08

## 2021-11-02 RX ADMIN — ONDANSETRON 4 MG: 4 TABLET, ORALLY DISINTEGRATING ORAL at 21:40

## 2021-11-02 RX ADMIN — MORPHINE SULFATE 4 MG: 4 INJECTION INTRAVENOUS at 02:12

## 2021-11-02 RX ADMIN — GABAPENTIN 300 MG: 300 CAPSULE ORAL at 05:04

## 2021-11-02 RX ADMIN — MORPHINE SULFATE 4 MG: 4 INJECTION INTRAVENOUS at 08:51

## 2021-11-02 RX ADMIN — GABAPENTIN 300 MG: 300 CAPSULE ORAL at 12:07

## 2021-11-02 RX ADMIN — GABAPENTIN 600 MG: 300 CAPSULE ORAL at 20:29

## 2021-11-02 RX ADMIN — ACETAMINOPHEN 650 MG: 325 TABLET ORAL at 08:07

## 2021-11-02 RX ADMIN — SENNOSIDES AND DOCUSATE SODIUM 2 TABLET: 8.6; 5 TABLET ORAL at 18:13

## 2021-11-02 RX ADMIN — OXYCODONE HYDROCHLORIDE 10 MG: 10 TABLET ORAL at 05:04

## 2021-11-02 RX ADMIN — POTASSIUM CHLORIDE AND SODIUM CHLORIDE: 900; 150 INJECTION, SOLUTION INTRAVENOUS at 08:07

## 2021-11-02 RX ADMIN — ATORVASTATIN CALCIUM 40 MG: 40 TABLET, FILM COATED ORAL at 18:13

## 2021-11-02 RX ADMIN — MORPHINE SULFATE 4 MG: 4 INJECTION INTRAVENOUS at 21:38

## 2021-11-02 RX ADMIN — OXYCODONE HYDROCHLORIDE 10 MG: 10 TABLET ORAL at 20:29

## 2021-11-02 RX ADMIN — MORPHINE SULFATE 4 MG: 4 INJECTION INTRAVENOUS at 18:14

## 2021-11-02 RX ADMIN — MORPHINE SULFATE 4 MG: 4 INJECTION INTRAVENOUS at 15:12

## 2021-11-02 RX ADMIN — SENNOSIDES AND DOCUSATE SODIUM 2 TABLET: 8.6; 5 TABLET ORAL at 05:04

## 2021-11-02 RX ADMIN — MORPHINE SULFATE 7.5 MG: 15 TABLET ORAL at 04:07

## 2021-11-02 RX ADMIN — POTASSIUM CHLORIDE AND SODIUM CHLORIDE: 900; 150 INJECTION, SOLUTION INTRAVENOUS at 17:00

## 2021-11-02 RX ADMIN — KETOROLAC TROMETHAMINE 15 MG: 30 INJECTION, SOLUTION INTRAMUSCULAR; INTRAVENOUS at 06:34

## 2021-11-02 ASSESSMENT — ENCOUNTER SYMPTOMS
HEARTBURN: 0
HEMOPTYSIS: 0
DEPRESSION: 0
COUGH: 0
NAUSEA: 0
DIZZINESS: 0
SPUTUM PRODUCTION: 0
VOMITING: 0
ABDOMINAL PAIN: 0
BLURRED VISION: 0
PALPITATIONS: 0
CHILLS: 0
DOUBLE VISION: 0
SORE THROAT: 0
FOCAL WEAKNESS: 0
MYALGIAS: 0
ORTHOPNEA: 0
NERVOUS/ANXIOUS: 1
FEVER: 0

## 2021-11-02 ASSESSMENT — PAIN DESCRIPTION - PAIN TYPE
TYPE: ACUTE PAIN

## 2021-11-02 NOTE — ASSESSMENT & PLAN NOTE
X-ray showed Right distal femur periprosthetic fracture with subsided tibial component, Ortho DR Lockett was consulted and joe underwent Open treatment of right femoral supracondylar fracture without intracondylar extension on 11/03.  , PT/OT has recommended postacute,  snf referral has been place  Medically cleared to be discharged

## 2021-11-02 NOTE — ED NOTES
Medication history updated by interviewing patient.    Patient is no longer taking methadone but is on Oxycodone 10mg BID, Ibuprofen 800mg BID, gabapentin 300mg BID and 600mg at bedtime.    No recent antibiotics or OTC medication use reported    Nahum McgarryD, BCPS

## 2021-11-02 NOTE — ED NOTES
Pt resting comfortably in rney in low locked position with side rails up and call light within reach. NS with KCL started per MAR. VSS. Pt kept NPO for possible surgery tonight.

## 2021-11-02 NOTE — PROGRESS NOTES
Right distal femur periprosthetic fracture with subsided tibial component  Will need ORIF versus revision arthroplasty Wednesday  Have spoken with [facility] arthroplasty specialists  Knee immobilizer and NWB for now

## 2021-11-02 NOTE — ASSESSMENT & PLAN NOTE
Noted to have a left bundle branch block which has been chronic.  Echocardiogram showed EF of 45%, abnormal septal motion consistent with LBBB  Telemetry monitoring showed bradycardia, left cardiovascular, no obvious arrhythmia noted  Patient denied feeling lightheadedness

## 2021-11-02 NOTE — THERAPY
Physical Therapy     Patient Name: Sanam Andre  Age:  61 y.o., Sex:  female  Medical Record #: [id number]  Today's Date: 11/2/2021 11/02/21 1231   Initial Contact Note    Initial Contact Note Order Received and Verified. Physical Therapy Evaluation NOT Completed Because Patient Does Not Require Acute Physical Therapy at this Time.   Interdisciplinary Plan of Care Collaboration   IDT Collaboration with  Nursing   Collaboration Comments HOLD PT, pending surgery 11/3. Will need updated post-op PT orders at that time.

## 2021-11-02 NOTE — H&P
"Hospital Medicine History & Physical Note    Date of Service  11/1/2021    Primary Care Physician  [facility].    Consultants  Ortho, Dr Rendon      Code Status  Full Code    Chief Complaint  Chief Complaint   Patient presents with   • Syncope     Pt. reports syncope this AM, \"my vision went and I fell\". Reports she stayed on the ground for approx 9h- \"My daughter heard me go down but I didn't want her to move me\".    • Knee Pain     R knee- +hx arthritis, pain exacerbated by fall. ROM limited d/t pain, circulation and sensation intact.    • Laceration     Small lac to R low eyebrow- bleeding controlled.     History of Presenting Illness   Sanam Andre is a 61 y.o. female with a past medical history of hypertension, who presented 11/1/2021 with right lower extremity pain and swelling after a syncopal episode.  Patient reports that she had transient loss of consciousness this morning.  She reports that she woke up around 5 AM and felt dizzy and lightheaded and then lost consciousness she is not sure for how long but when she woke up she was not able to stand because of severe pain in her right lower extremity.  Pain is worse with attempting to move.  No relieving factors.  No radiation to other places.  She denies having palpitations prior to the event.    I discussed the plan of care with emergency department physician, and the patient.    Review of Systems  Review of Systems   Constitutional: Negative for chills and fever.   Eyes: Negative for discharge and redness.   Respiratory: Negative for cough, shortness of breath and stridor.    Cardiovascular: Negative for chest pain and leg swelling.   Gastrointestinal: Negative for abdominal pain and vomiting.   Genitourinary: Negative for flank pain.   Musculoskeletal: Positive for joint pain. Negative for myalgias.   Skin: Negative.    Neurological: Positive for loss of consciousness. Negative for focal weakness.   Endo/Heme/Allergies: Does not " bruise/bleed easily.   Psychiatric/Behavioral: The patient is not nervous/anxious.      Past Medical History   has a past medical history of Arrhythmia, Hepatitis C, Hypertension, Pain, and Rheumatoid arthritis(714.0).    Surgical History   has a past surgical history that includes gyn surgery (2000); other (1976); and knee arthroplasty total (9/15/2010).     Family History  No known history of heart disease in father or mother    Social History   reports that she has been smoking. She has never used smokeless tobacco. She reports that she does not drink alcohol and does not use drugs.    Allergies  Allergies   Allergen Reactions   • Codeine Vomiting   • Iodine Anaphylaxis     Reaction took place during CT and IV dye injection     Medications  Prior to Admission Medications   Prescriptions Last Dose Informant Patient Reported? Taking?   acetaminophen (TYLENOL) 325 MG Tab   No No   Sig: Take 2 Tablets by mouth every 6 hours as needed.   aspirin 81 MG EC tablet   No No   Sig: Take 1 Tablet by mouth every day.   atorvastatin (LIPITOR) 40 MG Tab   No No   Sig: Take 1 Tablet by mouth every evening.   guaiFENesin ER (MUCINEX) 600 MG TABLET SR 12 HR   No No   Sig: Take 1 Tablet by mouth every 12 hours.   lisinopril (PRINIVIL) 2.5 MG Tab   No No   Sig: Take 1 Tablet by mouth every day.   methadone (DOLOPHINE) 10 MG/ML Conc  Other Facility Yes No   Sig: Take 86 mg by mouth every day. 86 mg VERIFIED WITH [facility] 8/31      Facility-Administered Medications: None     Physical Exam  Temp:  [36.2 °C (97.1 °F)] 36.2 °C (97.1 °F)  Pulse:  [88-97] 88  Resp:  [16-18] 16  BP: ()/(66-77) 112/66  SpO2:  [92 %-96 %] 96 %  Blood Pressure: (!) 94/77   Temperature: 36.2 °C (97.1 °F)   Pulse: 91   Respiration: 18   Pulse Oximetry: 94 %     Physical Exam  Constitutional:       General: She is not in acute distress.  HENT:      Head: Normocephalic and atraumatic.      Comments: Cardiovascular laceration     Right  Ear: External ear normal.      Left Ear: External ear normal.      Nose: No congestion or rhinorrhea.      Mouth/Throat:      Mouth: Mucous membranes are dry.      Pharynx: No oropharyngeal exudate or posterior oropharyngeal erythema.   Eyes:      General: No scleral icterus.        Right eye: No discharge.         Left eye: No discharge.      Conjunctiva/sclera: Conjunctivae normal.      Pupils: Pupils are equal, round, and reactive to light.   Cardiovascular:      Heart sounds: No friction rub. No gallop.    Pulmonary:      Effort: Pulmonary effort is normal.   Abdominal:      General: Abdomen is flat. There is no distension.      Tenderness: There is no guarding.   Musculoskeletal:         General: No swelling.      Cervical back: Neck supple. No rigidity. No muscular tenderness.      Right lower leg: No edema.      Left lower leg: No edema.      Comments: Reduced range of motion right lower extremity secondary to pain   Skin:     General: Skin is dry.      Capillary Refill: Capillary refill takes 2 to 3 seconds.      Coloration: Skin is pale. Skin is not jaundiced.      Findings: No bruising or erythema.   Neurological:      Mental Status: She is alert and oriented to person, place, and time.   Psychiatric:         Mood and Affect: Mood normal.         Judgment: Judgment normal.       Laboratory:  Recent Labs     11/01/21  1550   WBC 8.3   RBC 3.88*   HEMOGLOBIN 13.2   HEMATOCRIT 39.2   .0*   MCH 34.0*   MCHC 33.7   RDW 46.7   PLATELETCT 194   MPV 9.6     Recent Labs     11/01/21  1550   SODIUM 138   POTASSIUM 3.5*   CHLORIDE 100   CO2 21   GLUCOSE 114*   BUN 33*   CREATININE 1.38   CALCIUM 10.2     Recent Labs     11/01/21  1550   ALTSGPT 43   ASTSGOT 31   ALKPHOSPHAT 95   TBILIRUBIN 0.4   GLUCOSE 114*         No results for input(s): NTPROBNP in the last 72 hours.      Recent Labs     11/01/21  1550   TROPONINT 18     Imaging:  DX-HIP-UNILATERAL-WITH PELVIS-1 VIEW RIGHT   Final Result      1.  No acute  fracture is identified.      DX-KNEE 2- RIGHT   Final Result   Addendum 1 of 1   Addendum:      Report dictated on the wrong exam.      There is a fracture of the distal right femoral metaphysis above the knee    arthroplasty. There is posterior angulation of the tibial component. There    is overlying soft tissue edema.      Impression:      Fracture of the distal femoral metaphysis, just superior to the femoral    component of the tricomponent knee arthroplasty      Final      Status post left total knee arthroplasty.      DX-CHEST-PORTABLE (1 VIEW)   Final Result      Medial right basilar opacity may represent early pneumonia or diaphragmatic prominence seen on recent CT. PA and lateral views of the chest may be helpful for further evaluation.      CT-HEAD W/O   Final Result      No acute intracranial abnormality.      EC-ECHOCARDIOGRAM COMPLETE W/O CONT    (Results Pending)     I personally reviewed patient EKG shows a sinus tachycardia with a rate of 92, left bundle branch block that is seen on prior EKGs,     Assessment/Plan:  I anticipate this patient will require at least two midnights for appropriate medical management, necessitating inpatient admission.    * Femoral fracture (HCC)- (present on admission)  Assessment & Plan  Orthopedics [Dr Rendon] consulted,  plan for OR   Multimodal pain control  Consider physical and Occupational Therapy, pharmacologic prophylaxis when okay with orthopedics     Syncope- (present on admission)  Assessment & Plan  EKG shows a sinus tachycardia with a rate of 92, left bundle branch block that is seen on prior EKGs,   Telemetry monitor  Echocardiography    Primary hypertension- (present on admission)  Assessment & Plan  We will hold hydrochlorothiazide given DAWSON, & dehydration.  We will reduce the dose of lisinopril given DAWSON.     DAWSON (acute kidney injury) (HCC)- (present on admission)  Assessment & Plan  Mostly due to dehydration   Intravenous fluids, will  reduce the dose of lisinopril  Avoid nephrotoxins, monitor inputs and outputs  I will check a renal ultrasound to rule out obstruction    Hx of Methadone dependence (HCC)- (present on admission)  Assessment & Plan  Currently off methadone    Hypokalemia- (present on admission)  Assessment & Plan  Replacing, checking magnesium   Continue to monitor replace as needed     Dyslipidemia- (present on admission)  Assessment & Plan  Resume home atorvastatin    VTE prophylaxis: SCDs/TEDs

## 2021-11-02 NOTE — THERAPY
Missed Therapy     Patient Name: Sanam Andre  Age:  61 y.o., Sex:  female  Medical Record #: [id number]  Today's Date: 11/2/2021    Discussed missed therapy with nursing        11/02/21 0806   Interdisciplinary Plan of Care Collaboration   IDT Collaboration with  Nursing   Collaboration Comments Per RN, patient is tolerating her current diet and swallow evaluation is not indicated at this time. RN to cancel

## 2021-11-02 NOTE — RESPIRATORY CARE
"   COPD EDUCATION by COPD CLINICAL EDUCATOR  11/2/2021 at 2:12 PM by Yulisa Newsome, RRT     Patient reviewed by COPD education team. Patient does not have a formal history or diagnosis of COPD. Patient is a smoker, smoking cessation education done. A comprehensive packet with tips to quit and information on outpatient classes given to patient.   Smoking Cessation Intervention and education completed, 20 minutes spent on smoking cessation education with patient.  Provided smoking cessation packet with \"Tips to Quit\" and brochure for \"Free Smoking Cessation Classes\".     Also provided a short intervention completed with this patient covering: What is COPD (how the lungs work), daily medications rescue and maintenance, breathing techniques, infection prevention and oxygen safety were covered in detail.  A comprehensive packet including information about COPD, treatments, and oxygen safety was given.    Encourage follow up with Pulmonary once pt up on feel again.         COPD Screen  COPD Risk Screening  Do you have a history of COPD?: Yes  Do you have a Pulmonologist?: No    COPD Assessment  COPD Clinical Specialists ONLY  COPD Education Initiated: Yes--Short Intervention (No PFT on file to dx COPD, pt is not compliant with medications has taken them but not currently on any, current smoker education provided and information on seeing a Pulmonologist and knowing lung function, pt understands was going to try zyn pouches)  Referrals Initiated:  (information given on [facility] and Pulmonary PFT)  Is this a COPD exacerbation patient?: No    Meds to Beds        MY COPD ACTION PLAN     It is recommended that patients and physicians /healthcare providers complete this action plan together. This plan should be discussed at each physician visit and updated as needed.    The green, yellow and red zones show groups of symptoms of COPD. This list of symptoms is not comprehensive, and you may experience other symptoms. " "In the \"Actions\" column, your healthcare provider has recommended actions for you to take based on your symptoms.    Patient Name: Sanam Andre   YOB: 1960   Last Updated on:     Green Zone:  I am doing well today Actions   •  Usual activitiy and exercise level •  Take daily medications   •  Usual amounts of cough and phlegm/mucus •  Use oxygen as prescribed   •  Sleep well at night •  Continue regular exercise/diet plan   •  Appetite is good •  At all times avoid cigarette smoke, inhaled irritants     Daily Medications (these medications are taken every day):                Yellow Zone:  I am having a bad day or a COPD flare Actions   •  More breathless than usual •  Continue daily medications   •  I have less energy for my daily activities •  Use quick relief inhaler as ordered   •  Increased or thicker phlegm/mucus •  Use oxygen as prescribed   •  Using quick relief inhaler/nebulizer more often •  Get plenty of rest   •  Swelling of ankles more than usual •  Use pursed lip breathing   •  More coughing than usual •  At all times avoid cigarette smoke, inhaled irritants   •  I feel like I have a \"chest cold\"   •  Poor sleep and my symptoms woke me up   •  My appetite is not good   •  My medicine is not helping    •  Call provider immediately if symptoms don’t improve     Continue daily medications, add rescue medications:               Medications to be used during a flare up, (as Discussed with Provider):              Red Zone:  I need urgent medical care Actions   •  Severe shortness of breath even at rest •  Call 911 or seek medical care immediately   •  Not able to do any activity because of breathing    •  Fever or shaking chills    •  Feeling confused or very drowsy     •  Chest pains    •  Coughing up blood              "

## 2021-11-02 NOTE — PROGRESS NOTES
Telemetry Shift Summary     Rhythm: SR with BBB  HR: 89-92  Ectopy: none    Measurements: 0.20/0.16/0.36    Normal Values  Rhythm: SR  HR:   Measurements: 0.12-0.20/0.08-0.10/0.30-0.52

## 2021-11-02 NOTE — DISCHARGE PLANNING
Anticipated Discharge Disposition: Pending PT/OT after surgery    Action: Discussed pt in IDT rounds. Per MD, pt scheduled for surgery tomorrow. Per MD, will have PT/OT evaluate pt on Thursday for possible d/c on Friday. Current 6-clicks is 19.     Barriers to Discharge: Pending PT/OT after surgery    Plan: LSW to follow up with PT/OT recs after surgery.

## 2021-11-02 NOTE — PROGRESS NOTES
Received bedside report from Night RN. Awake and alert. Patient complained of severe headache pain. Stated it was from the toradol she received this am. Also complains of right leg pain. Morphine sulfate 4 mg IVP given for pain. Patient is tearful and anxious due to her constant pain issues.

## 2021-11-03 ENCOUNTER — APPOINTMENT (OUTPATIENT)
Dept: RADIOLOGY | Facility: MEDICAL CENTER | Age: 61
DRG: 481 | End: 2021-11-03
Attending: ORTHOPAEDIC SURGERY
Payer: MEDICARE

## 2021-11-03 ENCOUNTER — ANESTHESIA (OUTPATIENT)
Dept: SURGERY | Facility: MEDICAL CENTER | Age: 61
DRG: 481 | End: 2021-11-03
Payer: MEDICARE

## 2021-11-03 ENCOUNTER — ANESTHESIA EVENT (OUTPATIENT)
Dept: SURGERY | Facility: MEDICAL CENTER | Age: 61
DRG: 481 | End: 2021-11-03
Payer: MEDICARE

## 2021-11-03 PROBLEM — I50.22 CHRONIC SYSTOLIC HEART FAILURE (HCC): Status: ACTIVE | Noted: 2021-11-03

## 2021-11-03 LAB
ALBUMIN SERPL BCP-MCNC: 3.4 G/DL (ref 3.2–4.9)
BASOPHILS # BLD AUTO: 0.5 % (ref 0–1.8)
BASOPHILS # BLD: 0.04 K/UL (ref 0–0.12)
BUN SERPL-MCNC: 18 MG/DL (ref 8–22)
CALCIUM SERPL-MCNC: 9.8 MG/DL (ref 8.4–10.2)
CHLORIDE SERPL-SCNC: 109 MMOL/L (ref 96–112)
CO2 SERPL-SCNC: 22 MMOL/L (ref 20–33)
CREAT SERPL-MCNC: 0.68 MG/DL (ref 0.5–1.4)
EOSINOPHIL # BLD AUTO: 0.3 K/UL (ref 0–0.51)
EOSINOPHIL NFR BLD: 4.1 % (ref 0–6.9)
ERYTHROCYTE [DISTWIDTH] IN BLOOD BY AUTOMATED COUNT: 46.4 FL (ref 35.9–50)
GLUCOSE SERPL-MCNC: 90 MG/DL (ref 65–99)
HCT VFR BLD AUTO: 34.9 % (ref 37–47)
HGB BLD-MCNC: 11.3 G/DL (ref 12–16)
IMM GRANULOCYTES # BLD AUTO: 0.03 K/UL (ref 0–0.11)
IMM GRANULOCYTES NFR BLD AUTO: 0.4 % (ref 0–0.9)
LYMPHOCYTES # BLD AUTO: 2.1 K/UL (ref 1–4.8)
LYMPHOCYTES NFR BLD: 28.6 % (ref 22–41)
MCH RBC QN AUTO: 33.3 PG (ref 27–33)
MCHC RBC AUTO-ENTMCNC: 32.4 G/DL (ref 33.6–35)
MCV RBC AUTO: 102.9 FL (ref 81.4–97.8)
MONOCYTES # BLD AUTO: 0.64 K/UL (ref 0–0.85)
MONOCYTES NFR BLD AUTO: 8.7 % (ref 0–13.4)
NEUTROPHILS # BLD AUTO: 4.24 K/UL (ref 2–7.15)
NEUTROPHILS NFR BLD: 57.7 % (ref 44–72)
NRBC # BLD AUTO: 0 K/UL
NRBC BLD-RTO: 0 /100 WBC
PHOSPHATE SERPL-MCNC: 3.5 MG/DL (ref 2.5–4.5)
PLATELET # BLD AUTO: 162 K/UL (ref 164–446)
PMV BLD AUTO: 9.8 FL (ref 9–12.9)
POTASSIUM SERPL-SCNC: 5 MMOL/L (ref 3.6–5.5)
RBC # BLD AUTO: 3.39 M/UL (ref 4.2–5.4)
SODIUM SERPL-SCNC: 137 MMOL/L (ref 135–145)
WBC # BLD AUTO: 7.4 K/UL (ref 4.8–10.8)

## 2021-11-03 PROCEDURE — 700101 HCHG RX REV CODE 250: Performed by: HOSPITALIST

## 2021-11-03 PROCEDURE — 502000 HCHG MISC OR IMPLANTS RC 0278: Performed by: ORTHOPAEDIC SURGERY

## 2021-11-03 PROCEDURE — 94760 N-INVAS EAR/PLS OXIMETRY 1: CPT

## 2021-11-03 PROCEDURE — 700105 HCHG RX REV CODE 258: Performed by: ORTHOPAEDIC SURGERY

## 2021-11-03 PROCEDURE — 160036 HCHG PACU - EA ADDL 30 MINS PHASE I: Performed by: ORTHOPAEDIC SURGERY

## 2021-11-03 PROCEDURE — 36415 COLL VENOUS BLD VENIPUNCTURE: CPT

## 2021-11-03 PROCEDURE — 0QSB04Z REPOSITION RIGHT LOWER FEMUR WITH INTERNAL FIXATION DEVICE, OPEN APPROACH: ICD-10-PCS | Performed by: ORTHOPAEDIC SURGERY

## 2021-11-03 PROCEDURE — A9270 NON-COVERED ITEM OR SERVICE: HCPCS | Performed by: ORTHOPAEDIC SURGERY

## 2021-11-03 PROCEDURE — 160028 HCHG SURGERY MINUTES - 1ST 30 MINS LEVEL 3: Performed by: ORTHOPAEDIC SURGERY

## 2021-11-03 PROCEDURE — 700111 HCHG RX REV CODE 636 W/ 250 OVERRIDE (IP): Performed by: HOSPITALIST

## 2021-11-03 PROCEDURE — 160002 HCHG RECOVERY MINUTES (STAT): Performed by: ORTHOPAEDIC SURGERY

## 2021-11-03 PROCEDURE — 500891 HCHG PACK, ORTHO MAJOR: Performed by: ORTHOPAEDIC SURGERY

## 2021-11-03 PROCEDURE — 94669 MECHANICAL CHEST WALL OSCILL: CPT

## 2021-11-03 PROCEDURE — 160048 HCHG OR STATISTICAL LEVEL 1-5: Performed by: ORTHOPAEDIC SURGERY

## 2021-11-03 PROCEDURE — 73552 X-RAY EXAM OF FEMUR 2/>: CPT | Mod: RT

## 2021-11-03 PROCEDURE — 160039 HCHG SURGERY MINUTES - EA ADDL 1 MIN LEVEL 3: Performed by: ORTHOPAEDIC SURGERY

## 2021-11-03 PROCEDURE — C1713 ANCHOR/SCREW BN/BN,TIS/BN: HCPCS | Performed by: ORTHOPAEDIC SURGERY

## 2021-11-03 PROCEDURE — 700111 HCHG RX REV CODE 636 W/ 250 OVERRIDE (IP): Performed by: ANESTHESIOLOGY

## 2021-11-03 PROCEDURE — 160035 HCHG PACU - 1ST 60 MINS PHASE I: Performed by: ORTHOPAEDIC SURGERY

## 2021-11-03 PROCEDURE — 700111 HCHG RX REV CODE 636 W/ 250 OVERRIDE (IP): Performed by: ORTHOPAEDIC SURGERY

## 2021-11-03 PROCEDURE — 99222 1ST HOSP IP/OBS MODERATE 55: CPT | Mod: 57 | Performed by: ORTHOPAEDIC SURGERY

## 2021-11-03 PROCEDURE — 27511 TREATMENT OF THIGH FRACTURE: CPT | Mod: RT | Performed by: ORTHOPAEDIC SURGERY

## 2021-11-03 PROCEDURE — 27511 TREATMENT OF THIGH FRACTURE: CPT | Mod: ASROC,RT | Performed by: PHYSICIAN ASSISTANT

## 2021-11-03 PROCEDURE — 770006 HCHG ROOM/CARE - MED/SURG/GYN SEMI*

## 2021-11-03 PROCEDURE — 99232 SBSQ HOSP IP/OBS MODERATE 35: CPT | Performed by: HOSPITALIST

## 2021-11-03 PROCEDURE — 700101 HCHG RX REV CODE 250: Performed by: ORTHOPAEDIC SURGERY

## 2021-11-03 PROCEDURE — 85025 COMPLETE CBC W/AUTO DIFF WBC: CPT

## 2021-11-03 PROCEDURE — 502240 HCHG MISC OR SUPPLY RC 0272: Performed by: ORTHOPAEDIC SURGERY

## 2021-11-03 PROCEDURE — 501838 HCHG SUTURE GENERAL: Performed by: ORTHOPAEDIC SURGERY

## 2021-11-03 PROCEDURE — 700101 HCHG RX REV CODE 250: Performed by: ANESTHESIOLOGY

## 2021-11-03 PROCEDURE — 700102 HCHG RX REV CODE 250 W/ 637 OVERRIDE(OP): Performed by: HOSPITALIST

## 2021-11-03 PROCEDURE — 160009 HCHG ANES TIME/MIN: Performed by: ORTHOPAEDIC SURGERY

## 2021-11-03 PROCEDURE — 80069 RENAL FUNCTION PANEL: CPT

## 2021-11-03 PROCEDURE — A9270 NON-COVERED ITEM OR SERVICE: HCPCS | Performed by: HOSPITALIST

## 2021-11-03 PROCEDURE — 700102 HCHG RX REV CODE 250 W/ 637 OVERRIDE(OP): Performed by: ORTHOPAEDIC SURGERY

## 2021-11-03 DEVICE — IMPLANTABLE DEVICE: Type: IMPLANTABLE DEVICE | Status: FUNCTIONAL

## 2021-11-03 RX ORDER — LIDOCAINE HYDROCHLORIDE 20 MG/ML
INJECTION, SOLUTION EPIDURAL; INFILTRATION; INTRACAUDAL; PERINEURAL PRN
Status: DISCONTINUED | OUTPATIENT
Start: 2021-11-03 | End: 2021-11-03 | Stop reason: SURG

## 2021-11-03 RX ORDER — CELECOXIB 200 MG/1
200 CAPSULE ORAL ONCE
Status: CANCELLED | OUTPATIENT
Start: 2021-11-03 | End: 2021-11-03

## 2021-11-03 RX ORDER — ACETAMINOPHEN 325 MG/1
650 TABLET ORAL EVERY 6 HOURS PRN
Status: DISCONTINUED | OUTPATIENT
Start: 2021-11-08 | End: 2021-11-08 | Stop reason: HOSPADM

## 2021-11-03 RX ORDER — AMOXICILLIN 250 MG
1 CAPSULE ORAL NIGHTLY
Status: DISCONTINUED | OUTPATIENT
Start: 2021-11-03 | End: 2021-11-03

## 2021-11-03 RX ORDER — CEFAZOLIN SODIUM IN 0.9 % NACL 2 G/100 ML
2 PLASTIC BAG, INJECTION (ML) INTRAVENOUS EVERY 8 HOURS
Status: COMPLETED | OUTPATIENT
Start: 2021-11-03 | End: 2021-11-04

## 2021-11-03 RX ORDER — LABETALOL HYDROCHLORIDE 5 MG/ML
5 INJECTION, SOLUTION INTRAVENOUS
Status: DISCONTINUED | OUTPATIENT
Start: 2021-11-03 | End: 2021-11-03 | Stop reason: HOSPADM

## 2021-11-03 RX ORDER — ONDANSETRON 2 MG/ML
4 INJECTION INTRAMUSCULAR; INTRAVENOUS
Status: DISCONTINUED | OUTPATIENT
Start: 2021-11-03 | End: 2021-11-03 | Stop reason: HOSPADM

## 2021-11-03 RX ORDER — SODIUM CHLORIDE, SODIUM LACTATE, POTASSIUM CHLORIDE, CALCIUM CHLORIDE 600; 310; 30; 20 MG/100ML; MG/100ML; MG/100ML; MG/100ML
INJECTION, SOLUTION INTRAVENOUS CONTINUOUS
Status: DISCONTINUED | OUTPATIENT
Start: 2021-11-03 | End: 2021-11-03 | Stop reason: HOSPADM

## 2021-11-03 RX ORDER — MIDAZOLAM HYDROCHLORIDE 1 MG/ML
INJECTION INTRAMUSCULAR; INTRAVENOUS PRN
Status: DISCONTINUED | OUTPATIENT
Start: 2021-11-03 | End: 2021-11-03 | Stop reason: SURG

## 2021-11-03 RX ORDER — HYDRALAZINE HYDROCHLORIDE 20 MG/ML
5 INJECTION INTRAMUSCULAR; INTRAVENOUS
Status: DISCONTINUED | OUTPATIENT
Start: 2021-11-03 | End: 2021-11-03 | Stop reason: HOSPADM

## 2021-11-03 RX ORDER — CEFAZOLIN SODIUM 1 G/3ML
INJECTION, POWDER, FOR SOLUTION INTRAMUSCULAR; INTRAVENOUS PRN
Status: DISCONTINUED | OUTPATIENT
Start: 2021-11-03 | End: 2021-11-03 | Stop reason: SURG

## 2021-11-03 RX ORDER — HYDROMORPHONE HYDROCHLORIDE 1 MG/ML
0.5 INJECTION, SOLUTION INTRAMUSCULAR; INTRAVENOUS; SUBCUTANEOUS
Status: DISCONTINUED | OUTPATIENT
Start: 2021-11-03 | End: 2021-11-08 | Stop reason: HOSPADM

## 2021-11-03 RX ORDER — HYDROMORPHONE HYDROCHLORIDE 2 MG/ML
INJECTION, SOLUTION INTRAMUSCULAR; INTRAVENOUS; SUBCUTANEOUS PRN
Status: DISCONTINUED | OUTPATIENT
Start: 2021-11-03 | End: 2021-11-03 | Stop reason: SURG

## 2021-11-03 RX ORDER — ONDANSETRON 2 MG/ML
INJECTION INTRAMUSCULAR; INTRAVENOUS PRN
Status: DISCONTINUED | OUTPATIENT
Start: 2021-11-03 | End: 2021-11-03 | Stop reason: SURG

## 2021-11-03 RX ORDER — ACETAMINOPHEN 500 MG
1000 TABLET ORAL ONCE
Status: CANCELLED | OUTPATIENT
Start: 2021-11-03 | End: 2021-11-03

## 2021-11-03 RX ORDER — OXYCODONE HCL 5 MG/5 ML
5 SOLUTION, ORAL ORAL
Status: DISCONTINUED | OUTPATIENT
Start: 2021-11-03 | End: 2021-11-03 | Stop reason: HOSPADM

## 2021-11-03 RX ORDER — DIPHENHYDRAMINE HYDROCHLORIDE 50 MG/ML
12.5 INJECTION INTRAMUSCULAR; INTRAVENOUS
Status: DISCONTINUED | OUTPATIENT
Start: 2021-11-03 | End: 2021-11-03 | Stop reason: HOSPADM

## 2021-11-03 RX ORDER — OXYCODONE HYDROCHLORIDE 5 MG/1
5 TABLET ORAL
Status: DISCONTINUED | OUTPATIENT
Start: 2021-11-03 | End: 2021-11-08 | Stop reason: HOSPADM

## 2021-11-03 RX ORDER — MORPHINE SULFATE 4 MG/ML
4 INJECTION, SOLUTION INTRAMUSCULAR; INTRAVENOUS
Status: DISCONTINUED | OUTPATIENT
Start: 2021-11-03 | End: 2021-11-08 | Stop reason: HOSPADM

## 2021-11-03 RX ORDER — POLYETHYLENE GLYCOL 3350 17 G/17G
1 POWDER, FOR SOLUTION ORAL 2 TIMES DAILY PRN
Status: DISCONTINUED | OUTPATIENT
Start: 2021-11-03 | End: 2021-11-08 | Stop reason: HOSPADM

## 2021-11-03 RX ORDER — HYDROMORPHONE HYDROCHLORIDE 1 MG/ML
0.4 INJECTION, SOLUTION INTRAMUSCULAR; INTRAVENOUS; SUBCUTANEOUS
Status: DISCONTINUED | OUTPATIENT
Start: 2021-11-03 | End: 2021-11-03 | Stop reason: HOSPADM

## 2021-11-03 RX ORDER — AMOXICILLIN 250 MG
1 CAPSULE ORAL
Status: DISCONTINUED | OUTPATIENT
Start: 2021-11-03 | End: 2021-11-08 | Stop reason: HOSPADM

## 2021-11-03 RX ORDER — KETOROLAC TROMETHAMINE 30 MG/ML
INJECTION, SOLUTION INTRAMUSCULAR; INTRAVENOUS PRN
Status: DISCONTINUED | OUTPATIENT
Start: 2021-11-03 | End: 2021-11-03 | Stop reason: SURG

## 2021-11-03 RX ORDER — DEXAMETHASONE SODIUM PHOSPHATE 4 MG/ML
INJECTION, SOLUTION INTRA-ARTICULAR; INTRALESIONAL; INTRAMUSCULAR; INTRAVENOUS; SOFT TISSUE PRN
Status: DISCONTINUED | OUTPATIENT
Start: 2021-11-03 | End: 2021-11-03 | Stop reason: SURG

## 2021-11-03 RX ORDER — DOCUSATE SODIUM 100 MG/1
100 CAPSULE, LIQUID FILLED ORAL 2 TIMES DAILY
Status: DISCONTINUED | OUTPATIENT
Start: 2021-11-03 | End: 2021-11-08 | Stop reason: HOSPADM

## 2021-11-03 RX ORDER — HALOPERIDOL 5 MG/ML
1 INJECTION INTRAMUSCULAR EVERY 6 HOURS PRN
Status: DISCONTINUED | OUTPATIENT
Start: 2021-11-03 | End: 2021-11-08 | Stop reason: HOSPADM

## 2021-11-03 RX ORDER — HYDROMORPHONE HYDROCHLORIDE 1 MG/ML
0.2 INJECTION, SOLUTION INTRAMUSCULAR; INTRAVENOUS; SUBCUTANEOUS
Status: DISCONTINUED | OUTPATIENT
Start: 2021-11-03 | End: 2021-11-03 | Stop reason: HOSPADM

## 2021-11-03 RX ORDER — BISACODYL 10 MG
10 SUPPOSITORY, RECTAL RECTAL
Status: DISCONTINUED | OUTPATIENT
Start: 2021-11-03 | End: 2021-11-08 | Stop reason: HOSPADM

## 2021-11-03 RX ORDER — OXYCODONE HCL 5 MG/5 ML
10 SOLUTION, ORAL ORAL
Status: DISCONTINUED | OUTPATIENT
Start: 2021-11-03 | End: 2021-11-03 | Stop reason: HOSPADM

## 2021-11-03 RX ORDER — MEPERIDINE HYDROCHLORIDE 25 MG/ML
6.25 INJECTION INTRAMUSCULAR; INTRAVENOUS; SUBCUTANEOUS
Status: DISCONTINUED | OUTPATIENT
Start: 2021-11-03 | End: 2021-11-03 | Stop reason: HOSPADM

## 2021-11-03 RX ORDER — IBUPROFEN 400 MG/1
800 TABLET ORAL 3 TIMES DAILY PRN
Status: DISCONTINUED | OUTPATIENT
Start: 2021-11-06 | End: 2021-11-06

## 2021-11-03 RX ORDER — ENEMA 19; 7 G/133ML; G/133ML
1 ENEMA RECTAL
Status: DISCONTINUED | OUTPATIENT
Start: 2021-11-03 | End: 2021-11-08 | Stop reason: HOSPADM

## 2021-11-03 RX ORDER — HYDROMORPHONE HYDROCHLORIDE 1 MG/ML
0.1 INJECTION, SOLUTION INTRAMUSCULAR; INTRAVENOUS; SUBCUTANEOUS
Status: DISCONTINUED | OUTPATIENT
Start: 2021-11-03 | End: 2021-11-03 | Stop reason: HOSPADM

## 2021-11-03 RX ORDER — DEXMEDETOMIDINE HYDROCHLORIDE 100 UG/ML
INJECTION, SOLUTION INTRAVENOUS PRN
Status: DISCONTINUED | OUTPATIENT
Start: 2021-11-03 | End: 2021-11-03 | Stop reason: SURG

## 2021-11-03 RX ORDER — ACETAMINOPHEN 325 MG/1
650 TABLET ORAL EVERY 6 HOURS
Status: DISCONTINUED | OUTPATIENT
Start: 2021-11-03 | End: 2021-11-08 | Stop reason: HOSPADM

## 2021-11-03 RX ORDER — KETOROLAC TROMETHAMINE 30 MG/ML
15 INJECTION, SOLUTION INTRAMUSCULAR; INTRAVENOUS EVERY 6 HOURS
Status: DISCONTINUED | OUTPATIENT
Start: 2021-11-03 | End: 2021-11-06

## 2021-11-03 RX ORDER — OXYCODONE HYDROCHLORIDE 10 MG/1
10 TABLET ORAL
Status: DISCONTINUED | OUTPATIENT
Start: 2021-11-03 | End: 2021-11-08 | Stop reason: HOSPADM

## 2021-11-03 RX ORDER — HALOPERIDOL 5 MG/ML
1 INJECTION INTRAMUSCULAR
Status: DISCONTINUED | OUTPATIENT
Start: 2021-11-03 | End: 2021-11-03 | Stop reason: HOSPADM

## 2021-11-03 RX ORDER — DIPHENHYDRAMINE HYDROCHLORIDE 50 MG/ML
25 INJECTION INTRAMUSCULAR; INTRAVENOUS EVERY 6 HOURS PRN
Status: DISCONTINUED | OUTPATIENT
Start: 2021-11-03 | End: 2021-11-08 | Stop reason: HOSPADM

## 2021-11-03 RX ORDER — SODIUM CHLORIDE, SODIUM LACTATE, POTASSIUM CHLORIDE, CALCIUM CHLORIDE 600; 310; 30; 20 MG/100ML; MG/100ML; MG/100ML; MG/100ML
INJECTION, SOLUTION INTRAVENOUS CONTINUOUS
Status: ACTIVE | OUTPATIENT
Start: 2021-11-03 | End: 2021-11-03

## 2021-11-03 RX ORDER — ONDANSETRON 2 MG/ML
4 INJECTION INTRAMUSCULAR; INTRAVENOUS EVERY 4 HOURS PRN
Status: DISCONTINUED | OUTPATIENT
Start: 2021-11-03 | End: 2021-11-08 | Stop reason: HOSPADM

## 2021-11-03 RX ORDER — DEXAMETHASONE SODIUM PHOSPHATE 4 MG/ML
4 INJECTION, SOLUTION INTRA-ARTICULAR; INTRALESIONAL; INTRAMUSCULAR; INTRAVENOUS; SOFT TISSUE
Status: DISCONTINUED | OUTPATIENT
Start: 2021-11-03 | End: 2021-11-08 | Stop reason: HOSPADM

## 2021-11-03 RX ORDER — SCOLOPAMINE TRANSDERMAL SYSTEM 1 MG/1
1 PATCH, EXTENDED RELEASE TRANSDERMAL
Status: DISCONTINUED | OUTPATIENT
Start: 2021-11-03 | End: 2021-11-08 | Stop reason: HOSPADM

## 2021-11-03 RX ADMIN — ONDANSETRON 4 MG: 2 INJECTION INTRAMUSCULAR; INTRAVENOUS at 12:45

## 2021-11-03 RX ADMIN — ATORVASTATIN CALCIUM 40 MG: 40 TABLET, FILM COATED ORAL at 17:40

## 2021-11-03 RX ADMIN — MORPHINE SULFATE 4 MG: 4 INJECTION INTRAVENOUS at 21:47

## 2021-11-03 RX ADMIN — DOCUSATE SODIUM 100 MG: 100 CAPSULE, LIQUID FILLED ORAL at 17:40

## 2021-11-03 RX ADMIN — HYDROMORPHONE HYDROCHLORIDE 0.5 MG: 1 INJECTION, SOLUTION INTRAMUSCULAR; INTRAVENOUS; SUBCUTANEOUS at 04:28

## 2021-11-03 RX ADMIN — SODIUM CHLORIDE, POTASSIUM CHLORIDE, SODIUM LACTATE AND CALCIUM CHLORIDE: 600; 310; 30; 20 INJECTION, SOLUTION INTRAVENOUS at 12:15

## 2021-11-03 RX ADMIN — MIDAZOLAM HYDROCHLORIDE 2 MG: 1 INJECTION, SOLUTION INTRAMUSCULAR; INTRAVENOUS at 12:15

## 2021-11-03 RX ADMIN — DEXMEDETOMIDINE 12.5 MCG: 200 INJECTION, SOLUTION INTRAVENOUS at 12:40

## 2021-11-03 RX ADMIN — MORPHINE SULFATE 7.5 MG: 15 TABLET ORAL at 18:36

## 2021-11-03 RX ADMIN — HYDROMORPHONE HYDROCHLORIDE 0.5 MG: 1 INJECTION, SOLUTION INTRAMUSCULAR; INTRAVENOUS; SUBCUTANEOUS at 22:52

## 2021-11-03 RX ADMIN — KETOROLAC TROMETHAMINE 15 MG: 30 INJECTION, SOLUTION INTRAMUSCULAR at 12:59

## 2021-11-03 RX ADMIN — FENTANYL CITRATE 50 MCG: 50 INJECTION, SOLUTION INTRAMUSCULAR; INTRAVENOUS at 12:40

## 2021-11-03 RX ADMIN — DEXAMETHASONE SODIUM PHOSPHATE 8 MG: 4 INJECTION, SOLUTION INTRAMUSCULAR; INTRAVENOUS at 12:20

## 2021-11-03 RX ADMIN — OXYCODONE HYDROCHLORIDE 10 MG: 10 TABLET ORAL at 00:00

## 2021-11-03 RX ADMIN — MORPHINE SULFATE 4 MG: 4 INJECTION INTRAVENOUS at 02:44

## 2021-11-03 RX ADMIN — PROPOFOL 200 MG: 10 INJECTION, EMULSION INTRAVENOUS at 12:19

## 2021-11-03 RX ADMIN — FENTANYL CITRATE 50 MCG: 50 INJECTION, SOLUTION INTRAMUSCULAR; INTRAVENOUS at 14:16

## 2021-11-03 RX ADMIN — FENTANYL CITRATE 50 MCG: 50 INJECTION, SOLUTION INTRAMUSCULAR; INTRAVENOUS at 12:45

## 2021-11-03 RX ADMIN — CEFAZOLIN 2 G: 1 INJECTION, POWDER, FOR SOLUTION INTRAVENOUS at 16:53

## 2021-11-03 RX ADMIN — GABAPENTIN 600 MG: 300 CAPSULE ORAL at 20:35

## 2021-11-03 RX ADMIN — ACETAMINOPHEN 650 MG: 325 TABLET, FILM COATED ORAL at 17:40

## 2021-11-03 RX ADMIN — MORPHINE SULFATE 4 MG: 4 INJECTION INTRAVENOUS at 05:55

## 2021-11-03 RX ADMIN — HYDROMORPHONE HYDROCHLORIDE 0.5 MG: 1 INJECTION, SOLUTION INTRAMUSCULAR; INTRAVENOUS; SUBCUTANEOUS at 16:49

## 2021-11-03 RX ADMIN — HYDROMORPHONE HYDROCHLORIDE 0.5 MG: 2 INJECTION, SOLUTION INTRAMUSCULAR; INTRAVENOUS; SUBCUTANEOUS at 12:33

## 2021-11-03 RX ADMIN — HYDROMORPHONE HYDROCHLORIDE 0.5 MG: 1 INJECTION, SOLUTION INTRAMUSCULAR; INTRAVENOUS; SUBCUTANEOUS at 01:10

## 2021-11-03 RX ADMIN — LIDOCAINE HYDROCHLORIDE 50 MG: 20 INJECTION, SOLUTION EPIDURAL; INFILTRATION; INTRACAUDAL; PERINEURAL at 12:19

## 2021-11-03 RX ADMIN — HYDROMORPHONE HYDROCHLORIDE 0.5 MG: 1 INJECTION, SOLUTION INTRAMUSCULAR; INTRAVENOUS; SUBCUTANEOUS at 08:16

## 2021-11-03 RX ADMIN — CEFAZOLIN 2 G: 330 INJECTION, POWDER, FOR SOLUTION INTRAMUSCULAR; INTRAVENOUS at 12:20

## 2021-11-03 RX ADMIN — HYDROMORPHONE HYDROCHLORIDE 0.5 MG: 2 INJECTION, SOLUTION INTRAMUSCULAR; INTRAVENOUS; SUBCUTANEOUS at 12:19

## 2021-11-03 RX ADMIN — MORPHINE SULFATE 4 MG: 4 INJECTION INTRAVENOUS at 15:16

## 2021-11-03 RX ADMIN — OXYCODONE HYDROCHLORIDE 10 MG: 10 TABLET ORAL at 20:35

## 2021-11-03 RX ADMIN — POTASSIUM CHLORIDE AND SODIUM CHLORIDE: 900; 150 INJECTION, SOLUTION INTRAVENOUS at 07:48

## 2021-11-03 RX ADMIN — MORPHINE SULFATE 4 MG: 4 INJECTION INTRAVENOUS at 10:28

## 2021-11-03 RX ADMIN — FENTANYL CITRATE 50 MCG: 50 INJECTION, SOLUTION INTRAMUSCULAR; INTRAVENOUS at 14:21

## 2021-11-03 ASSESSMENT — PAIN DESCRIPTION - PAIN TYPE
TYPE: SURGICAL PAIN
TYPE: ACUTE PAIN;SURGICAL PAIN
TYPE: SURGICAL PAIN
TYPE: ACUTE PAIN
TYPE: SURGICAL PAIN;ACUTE PAIN
TYPE: SURGICAL PAIN
TYPE: ACUTE PAIN
TYPE: SURGICAL PAIN
TYPE: ACUTE PAIN
TYPE: ACUTE PAIN
TYPE: ACUTE PAIN;SURGICAL PAIN
TYPE: ACUTE PAIN;SURGICAL PAIN
TYPE: ACUTE PAIN
TYPE: SURGICAL PAIN

## 2021-11-03 ASSESSMENT — ENCOUNTER SYMPTOMS
PALPITATIONS: 0
VOMITING: 0
ABDOMINAL PAIN: 0
FEVER: 0
HEMOPTYSIS: 0
DIZZINESS: 0
DOUBLE VISION: 0
SPUTUM PRODUCTION: 0
DEPRESSION: 0
MYALGIAS: 0
HEARTBURN: 0
ORTHOPNEA: 0
NERVOUS/ANXIOUS: 1
FOCAL WEAKNESS: 0
COUGH: 0
NAUSEA: 0
SORE THROAT: 0
BLURRED VISION: 0
CHILLS: 0

## 2021-11-03 ASSESSMENT — FIBROSIS 4 INDEX: FIB4 SCORE: 1.71

## 2021-11-03 ASSESSMENT — PAIN SCALES - GENERAL: PAIN_LEVEL: 5

## 2021-11-03 NOTE — PROGRESS NOTES
Telemetry Shift Summary    Rhythm SR with BBB  HR Range 90-91  Ectopy rPVC< PAC  Measurements 0.20/0.12/0.36        Normal Values  Rhythm SR  HR Range    Measurements 0.12-0.20 / 0.06-0.10  / 0.30-0.52

## 2021-11-03 NOTE — PROGRESS NOTES
Telemetry Shift Summary    Rhythm SR - ST with BBB  HR Range 89 - 101  Ectopy rPVCs & rPACs  Measurements 0.20/0.16/0.36        Normal Values  Rhythm SR  HR Range    Measurements 0.12-0.20 / 0.06-0.10  / 0.30-0.52

## 2021-11-03 NOTE — OP REPORT
DATE OF OPERATION: 11/3/2021     PREOPERATIVE DIAGNOSIS: Right distal femur periprosthetic fracture    POSTOPERATIVE DIAGNOSIS: Same    PROCEDURE PERFORMED: Open treatment of right femoral supracondylar fracture without intracondylar extension    SURGEON: Terrence Rendon M.D.     ASSISTANT: Jin Quiñonez PA-C    ANESTHESIOLOGIST: Hema Sheehan MD.    ANESTHESIA: General    ESTIMATED BLOOD LOSS: 50 mL    INDICATIONS: The patient is a 61 y.o. female with a right distal femur periprostheticfracture resulting from a ground level fall.  The patient denies antecedent pain, and was found to have a normal neurovascular exam and skin envelope.  Radiographs reviewed by myself demonstrated the distal femur fracture.  Given these findings, surgical treatment of the distal fracture was indicated.  I discussed the risks and benefits of the procedure, including the risks of infection, wound healing complication, neurovascular injury, pain, malunion, non-union, malrotation, and the medical risks of anesthesia including DVT, PE, MI, stroke, and death.  Benefits include early mobilization, improved chance of union, and reduction in the medical risks of femur fractures.  Alternatives to surgery were also discussed, including non-operative management.  The patient signed the informed consent and the operative extremity was marked.      PROCEDURE:  The patient underwent anesthesia, and was positioned supine on a radiolucent table and all bony prominences were well padded.  Preoperative antibiotics were administered. Sequential compression devices were employed. The correct operative site was prepped and draped into a sterile field. A procedural pause was conducted to verify correct patient, correct extremity, presence of the surgeons initials on the operative extremity.    A lateral incision was then made over the distal femur with care taken to avoid all neurovascular structures. Soft tissue stripping was avoided to preserve  blood flow to bone and maximize healing potential. The fracture was reduced using a colinear reduction clamp and a Charles distal femur locking plate was applied with a combination of locking and non-locking screws. Anatomic reduction was obtained. All screws were checked and found to be of appropriate length and out of the joint. Wounds were irrigated with normal saline, and closed in layers, with 1-0 Vicyrl for fascia, 2-0 Vicryl in the subcutaneous tissue, and staples in the skin.  Sterile dressings were applied. A knee immobilizer was applied.    The patient tolerated the procedure well. There were no apparent complications. All sponge, needle, and instrument counts were correct on two separate occasions. She was awakened, extubated, and transferred to the recovery room in satisfactory condition.     The use of Jin Quiñonez as a surgical assistant was necessary for assistance with exposure, retraction, fracture reduction, instrumentation, and closure.    Post-Operative Plan:    1.  The patient should remain toe touch weightbearing on their operative extremity.  Gait aids (crutch or crutches, cane, walker) may be used as needed, and may be discontinued when no longer required.  2.  IV antibiotics - may be continued for 24 hours, but are not required.  3.  DVT prophylaxis - SCD's and Lovenox 40 mg SQ daily while inpatient.  The patient may transition to Aspirin 325 mg PO BID as an outpatient  4.  Discharge planning  ____________________________________   Terrence Rendon M.D.   DD: 11/3/2021  1:04 PM

## 2021-11-03 NOTE — ANESTHESIA POSTPROCEDURE EVALUATION
Patient: Sanam Andre    Procedure Summary     Date: 11/03/21 Room / Location:  OR  / SURGERY [facility]    Anesthesia Start: 1215 Anesthesia Stop: 1314    Procedure: ORIF, FRACTURE, FEMUR - DISTAL (Right Leg Upper) Diagnosis: (distal right femoral fracture)    Surgeons: Terrence Rendon M.D. Responsible Provider: Hema Sheehan M.D.    Anesthesia Type: general ASA Status: 3          Final Anesthesia Type: general  Last vitals  BP   Blood Pressure: 113/70    Temp   37.2 °C (99 °F)    Pulse   91   Resp   16    SpO2   93 %      Anesthesia Post Evaluation    Patient location during evaluation: PACU  Patient participation: complete - patient participated  Level of consciousness: awake and alert  Pain score: 5    Airway patency: patent  Anesthetic complications: no  Cardiovascular status: hemodynamically stable  Respiratory status: acceptable  Hydration status: euvolemic    PONV: none          No complications documented.

## 2021-11-03 NOTE — HOSPITAL COURSE
This is a 61 -year-old female with a past medical history significant for hypertension, LBBB, rheumatoid arthritis, chronic pain, chronic systolic congestive heart failure with EF of 45 %presented to the ER on 11/1/2021 with right lower extremity pain and swelling after a syncopal episode.    She reported that he woke up at 5 in the morning felt lightheaded, lost consciousness. When she woke up she was in extreme pain and not able to stand. X-ray showed Right distal femur periprosthetic fracture with subsided tibial component, Ortho DR Lockett was consulted and joe underwent Open treatment of right femoral supracondylar fracture without intracondylar extension on 11/03.  He did not develop clots per orthopedic surgery recommendation.    Patient is noted to have syncopal episode, echocardiogram was obtained, EF is noted to be 45%, abnormal septal motion consistent with left bundle branch block.  She is noted to have macrocytic anemia along with thrombocytopenia

## 2021-11-03 NOTE — PROGRESS NOTES
Report received from Gary, PACU. Assessed patient, vital signs stable, patient on 10 L O2 via oxymask.

## 2021-11-03 NOTE — PROGRESS NOTES
"Hospital Medicine Daily Progress Note    Date of Service  11/2/2021    Chief Complaint  Sanam Andre is a 61 y.o. female admitted 11/1/2021 with   Chief Complaint   Patient presents with   • Syncope     Pt. reports syncope this AM, \"my vision went and I fell\". Reports she stayed on the ground for approx 9h- \"My daughter heard me go down but I didn't want her to move me\".    • Knee Pain     R knee- +hx arthritis, pain exacerbated by fall. ROM limited d/t pain, circulation and sensation intact.    • Laceration     Small lac to R low eyebrow- bleeding controlled.         Hospital Course  No notes on file    Interval Problem Update  No acute events overnight, laying in a bed   Denied any complain   Continue to complain og pian in her leg   Vitals stable    I have personally seen and examined the patient at bedside. I discussed the plan of care with patient, bedside RN and charge RN.    Consultants/Specialty  orthopedics    Code Status  Full Code    Disposition  Patient is not medically cleared.   Anticipate discharge to to skilled nursing facility.  I have placed the appropriate orders for post-discharge needs.    Review of Systems  Review of Systems   Constitutional: Positive for malaise/fatigue. Negative for chills and fever.   HENT: Negative for congestion and sore throat.    Eyes: Negative for blurred vision and double vision.   Respiratory: Negative for cough, hemoptysis and sputum production.    Cardiovascular: Negative for chest pain, palpitations and orthopnea.   Gastrointestinal: Negative for abdominal pain, heartburn, nausea and vomiting.   Genitourinary: Positive for dysuria.   Musculoskeletal: Negative for joint pain and myalgias.   Neurological: Negative for dizziness and focal weakness.   Psychiatric/Behavioral: Negative for depression. The patient is nervous/anxious.         Physical Exam  Temp:  [36.6 °C (97.9 °F)-37.1 °C (98.7 °F)] 36.6 °C (97.9 °F)  Pulse:  [82-98] 88  Resp:  [16-20] 20  BP: " ()/(62-78) 130/78  SpO2:  [90 %-98 %] 95 %    Physical Exam  Vitals and nursing note reviewed.   Constitutional:       Appearance: Normal appearance.   HENT:      Head: Normocephalic and atraumatic.   Eyes:      Extraocular Movements: Extraocular movements intact.   Cardiovascular:      Rate and Rhythm: Normal rate.      Pulses: Normal pulses.   Pulmonary:      Effort: No respiratory distress.      Breath sounds: Normal breath sounds.   Abdominal:      General: Bowel sounds are normal. There is no distension.      Palpations: Abdomen is soft.      Tenderness: There is no abdominal tenderness.   Musculoskeletal:      Cervical back: Neck supple.      Right lower leg: No edema.   Skin:     General: Skin is warm.   Neurological:      Mental Status: She is alert and oriented to person, place, and time.      Cranial Nerves: No cranial nerve deficit.   Psychiatric:         Mood and Affect: Mood normal.         Fluids    Intake/Output Summary (Last 24 hours) at 11/2/2021 1915  Last data filed at 11/2/2021 1600  Gross per 24 hour   Intake --   Output 500 ml   Net -500 ml       Laboratory  Recent Labs     11/01/21  1550 11/02/21  0202   WBC 8.3 5.9   RBC 3.88* 3.48*   HEMOGLOBIN 13.2 11.7*   HEMATOCRIT 39.2 36.6*   .0* 105.2*   MCH 34.0* 33.6*   MCHC 33.7 32.0*   RDW 46.7 48.0   PLATELETCT 194 158*   MPV 9.6 10.0     Recent Labs     11/01/21  1550 11/02/21  0202   SODIUM 138 141   POTASSIUM 3.5* 3.9   CHLORIDE 100 108   CO2 21 21   GLUCOSE 114* 124*   BUN 33* 24*   CREATININE 1.38 0.95   CALCIUM 10.2 9.4                   Imaging  EC-ECHOCARDIOGRAM COMPLETE W/O CONT   Final Result      DX-HIP-UNILATERAL-WITH PELVIS-1 VIEW RIGHT   Final Result      1.  No acute fracture is identified.      DX-KNEE 2- RIGHT   Final Result   Addendum 1 of 1   Addendum:      Report dictated on the wrong exam.      There is a fracture of the distal right femoral metaphysis above the knee    arthroplasty. There is posterior angulation  of the tibial component. There    is overlying soft tissue edema.      Impression:      Fracture of the distal femoral metaphysis, just superior to the femoral    component of the tricomponent knee arthroplasty      Final      Status post left total knee arthroplasty.      DX-CHEST-PORTABLE (1 VIEW)   Final Result      Medial right basilar opacity may represent early pneumonia or diaphragmatic prominence seen on recent CT. PA and lateral views of the chest may be helpful for further evaluation.      CT-HEAD W/O   Final Result      No acute intracranial abnormality.           Assessment/Plan  * Femoral fracture (HCC)- (present on admission)  Assessment & Plan  Orthopedics [Dr Rendon] consulted,  plan for OR on Wednesday  Multimodal pain control   -- NPO at midnight    Dyslipidemia- (present on admission)  Assessment & Plan  Resume home atorvastatin    Primary hypertension- (present on admission)  Assessment & Plan  Continue lisinopril-HCTZ    DAWSON (acute kidney injury) (HCC)- (present on admission)  Assessment & Plan  Mostly due to dehydration   Improved with IVF       Syncope- (present on admission)  Assessment & Plan  EKG shows a sinus tachycardia with a rate of 92, left bundle branch block that is seen on prior EKGs,   Telemetry monitor  Pending echo      Hx of Methadone dependence (HCC)- (present on admission)  Assessment & Plan  Currently off methadone    Hypokalemia- (present on admission)  Assessment & Plan  Replete and monitor       VTE prophylaxis: enoxaparin ppx

## 2021-11-03 NOTE — OR NURSING
1312- To PACU from OR via bed, sleeping, respirations spontaneous and non-labored via OPA with 6L O2 via mask. Right femur surgical dressing c/d/i. Right leg immobilizer in place. Right pedal pulse +2. RLE cap refill <2 seconds. RLE elevated. SCDs on. LR infusing via PIV.  1327- No changes.  1334- OPA dc'd. Reports no pain or nausea. Returns to sleep.  1342- Reports no pain or nausea. Returns to sleep.  1357- Patient resting quietly. Rouses to voice, reports no pain or nausea.  1412- Patient awakes crying, reports significant pain and tightness to right leg. See MAR.  1421- Patient crying, continues to report pain. See MAR.  1446- Meets criteria for transfer to room. Report given to LYNDSEY Storm.

## 2021-11-03 NOTE — ANESTHESIA PROCEDURE NOTES
Airway    Date/Time: 11/3/2021 12:19 PM  Performed by: Hema Sheehan M.D.  Authorized by: Hema Sheehan M.D.     Location:  OR  Urgency:  Elective  Indications for Airway Management:  Anesthesia      Spontaneous Ventilation: absent    Sedation Level:  Deep  Preoxygenated: Yes    Final Airway Type:  Supraglottic airway  Final Supraglottic Airway:  Standard LMA    SGA Size:  4  Number of Attempts at Approach:  1

## 2021-11-03 NOTE — PROGRESS NOTES
Report received from LYNDSEY Kirby. Pt is resting comfortably, safety precautions in place, and call light within reach. No needs at this time.     Covid-19 surge in effect.

## 2021-11-03 NOTE — ASSESSMENT & PLAN NOTE
Not  On Exacerbation, monitor   EF of 45%, continue cardiac diet, I/os.    Daily weight   Fluid restriction

## 2021-11-03 NOTE — PROGRESS NOTES
Spoke with arthroplasty surgeons who feel patient is too young for distal femur replacement so if possible would like to proceed with ORIF distal femur tomorrow    Once fracture heals, tibial component can be revised which will avoid early revision surgery and better long term outcomes    NPO after midnight

## 2021-11-03 NOTE — PROGRESS NOTES
"[facility] Medicine Daily Progress Note    Date of Service  11/3/2021    Chief Complaint  Sanam Andre is a 61 y.o. female admitted 11/1/2021 with   Chief Complaint   Patient presents with   • Syncope     Pt. reports syncope this AM, \"my vision went and I fell\". Reports she stayed on the ground for approx 9h- \"My daughter heard me go down but I didn't want her to move me\".    • Knee Pain     R knee- +hx arthritis, pain exacerbated by fall. ROM limited d/t pain, circulation and sensation intact.    • Laceration     Small lac to R low eyebrow- bleeding controlled.         Hospital Course  This is a 61 -year-old female with a past medical history significant for hypertension, LBBB, rheumatoid arthritis, chronic pain, chronic systolic congestive heart failure with EF of 45 %presented to the ER on 11/1/2021 with right lower extremity pain and swelling after a syncopal episode.    She reported that he woke up at 5 in the morning felt lightheaded, lost consciousness. When she woke up she was in extreme pain and not able to stand. X-ray showed Right distal femur periprosthetic fracture with subsided tibial component, Ortho DR Lockett was consulted and paient underwent Open treatment of right femoral supracondylar fracture without intracondylar extension on 11/03.  He did not develop clots per orthopedic surgery recommendation.    Patient is noted to have syncopal episode, echocardiogram was obtained, EF is noted to be 45%, abnormal septal motion consistent with left bundle branch block.  She is noted to have macrocytic anemia along with thrombocytopenia    Interval Problem Update  No acute events overnight, laying in a bed   Denied any complain   Continue to complain og pian in her leg   Vitals stable    I have personally seen and examined the patient at bedside. I discussed the plan of care with patient, bedside RN and charge RN.    11/03:  No acute events overnight, vital signs stable.  Patient will be going for " surgery by Dr. Lockett   --CBC tomorrow patient was transferred to [facility], orders placed     consultants/Specialty  orthopedics    Code Status  Full Code    Disposition  Patient is not medically cleared.   Anticipate discharge to to skilled nursing facility.  I have placed the appropriate orders for post-discharge needs.    Review of Systems  Review of Systems   Constitutional: Positive for malaise/fatigue. Negative for chills and fever.   HENT: Negative for congestion and sore throat.    Eyes: Negative for blurred vision and double vision.   Respiratory: Negative for cough, hemoptysis and sputum production.    Cardiovascular: Negative for chest pain, palpitations and orthopnea.   Gastrointestinal: Negative for abdominal pain, heartburn, nausea and vomiting.   Genitourinary: Positive for dysuria.   Musculoskeletal: Negative for joint pain and myalgias.   Neurological: Negative for dizziness and focal weakness.   Psychiatric/Behavioral: Negative for depression. The patient is nervous/anxious.         Physical Exam  Temp:  [36.6 °C (97.9 °F)-37.1 °C (98.7 °F)] 37 °C (98.6 °F)  Pulse:  [] 92  Resp:  [12-20] 14  BP: ()/(59-79) 125/76  SpO2:  [92 %-100 %] 92 %    Physical Exam  Vitals and nursing note reviewed.   Constitutional:       Appearance: Normal appearance.   HENT:      Head: Normocephalic and atraumatic.   Eyes:      Extraocular Movements: Extraocular movements intact.   Cardiovascular:      Rate and Rhythm: Normal rate.      Pulses: Normal pulses.   Pulmonary:      Effort: No respiratory distress.      Breath sounds: Normal breath sounds.   Abdominal:      General: Bowel sounds are normal. There is no distension.      Palpations: Abdomen is soft.      Tenderness: There is no abdominal tenderness.   Musculoskeletal:      Cervical back: Neck supple.      Right lower leg: No edema.   Skin:     General: Skin is warm.   Neurological:      Mental Status: She is alert and oriented to person, place, and time.       Cranial Nerves: No cranial nerve deficit.   Psychiatric:         Mood and Affect: Mood normal.         Fluids    Intake/Output Summary (Last 24 hours) at 11/3/2021 1430  Last data filed at 11/3/2021 1314  Gross per 24 hour   Intake 1600 ml   Output 1450 ml   Net 150 ml       Laboratory  Recent Labs     11/01/21  1550 11/02/21  0202 11/03/21  0500   WBC 8.3 5.9 7.4   RBC 3.88* 3.48* 3.39*   HEMOGLOBIN 13.2 11.7* 11.3*   HEMATOCRIT 39.2 36.6* 34.9*   .0* 105.2* 102.9*   MCH 34.0* 33.6* 33.3*   MCHC 33.7 32.0* 32.4*   RDW 46.7 48.0 46.4   PLATELETCT 194 158* 162*   MPV 9.6 10.0 9.8     Recent Labs     11/01/21  1550 11/02/21  0202 11/03/21  0500   SODIUM 138 141 137   POTASSIUM 3.5* 3.9 5.0   CHLORIDE 100 108 109   CO2 21 21 22   GLUCOSE 114* 124* 90   BUN 33* 24* 18   CREATININE 1.38 0.95 0.68   CALCIUM 10.2 9.4 9.8                   Imaging  DX-FEMUR-2+ RIGHT   Final Result      Intraoperative image(s) as described.      DX-PORTABLE FLUOROSCOPY < 1 HOUR Is the patient pregnant? No   Preliminary Result      Portable fluoroscopy utilized for 34.4 seconds.         INTERPRETING LOCATION: [address] [place] [address], [facility]      EC-ECHOCARDIOGRAM COMPLETE W/O CONT   Final Result      DX-HIP-UNILATERAL-WITH PELVIS-1 VIEW RIGHT   Final Result      1.  No acute fracture is identified.      DX-KNEE 2- RIGHT   Final Result   Addendum 1 of 1   Addendum:      Report dictated on the wrong exam.      There is a fracture of the distal right femoral metaphysis above the knee    arthroplasty. There is posterior angulation of the tibial component. There    is overlying soft tissue edema.      Impression:      Fracture of the distal femoral metaphysis, just superior to the femoral    component of the tricomponent knee arthroplasty      Final      Status post left total knee arthroplasty.      DX-CHEST-PORTABLE (1 VIEW)   Final Result      Medial right basilar opacity may represent early pneumonia or diaphragmatic prominence seen  on recent CT. PA and lateral views of the chest may be helpful for further evaluation.      CT-HEAD W/O   Final Result      No acute intracranial abnormality.           Assessment/Plan  * Femoral fracture (HCC)- (present on admission)  Assessment & Plan  X-ray showed Right distal femur periprosthetic fracture with subsided tibial component, Ortho DR Lockett was consulted and joe underwent Open treatment of right femoral supracondylar fracture without intracondylar extension on 11/03.  He did not develop clots per orthopedic surgery recommendation.      Chronic systolic heart failure (HCC)  Assessment & Plan  EF of 45%, continue cardiac diet, I/os.  IV fluid with caution    Dyslipidemia- (present on admission)  Assessment & Plan  Resume home atorvastatin    Primary hypertension- (present on admission)  Assessment & Plan  Continue lisinopril-HCTZ  Blood pressure well under, continue as needed antihypertensive medication    DAWSON (acute kidney injury) (HCC)- (present on admission)  Assessment & Plan  Mostly due to dehydration   Improved with IVF       Syncope- (present on admission)  Assessment & Plan  Noted to have a left bundle branch block which has been chronic.  Echocardiogram showed EF of 45%, abnormal septal motion consistent with LBBB  Obtain orthostatic vital sign    Thrombocytopenia (HCC)- (present on admission)  Assessment & Plan  At 162, monitor, not actively bleeding, CBC daily    Hx of Methadone dependence (HCC)- (present on admission)  Assessment & Plan  Currently off methadone    Hypokalemia- (present on admission)  Assessment & Plan  Replete and monitor       VTE prophylaxis: enoxaparin ppx        I have performed a physical exam and reviewed and updated ROS and Plan today (11/3/2021). In review of yesterday's note (11/2/2021), there are no changes except as documented above.

## 2021-11-03 NOTE — ANESTHESIA TIME REPORT
Anesthesia Start and Stop Event Times     Date Time Event    11/3/2021 1132 Ready for Procedure     1215 Anesthesia Start     1314 Anesthesia Stop        Responsible Staff  11/03/21    Name Role Begin End    Hema Sheehan M.D. Anesth 1215 1314        Preop Diagnosis (Free Text):  Pre-op Diagnosis     distal right femoral fracture        Preop Diagnosis (Codes):    Premium Reason  Non-Premium    Comments:

## 2021-11-03 NOTE — ANESTHESIA PREPROCEDURE EVALUATION
Relevant Problems   ANESTHESIA (within normal limits)      PULMONARY (within normal limits)      NEURO (within normal limits)      CARDIAC   (positive) LBBB (left bundle branch block)   (positive) Primary hypertension      GI (within normal limits)         (positive) DAWSON (acute kidney injury) (HCC)      ENDO (within normal limits)      Other   (positive) Femoral fracture (HCC)   (positive) Hx of Methadone dependence (HCC)   (positive) Macrocytosis   (positive) Syncope   Syncope and GLF.  Neg head CT.  Unchanged Echo and no events on Tele.  Reported DAWSON on admit poss dehydration, improved.    Physical Exam    Airway   Mallampati: II  TM distance: >3 FB  Neck ROM: full       Cardiovascular - normal exam  Rhythm: regular  Rate: normal  (-) murmur     Dental - normal exam           Pulmonary - normal exam  Breath sounds clear to auscultation     Abdominal    Neurological - normal exam                 Anesthesia Plan    ASA 3   ASA physical status 3 criteria: MI or angina - history (> 3 months)    Plan - general       Airway plan will be LMA          Induction: intravenous    Postoperative Plan: Postoperative administration of opioids is intended.    Pertinent diagnostic labs and testing reviewed    Informed Consent:    Anesthetic plan and risks discussed with patient.    Use of blood products discussed with: patient whom consented to blood products.

## 2021-11-04 ENCOUNTER — HOSPITAL ENCOUNTER (INPATIENT)
Facility: REHABILITATION | Age: 61
End: 2021-11-04
Attending: PHYSICAL MEDICINE & REHABILITATION | Admitting: PHYSICAL MEDICINE & REHABILITATION
Payer: MEDICARE

## 2021-11-04 LAB
ALBUMIN SERPL BCP-MCNC: 2.8 G/DL (ref 3.2–4.9)
BASOPHILS # BLD AUTO: 0.1 % (ref 0–1.8)
BASOPHILS # BLD: 0.01 K/UL (ref 0–0.12)
BUN SERPL-MCNC: 17 MG/DL (ref 8–22)
CALCIUM SERPL-MCNC: 9.1 MG/DL (ref 8.4–10.2)
CHLORIDE SERPL-SCNC: 107 MMOL/L (ref 96–112)
CO2 SERPL-SCNC: 20 MMOL/L (ref 20–33)
CREAT SERPL-MCNC: 0.79 MG/DL (ref 0.5–1.4)
EOSINOPHIL # BLD AUTO: 0 K/UL (ref 0–0.51)
EOSINOPHIL NFR BLD: 0 % (ref 0–6.9)
ERYTHROCYTE [DISTWIDTH] IN BLOOD BY AUTOMATED COUNT: 45.2 FL (ref 35.9–50)
GLUCOSE SERPL-MCNC: 149 MG/DL (ref 65–99)
HCT VFR BLD AUTO: 31.7 % (ref 37–47)
HGB BLD-MCNC: 10.6 G/DL (ref 12–16)
IMM GRANULOCYTES # BLD AUTO: 0.03 K/UL (ref 0–0.11)
IMM GRANULOCYTES NFR BLD AUTO: 0.4 % (ref 0–0.9)
LYMPHOCYTES # BLD AUTO: 0.78 K/UL (ref 1–4.8)
LYMPHOCYTES NFR BLD: 9.4 % (ref 22–41)
MCH RBC QN AUTO: 34.2 PG (ref 27–33)
MCHC RBC AUTO-ENTMCNC: 33.4 G/DL (ref 33.6–35)
MCV RBC AUTO: 102.3 FL (ref 81.4–97.8)
MONOCYTES # BLD AUTO: 0.49 K/UL (ref 0–0.85)
MONOCYTES NFR BLD AUTO: 5.9 % (ref 0–13.4)
NEUTROPHILS # BLD AUTO: 6.96 K/UL (ref 2–7.15)
NEUTROPHILS NFR BLD: 84.2 % (ref 44–72)
NRBC # BLD AUTO: 0 K/UL
NRBC BLD-RTO: 0 /100 WBC
PHOSPHATE SERPL-MCNC: 3.2 MG/DL (ref 2.5–4.5)
PLATELET # BLD AUTO: 171 K/UL (ref 164–446)
PMV BLD AUTO: 10.1 FL (ref 9–12.9)
POTASSIUM SERPL-SCNC: 5.2 MMOL/L (ref 3.6–5.5)
RBC # BLD AUTO: 3.1 M/UL (ref 4.2–5.4)
SODIUM SERPL-SCNC: 137 MMOL/L (ref 135–145)
WBC # BLD AUTO: 8.3 K/UL (ref 4.8–10.8)

## 2021-11-04 PROCEDURE — 700111 HCHG RX REV CODE 636 W/ 250 OVERRIDE (IP): Performed by: ORTHOPAEDIC SURGERY

## 2021-11-04 PROCEDURE — 700102 HCHG RX REV CODE 250 W/ 637 OVERRIDE(OP): Performed by: HOSPITALIST

## 2021-11-04 PROCEDURE — 97166 OT EVAL MOD COMPLEX 45 MIN: CPT

## 2021-11-04 PROCEDURE — 85025 COMPLETE CBC W/AUTO DIFF WBC: CPT

## 2021-11-04 PROCEDURE — 36415 COLL VENOUS BLD VENIPUNCTURE: CPT

## 2021-11-04 PROCEDURE — 97162 PT EVAL MOD COMPLEX 30 MIN: CPT

## 2021-11-04 PROCEDURE — 99232 SBSQ HOSP IP/OBS MODERATE 35: CPT | Performed by: HOSPITALIST

## 2021-11-04 PROCEDURE — 770006 HCHG ROOM/CARE - MED/SURG/GYN SEMI*

## 2021-11-04 PROCEDURE — A9270 NON-COVERED ITEM OR SERVICE: HCPCS | Performed by: HOSPITALIST

## 2021-11-04 PROCEDURE — 80069 RENAL FUNCTION PANEL: CPT

## 2021-11-04 PROCEDURE — 700101 HCHG RX REV CODE 250: Performed by: HOSPITALIST

## 2021-11-04 PROCEDURE — 700111 HCHG RX REV CODE 636 W/ 250 OVERRIDE (IP): Performed by: HOSPITALIST

## 2021-11-04 PROCEDURE — 700102 HCHG RX REV CODE 250 W/ 637 OVERRIDE(OP): Performed by: ORTHOPAEDIC SURGERY

## 2021-11-04 PROCEDURE — 99024 POSTOP FOLLOW-UP VISIT: CPT | Performed by: ORTHOPAEDIC SURGERY

## 2021-11-04 PROCEDURE — A9270 NON-COVERED ITEM OR SERVICE: HCPCS | Performed by: ORTHOPAEDIC SURGERY

## 2021-11-04 RX ADMIN — DOCUSATE SODIUM 100 MG: 100 CAPSULE, LIQUID FILLED ORAL at 05:35

## 2021-11-04 RX ADMIN — MORPHINE SULFATE 4 MG: 4 INJECTION INTRAVENOUS at 04:04

## 2021-11-04 RX ADMIN — POLYETHYLENE GLYCOL 3350 1 PACKET: 17 POWDER, FOR SOLUTION ORAL at 05:37

## 2021-11-04 RX ADMIN — OXYCODONE HYDROCHLORIDE 10 MG: 10 TABLET ORAL at 13:07

## 2021-11-04 RX ADMIN — OXYCODONE HYDROCHLORIDE 10 MG: 10 TABLET ORAL at 20:13

## 2021-11-04 RX ADMIN — GABAPENTIN 300 MG: 300 CAPSULE ORAL at 05:35

## 2021-11-04 RX ADMIN — OXYCODONE HYDROCHLORIDE 10 MG: 10 TABLET ORAL at 09:29

## 2021-11-04 RX ADMIN — DOCUSATE SODIUM 100 MG: 100 CAPSULE, LIQUID FILLED ORAL at 17:09

## 2021-11-04 RX ADMIN — ACETAMINOPHEN 650 MG: 325 TABLET, FILM COATED ORAL at 17:09

## 2021-11-04 RX ADMIN — ATORVASTATIN CALCIUM 40 MG: 40 TABLET, FILM COATED ORAL at 17:09

## 2021-11-04 RX ADMIN — MORPHINE SULFATE 4 MG: 4 INJECTION INTRAVENOUS at 01:00

## 2021-11-04 RX ADMIN — GABAPENTIN 300 MG: 300 CAPSULE ORAL at 12:19

## 2021-11-04 RX ADMIN — OXYCODONE HYDROCHLORIDE 10 MG: 10 TABLET ORAL at 17:09

## 2021-11-04 RX ADMIN — CEFAZOLIN 2 G: 1 INJECTION, POWDER, FOR SOLUTION INTRAVENOUS at 01:01

## 2021-11-04 RX ADMIN — GABAPENTIN 600 MG: 300 CAPSULE ORAL at 20:13

## 2021-11-04 RX ADMIN — LISINOPRIL 5 MG: 5 TABLET ORAL at 05:35

## 2021-11-04 RX ADMIN — OXYCODONE HYDROCHLORIDE 10 MG: 10 TABLET ORAL at 05:35

## 2021-11-04 RX ADMIN — POTASSIUM CHLORIDE AND SODIUM CHLORIDE: 900; 150 INJECTION, SOLUTION INTRAVENOUS at 01:12

## 2021-11-04 RX ADMIN — MORPHINE SULFATE 7.5 MG: 15 TABLET ORAL at 08:10

## 2021-11-04 RX ADMIN — ACETAMINOPHEN 650 MG: 325 TABLET, FILM COATED ORAL at 12:19

## 2021-11-04 ASSESSMENT — ENCOUNTER SYMPTOMS
FEVER: 0
ORTHOPNEA: 0
CHILLS: 0
PALPITATIONS: 0
HEMOPTYSIS: 0
HEARTBURN: 0
SPUTUM PRODUCTION: 0
COUGH: 0
DOUBLE VISION: 0
DEPRESSION: 0
NERVOUS/ANXIOUS: 1
SORE THROAT: 0
DIZZINESS: 0
ABDOMINAL PAIN: 0
FOCAL WEAKNESS: 0
NAUSEA: 0
BLURRED VISION: 0
VOMITING: 0
MYALGIAS: 0

## 2021-11-04 ASSESSMENT — COGNITIVE AND FUNCTIONAL STATUS - GENERAL
DAILY ACTIVITIY SCORE: 16
CLIMB 3 TO 5 STEPS WITH RAILING: TOTAL
PERSONAL GROOMING: A LITTLE
SUGGESTED CMS G CODE MODIFIER MOBILITY: CM
SUGGESTED CMS G CODE MODIFIER DAILY ACTIVITY: CK
MOVING TO AND FROM BED TO CHAIR: A LOT
DRESSING REGULAR UPPER BODY CLOTHING: A LITTLE
DRESSING REGULAR LOWER BODY CLOTHING: A LOT
WALKING IN HOSPITAL ROOM: TOTAL
STANDING UP FROM CHAIR USING ARMS: TOTAL
MOVING FROM LYING ON BACK TO SITTING ON SIDE OF FLAT BED: UNABLE
MOBILITY SCORE: 9
TURNING FROM BACK TO SIDE WHILE IN FLAT BAD: A LITTLE
TOILETING: A LOT
HELP NEEDED FOR BATHING: A LOT

## 2021-11-04 ASSESSMENT — ACTIVITIES OF DAILY LIVING (ADL): TOILETING: INDEPENDENT

## 2021-11-04 ASSESSMENT — PAIN DESCRIPTION - PAIN TYPE
TYPE: SURGICAL PAIN
TYPE: SURGICAL PAIN
TYPE: ACUTE PAIN;SURGICAL PAIN
TYPE: SURGICAL PAIN
TYPE: SURGICAL PAIN;ACUTE PAIN
TYPE: ACUTE PAIN;SURGICAL PAIN
TYPE: ACUTE PAIN;SURGICAL PAIN
TYPE: SURGICAL PAIN
TYPE: SURGICAL PAIN
TYPE: SURGICAL PAIN;ACUTE PAIN
TYPE: ACUTE PAIN;SURGICAL PAIN
TYPE: ACUTE PAIN;SURGICAL PAIN

## 2021-11-04 ASSESSMENT — GAIT ASSESSMENTS
DISTANCE (FEET): 0
GAIT LEVEL OF ASSIST: UNABLE TO PARTICIPATE

## 2021-11-04 NOTE — DISCHARGE PLANNING
[facility] Transitional Care Coordination    Referral from:  Dr. Zuñiga    Insurance Provider on Facesheet: MCR/MATT FFS    Potential Rehab Diagnosis: Right distal femur periprosthetic fracture.    Chart review indicates patient may have on going medical management and may have therapy needs to possibly meet inpatient rehab facility criteria with the goal of returning to community.    D/C support: TBD     Physiatry consultation pended per protocol.     Right distal femur periprosthetic fracture.  S/p Open treatment of right femoral supracondylar fracture without intracondylar extension.  Will need distal femur fracture healed before revising loose knee replacement. TTWB RLE, Knee immobilizer at all times.  Would appreciate TX evals once appropriate.  Waiting on additional information to determine appropriateness for acute inpatient rehabilitation. Will continue to follow.      Thank you for the referral.

## 2021-11-04 NOTE — PROGRESS NOTES
"ORIF distal femur yesterday  Will need distal femur fracture healed before revising loose knee replacement     /74   Pulse 94   Temp 36.3 °C (97.3 °F) (Oral)   Resp 16   Ht 1.549 m (5' 1\")   Wt 49.6 kg (109 lb 5.6 oz)   SpO2 98%     Recent Labs     11/02/21  0202 11/03/21  0500 11/04/21  0233   WBC 5.9 7.4 8.3   RBC 3.48* 3.39* 3.10*   HEMOGLOBIN 11.7* 11.3* 10.6*   HEMATOCRIT 36.6* 34.9* 31.7*   .2* 102.9* 102.3*   MCH 33.6* 33.3* 34.2*   MCHC 32.0* 32.4* 33.4*   RDW 48.0 46.4 45.2   PLATELETCT 158* 162* 171   MPV 10.0 9.8 10.1         POD#1   S/P ORIF distal femur    Plan:  DVT Prophylaxis- TEDS/SCDs, lovenox while in hospital, ASA 81 mg PO BID as outpatient  Weight Bearing Status-TTWB RLE, Knee immobilizer at all times  PT/OT  Antibiotics: 24 hrs post op  Case Coordination          "

## 2021-11-04 NOTE — DISCHARGE PLANNING
Current documentation reveals a potential for acute inpatient rehabilitation, please consider a PMR referral to assist with discharge planning. Msg placed to Dr. Zuñiga.

## 2021-11-04 NOTE — THERAPY
Physical Therapy   Initial Evaluation     Patient Name: Sanam Andre  Age:  61 y.o., Sex:  female  Medical Record #: [id number]  Today's Date: 11/4/2021     Precautions  Precautions: (P) Fall Risk;Toe Touch Weight Bearing Right Lower Extremity;Immobilizer Right Lower Extremity  Comments: (P) No ROM at R knee    Assessment  Patient is 61 y.o. female who was recently admitted for syncope and GLF and presented with R periprosthetic distal femur fracture and loosing of total knee components. Per medical chart plan is to perform revision of total knee after healing of fracture. Pt is ordered to be TTWB for R LE and no ROM at R knee with immobilize on at all times. Pt presented to PT with impaired balance, pain at R knee and chronic pain at L knee, weakness, and dec activity tolerance. Pt was unable to demonstrate appropriate standing at this time due to chronic L knee pain. Pt may benefit from using SB transfer at this time to w/c to promotor functional mobility. Will plan on SB training and w/c transfer next session. Recommend post-acute placement for continued physical therapy services prior to discharge home.          Plan    Recommend Physical Therapy 3 times per week until therapy goals are met for the following treatments:  Bed Mobility, Community Re-integration, Equipment, Gait Training, Manual Therapy, Neuro Re-Education / Balance, Self Care/Home Evaluation, Stair Training, Therapeutic Activities and Therapeutic Exercises    DC Equipment Recommendations: (P) Unable to determine at this time  Discharge Recommendations: (P) Recommend post-acute placement for additional physical therapy services prior to discharge home     Objective       11/04/21 1248   Initial Contact Note    Initial Contact Note Order Received and Verified, Physical Therapy Evaluation in Progress with Full Report to Follow.   Precautions   Precautions Fall Risk;Toe Touch Weight Bearing Right Lower Extremity;Immobilizer Right Lower Extremity    Comments No ROM at R knee   Vitals   O2 (LPM) 4   O2 Delivery Device Nasal Cannula   Pain 0 - 10 Group   Location Leg   Location Orientation Right   Description Pressure;Sharp;Aching;Tender   Therapist Pain Assessment Prior to Activity;During Activity;Post Activity;Nurse Notified;7   Prior Living Situation   Prior Services None   Housing / Facility 2 Story House   Steps Into Home 0   Steps In Home   (flight of stairs in home )   Rail Both Rail (Steps into Home)   Equipment Owned Single Point Cane   Lives with - Patient's Self Care Capacity Adult Children;Other (Comments)  (son in law)   Comments states she was primairly I prior GLF    Prior Level of Functional Mobility   Bed Mobility Independent   Transfer Status Independent   Ambulation Independent   Distance Ambulation (Feet)   (community )   Assistive Devices Used Single Point Cane   Stairs Independent   Comments reports of using SPC at all times    History of Falls   History of Falls Yes   Date of Last Fall   (reason for admit)   Cognition    Cognition / Consciousness WDL   Level of Consciousness Alert   Comments appears slightly anxious    Passive ROM Lower Body   Passive ROM Lower Body X   Comments no ROM at R knee    Active ROM Lower Body    Active ROM Lower Body  X   Comments same as above    Strength Lower Body   Lower Body Strength  X   Comments limited in R LE due to pain; limited also in L LE due to chronic knee pain; unable to demo functional strength for standing at L LE at this time   Sensation Lower Body   Lower Extremity Sensation   WDL   Lower Body Muscle Tone   Lower Body Muscle Tone  WDL   Strength Upper Body   Upper Body Strength  WDL   Upper Body Muscle Tone   Upper Body Muscle Tone  WDL   Neurological Concerns   Neurological Concerns No   Coordination Lower Body    Coordination Lower Body  WDL   Balance Assessment   Sitting Balance (Static) Fair +   Sitting Balance (Dynamic) Fair +   Standing Balance (Static) Trace   Standing Balance  (Dynamic) Dependent   Weight Shift Sitting Fair   Weight Shift Standing Absent   Comments able to stand partially with FWW and Min A from therapist, unable to demo functional standing posture ; poor WB tolerance on L LE due to chronic pain    Gait Analysis   Gait Level Of Assist Unable to Participate   Weight Bearing Status TTWB R LE    Bed Mobility    Supine to Sit Minimal Assist   Sit to Supine Minimal Assist   Scooting Minimal Assist   Rolling Minimal Assist to Rt.;Minimum Assist to Lt.   Comments HOB elevated and rails up; primarily requires assist with R LE    Functional Mobility   Sit to Stand Moderate Assist   Bed, Chair, Wheelchair Transfer Unable to Participate   Toilet Transfers Unable to Participate   Mobility EOB, sit<>stand, scooting along side EOB, back to bed    Comments cues for handplacement and R LE placement upon standing    How much difficulty does the patient currently have...   Turning over in bed (including adjusting bedclothes, sheets and blankets)? 3   Sitting down on and standing up from a chair with arms (e.g., wheelchair, bedside commode, etc.) 1   Moving from lying on back to sitting on the side of the bed? 2   How much help from another person does the patient currently need...   Moving to and from a bed to a chair (including a wheelchair)? 1   Need to walk in a hospital room? 1   Climbing 3-5 steps with a railing? 1   6 clicks Mobility Score 9   Activity Tolerance   Sitting in Chair NT   Sitting Edge of Bed 10 mins   Standing 45 seconds    Comments limited due to pain    Edema / Skin Assessment   Edema / Skin  Not Assessed   Patient / Family Goals    Patient / Family Goal #1 to go back to PLOF    Short Term Goals    Short Term Goal # 1 pt will go supine<>sit w/hob elevated and rails up w/spv in 6tx    Short Term Goal # 2 pt will go sit<>stand w/fww w/Min A in 6tx    Short Term Goal # 3 pt will transfer bed<>chair w/SB w/spv in 6tx for meals    Short Term Goal # 4 pt will mobilize in  w/c for 100ft w/spv in 6tx    Education Group   Education Provided Role of Physical Therapist;Weight Bearing Status;Weight Bearing Precautions   Role of Physical Therapist Patient Response Patient;Acceptance;Explanation;Demonstration;Verbal Demonstration;Action Demonstration   Weight Bearing Status Patient Response Patient;Acceptance;Explanation;Demonstration;Verbal Demonstration;Action Demonstration   Problem List    Problems Pain;Impaired Bed Mobility;Impaired Transfers;Impaired Ambulation;Functional Strength Deficit;Functional ROM Deficit;Impaired Balance;Decreased Activity Tolerance   Anticipated Discharge Equipment and Recommendations   DC Equipment Recommendations Unable to determine at this time   Discharge Recommendations Recommend post-acute placement for additional physical therapy services prior to discharge home   Interdisciplinary Plan of Care Collaboration   IDT Collaboration with  Nursing   Patient Position at End of Therapy In Bed;Call Light within Reach;Tray Table within Reach;Phone within Reach   Collaboration Comments aware of visit and recs    Session Information   Date / Session Number  11/4-1 (1/3, 11/10)

## 2021-11-04 NOTE — DISCHARGE PLANNING
Anticipated Discharge Disposition: Home     Action: Pt discussed during morning rounds. Per Dr. Zuñiga pt anticipated to d/c today. Pt has a six clicks score of 19. Pt pending PT and OT evals.     Barriers to Discharge: None     Plan: Await PT and OT recommendations, LSW to continue to follow for d/c needs       Care Transition Team Assessment    Information Source  Orientation Level: Oriented X4  Information Given By: Other (Comments)  Who is responsible for making decisions for patient? : Patient    Elopement Risk  Legal Hold: No  Ambulatory or Self Mobile in Wheelchair: Yes  Disoriented: No  Psychiatric Symptoms: None  History of Wandering: No  Elopement this Admit: No  Vocalizing Wanting to Leave: No  Displays Behaviors, Body Language Wanting to Leave: No-Not at Risk for Elopement  Elopement Risk: Not at Risk for Elopement    Discharge Preparedness  What is your plan after discharge?: Uncertain - pending medical team collaboration  What are your discharge supports?: Child, Sibling  Prior Functional Level: Independent with Activities of Daily Living, Independent with Medication Management    Functional Assesment  Prior Functional Level: Independent with Activities of Daily Living, Independent with Medication Management    Finances  Financial Barriers to Discharge: No  Prescription Coverage: Yes    Vision / Hearing Impairment  Right Eye Vision: Wears Glasses  Left Eye Vision: Wears Glasses    Advance Directive  Advance Directive?: None    Psychological Assessment  History of Substance Abuse: None  History of Psychiatric Problems: No    Discharge Risks or Barriers  Discharge risks or barriers?: No    Anticipated Discharge Information  Discharge Disposition: Still a Patient (30)

## 2021-11-04 NOTE — THERAPY
Occupational Therapy   Initial Evaluation     Patient Name: Sanam Andre  Age:  61 y.o., Sex:  female  Medical Record #: [id number]  Today's Date: 11/4/2021     Precautions  Precautions: Toe Touch Weight Bearing Right Lower Extremity, Immobilizer Right Lower Extremity    Assessment  Patient is 61 y.o. female with a diagnosis of syncope and fall, sustaining R distal femur fx and loosening of TKA hardware.  S/P ORIF to R distal femur.  Pt is in knee immobilzer full time and TTWB.  She also has RA and severe OA in L knee, with difficulty mobilizing and WB on LLE prior to this injury.  Pt also challenged with living in 2nd story apt which is essentially inaccessible to her at this time.  Currently she is able to move fairly well in bed, tolerated EOB sitting briefly.  Attempted stand with FWW but LLE too painful and not strong enough to support her.  Pt may benefit from learning slideboard transfers and goot scooting technique for mode of transportation and transfers.  She is max/total assist for most ADl's at this time.  Pt is unsafe for home, will benefit from further inpt post acute therapy prior to home.  OT will follow while in house.      Plan    Recommend Occupational Therapy 3 times per week until therapy goals are met for the following treatments:  Adaptive Equipment, Neuro Re-Education / Balance, Self Care/Activities of Daily Living, Therapeutic Activities and Therapeutic Exercises.    DC Equipment Recommendations: Unable to determine at this time  Discharge Recommendations: Recommend post-acute placement for additional occupational therapy services prior to discharge home        11/04/21 1243   Prior Living Situation   Prior Services Home-Independent   Housing / Facility 2 Story Apartment / Condo   Steps Into Home   (1 flight)   Bathroom Set up Bathtub / Shower Combination   Equipment Owned Single Point Cane   Lives with - Patient's Self Care Capacity Adult Children   Comments Lives with Dtr and SUSANNE and  grandkids.  2nd floor apt, no elevator.  No friends/fam avail that live on 1st floor   Prior Level of ADL Function   Self Feeding Independent   Grooming / Hygiene Independent   Bathing Independent   Dressing Independent   Toileting Independent   Prior Level of IADL Function   Medication Management Independent   Laundry Independent   Kitchen Mobility Independent   Finances Independent   Home Management Independent   Shopping Independent   Prior Level Of Mobility Independent Without Device in Community;Independent With Device in Home   Driving / Transportation Unable To Determine At This Time   Occupation (Pre-Hospital Vocational) Retired Due To Age   Leisure Interests Family   Comments helps with grandkids,    History of Falls   History of Falls Yes   Date of Last Fall 11/01/21  (syncope and fall)   Precautions   Precautions Toe Touch Weight Bearing Right Lower Extremity;Immobilizer Right Lower Extremity   Vitals   O2 Delivery Device None - Room Air   Pain 0 - 10 Group   Location Leg   Location Orientation Right   Description Aching;Sore   Therapist Pain Assessment 6;During Activity;Nurse Notified   Cognition    Cognition / Consciousness X   Level of Consciousness Alert   Comments Anxious, impulsive   Active ROM Upper Body   Active ROM Upper Body  WDL   Dominant Hand Right   Strength Upper Body   Upper Body Strength  WDL   Comments demos good UB strength for scooting and bed mobility   Balance Assessment   Sitting Balance (Static) Fair +   Sitting Balance (Dynamic) Fair +   Standing Balance (Static) Trace   Standing Balance (Dynamic)   (u/a to test)   Weight Shift Sitting Fair   Weight Shift Standing Absent   Bed Mobility    Supine to Sit Minimal Assist   Sit to Supine Minimal Assist   Scooting Minimal Assist   Rolling Minimal Assist to Rt.   ADL Assessment   Eating Independent   Grooming Modified Independent   Upper Body Dressing Minimal Assist   Lower Body Dressing Maximal Assist   Toileting Maximal Assist   How  much help from another person does the patient currently need...   Putting on and taking off regular lower body clothing? 2   Bathing (including washing, rinsing, and drying)? 2   Toileting, which includes using a toilet, bedpan, or urinal? 2   Putting on and taking off regular upper body clothing? 3   Taking care of personal grooming such as brushing teeth? 3   Eating meals? 4   6 Clicks Daily Activity Score 16   Functional Mobility   Sit to Stand Moderate Assist  (to partial stand)   Bed, Chair, Wheelchair Transfer Unable to Participate   Toilet Transfers Unable to Participate   Tub / Shower Transfers Unable to Participate   Mobility partial stand 15 sec at bedside   Distance (Feet) 0   Comments unable to tolerate standing due to severe arthritis in LLE with pain   Activity Tolerance   Sitting Edge of Bed 8 min   Standing 15 sec   Patient / Family Goals   Patient / Family Goal #1 rehab   Short Term Goals   Short Term Goal # 1 pt will dress LB from bed level with Che[employer] and AE in 6 visits   Short Term Goal # 2 Pt will SB transfer to chair for meals with modA in 5 visits   Short Term Goal # 3 Pt will toilet on BSC with Cheko in 6 visits

## 2021-11-04 NOTE — CARE PLAN
The patient is Stable - Low risk of patient condition declining or worsening    Shift Goals  Clinical Goals: pain management   Patient Goals: Pain management     Progress made toward(s) clinical / shift goals:    Problem: Pain - Standard  Goal: Alleviation of pain or a reduction in pain to the patient’s comfort goal  Outcome: Progressing  Note: Pain managed per MAR        Problem: Knowledge Deficit - Standard  Goal: Patient and family/care givers will demonstrate understanding of plan of care, disease process/condition, diagnostic tests and medications  Outcome: Progressing  Note: Reviewed plan of care with pt, pt verbalized understanding, no questions at this time.      Problem: Skin Integrity  Goal: Skin integrity is maintained or improved  Outcome: Progressing       Patient is not progressing towards the following goals:

## 2021-11-04 NOTE — PROGRESS NOTES
Report given to LYNDSEY Frye. Patient transferred to [facility] via transport staff on 5L [place].

## 2021-11-04 NOTE — PROGRESS NOTES
Received report from Dotty at 1827. Gave report to receiving night shift nurse Cal RN at 1843 questions encouraged and answered. Pt to floor at 1848.

## 2021-11-04 NOTE — PROGRESS NOTES
"[facility] Medicine Daily Progress Note    Date of Service  11/4/2021    Chief Complaint  Sanam Andre is a 61 y.o. female admitted 11/1/2021 with   Chief Complaint   Patient presents with   • Syncope     Pt. reports syncope this AM, \"my vision went and I fell\". Reports she stayed on the ground for approx 9h- \"My daughter heard me go down but I didn't want her to move me\".    • Knee Pain     R knee- +hx arthritis, pain exacerbated by fall. ROM limited d/t pain, circulation and sensation intact.    • Laceration     Small lac to R low eyebrow- bleeding controlled.         Hospital Course  This is a 61 -year-old female with a past medical history significant for hypertension, LBBB, rheumatoid arthritis, chronic pain, chronic systolic congestive heart failure with EF of 45 %presented to the ER on 11/1/2021 with right lower extremity pain and swelling after a syncopal episode.    She reported that he woke up at 5 in the morning felt lightheaded, lost consciousness. When she woke up she was in extreme pain and not able to stand. X-ray showed Right distal femur periprosthetic fracture with subsided tibial component, Ortho DR Lockett was consulted and paient underwent Open treatment of right femoral supracondylar fracture without intracondylar extension on 11/03.  He did not develop clots per orthopedic surgery recommendation.    Patient is noted to have syncopal episode, echocardiogram was obtained, EF is noted to be 45%, abnormal septal motion consistent with left bundle branch block.  She is noted to have macrocytic anemia along with thrombocytopenia    Interval Problem Update  No acute events overnight, laying in a bed   Denied any complain   Continue to complain og pian in her leg   Vitals stable    I have personally seen and examined the patient at bedside. I discussed the plan of care with patient, bedside RN and charge RN.    11/03:  No acute events overnight, vital signs stable.  Patient will be going for " surgery by Dr. Lockett   --CBC tomorrow patient was transferred to [facility], orders placed    11/04 no acute events overnight, laying in bed continuing to complain. Hemoglobin hematocrit has remained stable, surgery following. PT/OT has evaluated, recommend postacute. Physiatry referral placed with the patient, nurse, case management.     consultants/Specialty  orthopedics    Code Status  Full Code    Disposition  Patient is not medically cleared.   Anticipate discharge to to skilled nursing facility.  I have placed the appropriate orders for post-discharge needs.    Review of Systems  Review of Systems   Constitutional: Positive for malaise/fatigue. Negative for chills and fever.   HENT: Negative for congestion and sore throat.    Eyes: Negative for blurred vision and double vision.   Respiratory: Negative for cough, hemoptysis and sputum production.    Cardiovascular: Negative for chest pain, palpitations and orthopnea.   Gastrointestinal: Negative for abdominal pain, heartburn, nausea and vomiting.   Genitourinary: Positive for dysuria.   Musculoskeletal: Negative for joint pain (Knee) and myalgias.   Neurological: Negative for dizziness and focal weakness.   Psychiatric/Behavioral: Negative for depression. The patient is nervous/anxious.         Physical Exam  Temp:  [36.3 °C (97.3 °F)-36.8 °C (98.2 °F)] 36.7 °C (98.1 °F)  Pulse:  [] 88  Resp:  [16-17] 17  BP: ()/(46-85) 125/77  SpO2:  [88 %-100 %] 95 %    Physical Exam  Vitals and nursing note reviewed.   Constitutional:       Appearance: Normal appearance.   HENT:      Head: Normocephalic and atraumatic.   Eyes:      Extraocular Movements: Extraocular movements intact.   Cardiovascular:      Rate and Rhythm: Normal rate.      Pulses: Normal pulses.   Pulmonary:      Effort: No respiratory distress.      Breath sounds: Normal breath sounds.   Abdominal:      General: Bowel sounds are normal. There is no distension.      Palpations: Abdomen is soft.       Tenderness: There is no abdominal tenderness.   Musculoskeletal:      Cervical back: Neck supple.      Right lower leg: No edema.      Comments: Decreased range of motion secondary to pain on the right side   Skin:     General: Skin is warm.   Neurological:      Mental Status: She is alert and oriented to person, place, and time.      Cranial Nerves: No cranial nerve deficit.   Psychiatric:         Mood and Affect: Mood normal.         Fluids    Intake/Output Summary (Last 24 hours) at 11/4/2021 1517  Last data filed at 11/4/2021 0900  Gross per 24 hour   Intake 360 ml   Output --   Net 360 ml       Laboratory  Recent Labs     11/02/21  0202 11/03/21  0500 11/04/21  0233   WBC 5.9 7.4 8.3   RBC 3.48* 3.39* 3.10*   HEMOGLOBIN 11.7* 11.3* 10.6*   HEMATOCRIT 36.6* 34.9* 31.7*   .2* 102.9* 102.3*   MCH 33.6* 33.3* 34.2*   MCHC 32.0* 32.4* 33.4*   RDW 48.0 46.4 45.2   PLATELETCT 158* 162* 171   MPV 10.0 9.8 10.1     Recent Labs     11/02/21  0202 11/03/21  0500 11/04/21  0233   SODIUM 141 137 137   POTASSIUM 3.9 5.0 5.2   CHLORIDE 108 109 107   CO2 21 22 20   GLUCOSE 124* 90 149*   BUN 24* 18 17   CREATININE 0.95 0.68 0.79   CALCIUM 9.4 9.8 9.1                   Imaging  DX-FEMUR-2+ RIGHT   Final Result      Intraoperative image(s) as described.      DX-PORTABLE FLUOROSCOPY < 1 HOUR Is the patient pregnant? No   Final Result      Portable fluoroscopy utilized for 34.4 seconds.         INTERPRETING LOCATION: [address] [place] [address], [address]      EC-ECHOCARDIOGRAM COMPLETE W/O CONT   Final Result      DX-HIP-UNILATERAL-WITH PELVIS-1 VIEW RIGHT   Final Result      1.  No acute fracture is identified.      DX-KNEE 2- RIGHT   Final Result   Addendum 1 of 1   Addendum:      Report dictated on the wrong exam.      There is a fracture of the distal right femoral metaphysis above the knee    arthroplasty. There is posterior angulation of the tibial component. There    is overlying soft tissue edema.      Impression:       Fracture of the distal femoral metaphysis, just superior to the femoral    component of the tricomponent knee arthroplasty      Final      Status post left total knee arthroplasty.      DX-CHEST-PORTABLE (1 VIEW)   Final Result      Medial right basilar opacity may represent early pneumonia or diaphragmatic prominence seen on recent CT. PA and lateral views of the chest may be helpful for further evaluation.      CT-HEAD W/O   Final Result      No acute intracranial abnormality.           Assessment/Plan  * Femoral fracture (HCC)- (present on admission)  Assessment & Plan  X-ray showed Right distal femur periprosthetic fracture with subsided tibial component, Ortho DR Lockett was consulted and joe underwent Open treatment of right femoral supracondylar fracture without intracondylar extension on 11/03.  , PT/OT has recommended postacute, physiatry referral in place    Chronic systolic heart failure (HCC)  Assessment & Plan  EF of 45%, continue cardiac diet, I/os.  IV fluid with caution    Dyslipidemia- (present on admission)  Assessment & Plan  Resume home atorvastatin    Primary hypertension- (present on admission)  Assessment & Plan  Continue lisinopril-HCTZ  Blood pressure well under, continue as needed antihypertensive medication    DAWSON (acute kidney injury) (HCC)- (present on admission)  Assessment & Plan  Mostly due to dehydration   Improved with IVF   Avoid nephrotoxin    Syncope- (present on admission)  Assessment & Plan  Noted to have a left bundle branch block which has been chronic.  Echocardiogram showed EF of 45%, abnormal septal motion consistent with LBBB  Telemetry monitoring showed bradycardia, left cardiovascular, no obvious arrhythmia noted    Thrombocytopenia (HCC)- (present on admission)  Assessment & Plan  Resolved, today at 171, monitor    Hx of Methadone dependence (HCC)- (present on admission)  Assessment & Plan  Currently off methadone    Hypokalemia- (present on  admission)  Assessment & Plan  Replete and monitor       VTE prophylaxis: enoxaparin ppx        I have performed a physical exam and reviewed and updated ROS and Plan today (11/4/2021). In review of yesterday's note (11/3/2021), there are no changes except as documented above.

## 2021-11-05 ENCOUNTER — APPOINTMENT (OUTPATIENT)
Dept: RADIOLOGY | Facility: MEDICAL CENTER | Age: 61
DRG: 481 | End: 2021-11-05
Attending: HOSPITALIST
Payer: MEDICARE

## 2021-11-05 ENCOUNTER — PATIENT OUTREACH (OUTPATIENT)
Dept: HEALTH INFORMATION MANAGEMENT | Facility: OTHER | Age: 61
End: 2021-11-05

## 2021-11-05 LAB
ALBUMIN SERPL BCP-MCNC: 2.9 G/DL (ref 3.2–4.9)
BASOPHILS # BLD AUTO: 0.3 % (ref 0–1.8)
BASOPHILS # BLD: 0.03 K/UL (ref 0–0.12)
BUN SERPL-MCNC: 19 MG/DL (ref 8–22)
CALCIUM SERPL-MCNC: 9.3 MG/DL (ref 8.4–10.2)
CHLORIDE SERPL-SCNC: 106 MMOL/L (ref 96–112)
CO2 SERPL-SCNC: 20 MMOL/L (ref 20–33)
CREAT SERPL-MCNC: 0.71 MG/DL (ref 0.5–1.4)
EOSINOPHIL # BLD AUTO: 0.24 K/UL (ref 0–0.51)
EOSINOPHIL NFR BLD: 2.6 % (ref 0–6.9)
ERYTHROCYTE [DISTWIDTH] IN BLOOD BY AUTOMATED COUNT: 45 FL (ref 35.9–50)
GLUCOSE SERPL-MCNC: 113 MG/DL (ref 65–99)
HCT VFR BLD AUTO: 30.1 % (ref 37–47)
HGB BLD-MCNC: 10.2 G/DL (ref 12–16)
IMM GRANULOCYTES # BLD AUTO: 0.04 K/UL (ref 0–0.11)
IMM GRANULOCYTES NFR BLD AUTO: 0.4 % (ref 0–0.9)
LYMPHOCYTES # BLD AUTO: 2.64 K/UL (ref 1–4.8)
LYMPHOCYTES NFR BLD: 28.6 % (ref 22–41)
MCH RBC QN AUTO: 33.8 PG (ref 27–33)
MCHC RBC AUTO-ENTMCNC: 33.9 G/DL (ref 33.6–35)
MCV RBC AUTO: 99.7 FL (ref 81.4–97.8)
MONOCYTES # BLD AUTO: 0.65 K/UL (ref 0–0.85)
MONOCYTES NFR BLD AUTO: 7 % (ref 0–13.4)
NEUTROPHILS # BLD AUTO: 5.64 K/UL (ref 2–7.15)
NEUTROPHILS NFR BLD: 61.1 % (ref 44–72)
NRBC # BLD AUTO: 0 K/UL
NRBC BLD-RTO: 0 /100 WBC
PHOSPHATE SERPL-MCNC: 2.7 MG/DL (ref 2.5–4.5)
PLATELET # BLD AUTO: 190 K/UL (ref 164–446)
PMV BLD AUTO: 10.7 FL (ref 9–12.9)
POTASSIUM SERPL-SCNC: 4.3 MMOL/L (ref 3.6–5.5)
RBC # BLD AUTO: 3.02 M/UL (ref 4.2–5.4)
SODIUM SERPL-SCNC: 137 MMOL/L (ref 135–145)
WBC # BLD AUTO: 9.2 K/UL (ref 4.8–10.8)

## 2021-11-05 PROCEDURE — A9270 NON-COVERED ITEM OR SERVICE: HCPCS | Performed by: ORTHOPAEDIC SURGERY

## 2021-11-05 PROCEDURE — 700102 HCHG RX REV CODE 250 W/ 637 OVERRIDE(OP): Performed by: ORTHOPAEDIC SURGERY

## 2021-11-05 PROCEDURE — 94760 N-INVAS EAR/PLS OXIMETRY 1: CPT

## 2021-11-05 PROCEDURE — A9270 NON-COVERED ITEM OR SERVICE: HCPCS | Performed by: HOSPITALIST

## 2021-11-05 PROCEDURE — 770006 HCHG ROOM/CARE - MED/SURG/GYN SEMI*

## 2021-11-05 PROCEDURE — 99232 SBSQ HOSP IP/OBS MODERATE 35: CPT | Performed by: HOSPITALIST

## 2021-11-05 PROCEDURE — 700102 HCHG RX REV CODE 250 W/ 637 OVERRIDE(OP): Performed by: HOSPITALIST

## 2021-11-05 PROCEDURE — 80069 RENAL FUNCTION PANEL: CPT

## 2021-11-05 PROCEDURE — 99024 POSTOP FOLLOW-UP VISIT: CPT | Performed by: ORTHOPAEDIC SURGERY

## 2021-11-05 PROCEDURE — 700111 HCHG RX REV CODE 636 W/ 250 OVERRIDE (IP): Performed by: ORTHOPAEDIC SURGERY

## 2021-11-05 PROCEDURE — 97530 THERAPEUTIC ACTIVITIES: CPT

## 2021-11-05 PROCEDURE — 36415 COLL VENOUS BLD VENIPUNCTURE: CPT

## 2021-11-05 PROCEDURE — 85025 COMPLETE CBC W/AUTO DIFF WBC: CPT

## 2021-11-05 RX ORDER — CHOLECALCIFEROL (VITAMIN D3) 125 MCG
5 CAPSULE ORAL NIGHTLY
Status: DISCONTINUED | OUTPATIENT
Start: 2021-11-05 | End: 2021-11-08 | Stop reason: HOSPADM

## 2021-11-05 RX ADMIN — ACETAMINOPHEN 650 MG: 325 TABLET, FILM COATED ORAL at 06:11

## 2021-11-05 RX ADMIN — DOCUSATE SODIUM 100 MG: 100 CAPSULE, LIQUID FILLED ORAL at 17:04

## 2021-11-05 RX ADMIN — DOCUSATE SODIUM 100 MG: 100 CAPSULE, LIQUID FILLED ORAL at 06:11

## 2021-11-05 RX ADMIN — POLYETHYLENE GLYCOL 3350 1 PACKET: 17 POWDER, FOR SOLUTION ORAL at 06:16

## 2021-11-05 RX ADMIN — ACETAMINOPHEN 650 MG: 325 TABLET, FILM COATED ORAL at 11:22

## 2021-11-05 RX ADMIN — GABAPENTIN 300 MG: 300 CAPSULE ORAL at 11:22

## 2021-11-05 RX ADMIN — OXYCODONE 5 MG: 5 TABLET ORAL at 10:23

## 2021-11-05 RX ADMIN — ENOXAPARIN SODIUM 40 MG: 40 INJECTION SUBCUTANEOUS at 00:37

## 2021-11-05 RX ADMIN — ACETAMINOPHEN 650 MG: 325 TABLET, FILM COATED ORAL at 17:05

## 2021-11-05 RX ADMIN — ACETAMINOPHEN 650 MG: 325 TABLET, FILM COATED ORAL at 00:36

## 2021-11-05 RX ADMIN — OXYCODONE HYDROCHLORIDE 10 MG: 10 TABLET ORAL at 15:05

## 2021-11-05 RX ADMIN — OXYCODONE HYDROCHLORIDE 10 MG: 10 TABLET ORAL at 00:37

## 2021-11-05 RX ADMIN — OXYCODONE HYDROCHLORIDE 10 MG: 10 TABLET ORAL at 21:37

## 2021-11-05 RX ADMIN — OXYCODONE HYDROCHLORIDE 10 MG: 10 TABLET ORAL at 18:20

## 2021-11-05 RX ADMIN — GABAPENTIN 300 MG: 300 CAPSULE ORAL at 06:11

## 2021-11-05 RX ADMIN — OXYCODONE HYDROCHLORIDE 10 MG: 10 TABLET ORAL at 06:10

## 2021-11-05 RX ADMIN — ATORVASTATIN CALCIUM 40 MG: 40 TABLET, FILM COATED ORAL at 17:05

## 2021-11-05 RX ADMIN — GABAPENTIN 600 MG: 300 CAPSULE ORAL at 21:37

## 2021-11-05 ASSESSMENT — ENCOUNTER SYMPTOMS
PALPITATIONS: 0
COUGH: 0
SPUTUM PRODUCTION: 0
DOUBLE VISION: 0
HEADACHES: 0
HEMOPTYSIS: 0
BLURRED VISION: 0
DEPRESSION: 0
FOCAL WEAKNESS: 0
DIZZINESS: 0
NAUSEA: 0
FEVER: 0
MYALGIAS: 0
ORTHOPNEA: 0
EYE PAIN: 0
VOMITING: 0
SORE THROAT: 0
NERVOUS/ANXIOUS: 1
HEARTBURN: 0
ABDOMINAL PAIN: 0

## 2021-11-05 ASSESSMENT — PAIN DESCRIPTION - PAIN TYPE
TYPE: ACUTE PAIN
TYPE: SURGICAL PAIN
TYPE: ACUTE PAIN
TYPE: SURGICAL PAIN

## 2021-11-05 ASSESSMENT — GAIT ASSESSMENTS: GAIT LEVEL OF ASSIST: UNABLE TO PARTICIPATE

## 2021-11-05 ASSESSMENT — COGNITIVE AND FUNCTIONAL STATUS - GENERAL
MOVING TO AND FROM BED TO CHAIR: A LOT
CLIMB 3 TO 5 STEPS WITH RAILING: TOTAL
SUGGESTED CMS G CODE MODIFIER MOBILITY: CM
STANDING UP FROM CHAIR USING ARMS: TOTAL
TURNING FROM BACK TO SIDE WHILE IN FLAT BAD: A LITTLE
WALKING IN HOSPITAL ROOM: TOTAL
MOBILITY SCORE: 9
MOVING FROM LYING ON BACK TO SITTING ON SIDE OF FLAT BED: UNABLE

## 2021-11-05 NOTE — PROGRESS NOTES
Received report from night shift RN. Patient A&Ox4, denies N/V, SOB at this time. Reporting pain, medicated per MAR. Immobilizer in place on RLE, dressing CDI. Call light, belongings in reach. Safety precautions in place.

## 2021-11-05 NOTE — DISCHARGE PLANNING
Received Choice form at 0729  Agency/Facility Name: [facility]  Referral sent per Choice form @ [phone]

## 2021-11-05 NOTE — PROGRESS NOTES
Spoke with patient on phone instead of at bedside due to being at [facility] with multiple trauma surgeries  She states she is feeling much better but had questions about future surgeries required and discharge planning  She understands that her distal femur fracture will need 6-12 weeks to heal before her knee replacement can be revised  She will be in brace and TTWB during that time  We will see her in clinic in 2 weeks to remove sutures and transition to a hinged knee brace as well as follow her at intervals with xrays  Case coordination for SNF vs Rehab is in progress

## 2021-11-05 NOTE — CARE PLAN
The patient is Stable - Low risk of patient condition declining or worsening    Shift Goals  Clinical Goals: Pain control   Patient Goals: Pain control      Progress made toward(s) clinical / shift goals:  Pt is able to find pain relief with pain medication administration. Pt encouraged to voice concerns if they should arise. Rounding in place     Patient is not progressing towards the following goals: n/a      Problem: Pain - Standard  Goal: Alleviation of pain or a reduction in pain to the patient’s comfort goal  Outcome: Progressing     Problem: Knowledge Deficit - Standard  Goal: Patient and family/care givers will demonstrate understanding of plan of care, disease process/condition, diagnostic tests and medications  Outcome: Progressing     Problem: Skin Integrity  Goal: Skin integrity is maintained or improved  Outcome: Progressing

## 2021-11-05 NOTE — DISCHARGE PLANNING
Received Choice form at [phone]  Agency/Facility Name: [facility]  Referral sent per Choice form @ [phone]

## 2021-11-05 NOTE — THERAPY
Physical Therapy   Daily Treatment     Patient Name: Sanam Andre  Age:  61 y.o., Sex:  female  Medical Record #: [id number]  Today's Date: 11/5/2021     Precautions  Precautions: (P) Fall Risk;Toe Touch Weight Bearing Right Lower Extremity;Immobilizer Right Lower Extremity  Comments: (P) No ROM at R knee     Assessment    Pt is progressing well and pt was able to tolerate increased activity today. Pt was agreeable to begin SB training and demonstrated with Min A from bed to w/c. Anticipate pt to required increased assist from w/c to bed due to higher bed. Pt was provided with cues, education, and appropriate use of W/C and was able to demonstrate safe navigation in hallways. Pt required Min A at times to maneuver w/c in small spaces. Recommend post-acute placement for continued physical therapy services prior to discharge home.       Plan    Continue current treatment plan.    DC Equipment Recommendations: (P) Unable to determine at this time  Discharge Recommendations: (P) Recommend post-acute placement for additional physical therapy services prior to discharge home    Objective       11/05/21 1300   Precautions   Precautions Fall Risk;Toe Touch Weight Bearing Right Lower Extremity;Immobilizer Right Lower Extremity   Comments No ROM at R knee    Pain 0 - 10 Group   Location Leg   Location Orientation Right   Description Throbbing   Therapist Pain Assessment Prior to Activity;During Activity;Post Activity;Nurse Notified;4   Cognition    Cognition / Consciousness WDL   Level of Consciousness Alert   Supine Lower Body Exercise   Supine Lower Body Exercises Yes   Straight Leg Raises Other Resistance (See Comments)  (5 reps active assist)   Ankle Pumps 1 set of 10   Other Treatments   Other Treatments Provided pt provided with demo for appropirate slideboard transfer to w/c   Balance   Sitting Balance (Static) Fair +   Sitting Balance (Dynamic) Fair +   Standing Balance (Static) Poor -   Standing Balance (Dynamic)  Dependent   Weight Shift Sitting Fair   Weight Shift Standing Absent   Skilled Intervention Verbal Cuing;Postural Facilitation;Facilitation   Comments w/fww   Gait Analysis   Gait Level Of Assist Unable to Participate   Weight Bearing Status TTWB R LE    Bed Mobility    Supine to Sit Minimal Assist   Scooting Minimal Assist   Skilled Intervention Verbal Cuing   Functional Mobility   Sit to Stand Moderate Assist   Bed, Chair, Wheelchair Transfer Minimal Assist  (with SB )   Transfer Method Slide Board   Mobility EOB, sit<>stand, transfer to w/c    Wheelchair Assist Minimal Assist   Distance Wheelchair (Feet or Distance) functional   Skilled Intervention Verbal Cuing;Sequencing;Facilitation;Compensatory Strategies   Comments cues and demo for appropriate transfer; education on appropirate use of w/c and features   How much difficulty does the patient currently have...   Turning over in bed (including adjusting bedclothes, sheets and blankets)? 3   Sitting down on and standing up from a chair with arms (e.g., wheelchair, bedside commode, etc.) 1   Moving from lying on back to sitting on the side of the bed? 2   How much help from another person does the patient currently need...   Moving to and from a bed to a chair (including a wheelchair)? 1   Need to walk in a hospital room? 1   Climbing 3-5 steps with a railing? 1   6 clicks Mobility Score 9   Activity Tolerance   Sitting in Chair functional   Sitting Edge of Bed 10 mins   Standing 30 seconds    Comments limited due to pain    Patient / Family Goals    Patient / Family Goal #1 to go back to PLOF    Short Term Goals    Short Term Goal # 1 pt will go supine<>sit w/hob elevated and rails up w/spv in 6tx    Goal Outcome # 1 Progressing as expected   Short Term Goal # 2 pt will go sit<>stand w/fww w/Min A in 6tx    Goal Outcome # 2 Progressing slower than expected   Short Term Goal # 3 pt will transfer bed<>chair w/SB w/spv in 6tx for meals    Goal Outcome # 3  Progressing as expected   Short Term Goal # 4 pt will mobilize in w/c for 100ft w/spv in 6tx    Goal Outcome # 4 Progressing as expected   Education Group   Education Provided Role of Physical Therapist   Role of Physical Therapist Patient Response Patient;Acceptance;Explanation;Demonstration;Verbal Demonstration;Action Demonstration   Weight Bearing Status Patient Response Patient;Acceptance;Explanation;Demonstration;Verbal Demonstration;Action Demonstration   Anticipated Discharge Equipment and Recommendations   DC Equipment Recommendations Unable to determine at this time   Discharge Recommendations Recommend post-acute placement for additional physical therapy services prior to discharge home   Interdisciplinary Plan of Care Collaboration   IDT Collaboration with  Nursing   Patient Position at End of Therapy Seated;Other (Comments)  (left up in w/c ; RN aware)   Session Information   Date / Session Number  11/5-2 (2/3, 11/10)

## 2021-11-05 NOTE — PROGRESS NOTES
"Hospital Medicine Daily Progress Note    Date of Service  11/5/2021    Chief Complaint  Sanam Andre is a 61 y.o. female admitted 11/1/2021 with   Chief Complaint   Patient presents with   • Syncope     Pt. reports syncope this AM, \"my vision went and I fell\". Reports she stayed on the ground for approx 9h- \"My daughter heard me go down but I didn't want her to move me\".    • Knee Pain     R knee- +hx arthritis, pain exacerbated by fall. ROM limited d/t pain, circulation and sensation intact.    • Laceration     Small lac to R low eyebrow- bleeding controlled.         Hospital Course  This is a 61 -year-old female with a past medical history significant for hypertension, LBBB, rheumatoid arthritis, chronic pain, chronic systolic congestive heart failure with EF of 45 %presented to the ER on 11/1/2021 with right lower extremity pain and swelling after a syncopal episode.    She reported that he woke up at 5 in the morning felt lightheaded, lost consciousness. When she woke up she was in extreme pain and not able to stand. X-ray showed Right distal femur periprosthetic fracture with subsided tibial component, Ortho DR Lockett was consulted and paient underwent Open treatment of right femoral supracondylar fracture without intracondylar extension on 11/03.  He did not develop clots per orthopedic surgery recommendation.    Patient is noted to have syncopal episode, echocardiogram was obtained, EF is noted to be 45%, abnormal septal motion consistent with left bundle branch block.  She is noted to have macrocytic anemia along with thrombocytopenia    Interval Problem Update  No acute events overnight, laying in a bed   Denied any complain   Continue to complain og pian in her leg   Vitals stable    I have personally seen and examined the patient at bedside. I discussed the plan of care with patient, bedside RN and charge RN.    11/03:  No acute events overnight, vital signs stable.  Patient will be going for " surgery by Dr. Lockett   --CBC tomorrow patient was transferred to [facility], orders placed    11/04 no acute events overnight, laying in bed continuing to complain. Hemoglobin hematocrit has remained stable, surgery following. PT/OT has evaluated, recommend postacute. Physiatry referral placed with the patient, nurse, case management.    110/5:  --No acute events around surrounding event monitoring,.  She has been medically cleared to go to skilled nursing facility versus rehab.  GI following.  -- hemoglobin and hematocrit has been stable, monitor  --She has questions in regards to the surgery, she talked with Dr. Lockett who  explained over the phone       consultants/Specialty  orthopedics    Code Status  Full Code    Disposition  Patient is not medically cleared.   Anticipate discharge to to skilled nursing facility.  I have placed the appropriate orders for post-discharge needs.    Review of Systems  Review of Systems   Constitutional: Positive for malaise/fatigue. Negative for fever.   HENT: Negative for congestion and sore throat.    Eyes: Negative for blurred vision, double vision and pain.   Respiratory: Negative for cough, hemoptysis and sputum production.    Cardiovascular: Negative for chest pain, palpitations and orthopnea.   Gastrointestinal: Negative for abdominal pain, heartburn, nausea and vomiting.   Genitourinary: Positive for dysuria.   Musculoskeletal: Negative for joint pain (Knee) and myalgias.   Neurological: Negative for dizziness, focal weakness and headaches.   Psychiatric/Behavioral: Negative for depression. The patient is nervous/anxious.         Physical Exam  Temp:  [36.4 °C (97.6 °F)-36.9 °C (98.5 °F)] 36.7 °C (98 °F)  Pulse:  [69-91] 86  Resp:  [18-20] 18  BP: (110-140)/(65-87) 135/79  SpO2:  [90 %-100 %] 92 %    Physical Exam  Vitals and nursing note reviewed.   Constitutional:       Appearance: Normal appearance.   HENT:      Head: Normocephalic and atraumatic.   Eyes:      Extraocular  Movements: Extraocular movements intact.   Cardiovascular:      Rate and Rhythm: Normal rate.      Pulses: Normal pulses.   Pulmonary:      Effort: No respiratory distress.      Breath sounds: Normal breath sounds.   Abdominal:      General: Bowel sounds are normal. There is no distension.      Palpations: Abdomen is soft.      Tenderness: There is no abdominal tenderness.   Musculoskeletal:      Cervical back: Neck supple.      Right lower leg: No edema.      Comments: Decreased range of motion secondary to pain on the right side   Skin:     General: Skin is warm.   Neurological:      Mental Status: She is alert and oriented to person, place, and time.      Cranial Nerves: No cranial nerve deficit.   Psychiatric:         Mood and Affect: Mood normal.         Fluids    Intake/Output Summary (Last 24 hours) at 11/5/2021 1300  Last data filed at 11/5/2021 0800  Gross per 24 hour   Intake 120 ml   Output 1900 ml   Net -1780 ml       Laboratory  Recent Labs     11/03/21  0500 11/04/21  0233 11/05/21  0149   WBC 7.4 8.3 9.2   RBC 3.39* 3.10* 3.02*   HEMOGLOBIN 11.3* 10.6* 10.2*   HEMATOCRIT 34.9* 31.7* 30.1*   .9* 102.3* 99.7*   MCH 33.3* 34.2* 33.8*   MCHC 32.4* 33.4* 33.9   RDW 46.4 45.2 45.0   PLATELETCT 162* 171 190   MPV 9.8 10.1 10.7     Recent Labs     11/03/21  0500 11/04/21  0233 11/05/21  0149   SODIUM 137 137 137   POTASSIUM 5.0 5.2 4.3   CHLORIDE 109 107 106   CO2 22 20 20   GLUCOSE 90 149* 113*   BUN 18 17 19   CREATININE 0.68 0.79 0.71   CALCIUM 9.8 9.1 9.3                   Imaging  DX-FEMUR-2+ RIGHT   Final Result      Intraoperative image(s) as described.      DX-PORTABLE FLUOROSCOPY < 1 HOUR Is the patient pregnant? No   Final Result      Portable fluoroscopy utilized for 34.4 seconds.         INTERPRETING LOCATION: [address] [place] [address], [id number]      EC-ECHOCARDIOGRAM COMPLETE W/O CONT   Final Result      DX-HIP-UNILATERAL-WITH PELVIS-1 VIEW RIGHT   Final Result      1.  No acute fracture is  identified.      DX-KNEE 2- RIGHT   Final Result   Addendum 1 of 1   Addendum:      Report dictated on the wrong exam.      There is a fracture of the distal right femoral metaphysis above the knee    arthroplasty. There is posterior angulation of the tibial component. There    is overlying soft tissue edema.      Impression:      Fracture of the distal femoral metaphysis, just superior to the femoral    component of the tricomponent knee arthroplasty      Final      Status post left total knee arthroplasty.      DX-CHEST-PORTABLE (1 VIEW)   Final Result      Medial right basilar opacity may represent early pneumonia or diaphragmatic prominence seen on recent CT. PA and lateral views of the chest may be helpful for further evaluation.      CT-HEAD W/O   Final Result      No acute intracranial abnormality.           Assessment/Plan  * Femoral fracture (HCC)- (present on admission)  Assessment & Plan  X-ray showed Right distal femur periprosthetic fracture with subsided tibial component, Ortho DR Lockett was consulted and paient underwent Open treatment of right femoral supracondylar fracture without intracondylar extension on 11/03.  , PT/OT has recommended postacute, physiatry referral in place    Chronic systolic heart failure (HCC)  Assessment & Plan  EF of 45%, continue cardiac diet, I/os.    Daily weight   Fluid restriction    Dyslipidemia- (present on admission)  Assessment & Plan  Resume home atorvastatin    Primary hypertension- (present on admission)  Assessment & Plan  Continue lisinopril-HCTZ  Blood pressure well under, continue as needed antihypertensive medication    DAWSON (acute kidney injury) (HCC)- (present on admission)  Assessment & Plan  Mostly due to dehydration   Improved with IVF   Avoid nephrotoxin    Syncope- (present on admission)  Assessment & Plan  Noted to have a left bundle branch block which has been chronic.  Echocardiogram showed EF of 45%, abnormal septal motion consistent with  LBBB  Telemetry monitoring showed bradycardia, left cardiovascular, no obvious arrhythmia noted    Thrombocytopenia (HCC)- (present on admission)  Assessment & Plan  Resolved, today at 191, monitor    Hx of Methadone dependence (HCC)- (present on admission)  Assessment & Plan  Currently off methadone    Hypokalemia- (present on admission)  Assessment & Plan  Replete and monitor       VTE prophylaxis: enoxaparin ppx        I have performed a physical exam and reviewed and updated ROS and Plan today (11/5/2021). In review of yesterday's note (11/4/2021), there are no changes except as documented above.

## 2021-11-05 NOTE — DISCHARGE SUMMARY
"Discharge Summary    CHIEF COMPLAINT ON ADMISSION  Chief Complaint   Patient presents with   • Syncope     Pt. reports syncope this AM, \"my vision went and I fell\". Reports she stayed on the ground for approx 9h- \"My daughter heard me go down but I didn't want her to move me\".    • Knee Pain     R knee- +hx arthritis, pain exacerbated by fall. ROM limited d/t pain, circulation and sensation intact.    • Laceration     Small lac to R low eyebrow- bleeding controlled.       Reason for Admission  EMS     Admission Date  11/1/2021    CODE STATUS  Full Code    HPI & HOSPITAL COURSE  This is a 61 -year-old female with a past medical history significant for hypertension, LBBB, rheumatoid arthritis, chronic pain, chronic systolic congestive heart failure with EF of 45 %presented to the ER on 11/1/2021 with right lower extremity pain and swelling after a syncopal episode.     She reported that he woke up at 5 am, felt lightheaded, lost consciousness. When she woke up she was in extreme pain and not able to stand. X-ray showed Right distal femur periprosthetic fracture with subsided tibial component, Ortho DR Lockett was consulted and paient underwent Open treatment of right femoral supracondylar fracture without intracondylar extension on 11/03.  Dr. Lockett  continue to follow the patient, recommended toe-touch weightbearing right lower extremity, patient need to use a knee immobilizer at all times.       Patient is noted to have syncopal episode, echocardiogram was obtained, EF is noted to be 45%, abnormal septal motion consistent with left bundle branch block.  During the stay in the hospital she was monitored in telemetry, continue to have left bundle branch block, no other arrhythmia noted.  She denies any lightheadedness.    Thrombocytopenia has resolved.  PT/OT has evaluated the patient and recommended postacute,.  Physiatry consult has been placed at this time patient medically stable to be discharged to  [facility]" Rehab.    Therefore, she is discharged in fair and stable condition to an inpatient rehabilitation hospital.    The patient met 2-midnight criteria for an inpatient stay at the time of discharge.    Discharge Date  11/05/2021    FOLLOW UP ITEMS POST DISCHARGE  RUDDY Avitia    DISCHARGE DIAGNOSES  Principal Problem:    Femoral fracture (HCC) POA: Yes  Active Problems:    Hypokalemia POA: Yes    Hx of Methadone dependence (HCC) POA: Yes    Thrombocytopenia (HCC) POA: Yes    Syncope POA: Yes    DAWSON (acute kidney injury) ([facility]) POA: Yes    Primary hypertension POA: Yes    Dyslipidemia POA: Yes    Chronic systolic heart failure (HCC) POA: Unknown  Resolved Problems:    * No resolved hospital problems. *      FOLLOW UP  No future appointments.  No follow-up provider specified.    MEDICATIONS ON DISCHARGE     Medication List      ASK your doctor about these medications      Instructions   acetaminophen 325 MG Tabs  Commonly known as: Tylenol   Take 2 Tablets by mouth every 6 hours as needed.  Dose: 650 mg     aspirin 81 MG EC tablet   Take 1 Tablet by mouth every day.  Dose: 81 mg     atorvastatin 40 MG Tabs  Commonly known as: LIPITOR   Take 1 Tablet by mouth every evening.  Dose: 40 mg     disulfiram 250 MG Tabs  Commonly known as: ANTABUSE   Take 250 mg by mouth every day.  Dose: 250 mg     gabapentin 300 MG Caps  Commonly known as: NEURONTIN   Take 300-600 mg by mouth 3 times a day. 300mg in the morning and at noon and 600mg at bedtime  Dose: 300-600 mg     ibuprofen 800 MG Tabs  Commonly known as: MOTRIN   Take 800 mg by mouth 2 times a day.  Dose: 800 mg     lisinopril-hydrochlorothiazide 20-25 MG per tablet  Commonly known as: PRINZIDE   Take 1 Tablet by mouth every day.  Dose: 1 Tablet     oxyCODONE immediate release 10 MG immediate release tablet  Commonly known as: ROXICODONE   Take 10 mg by mouth 2 times a day as needed for Moderate Pain.  Dose: 10 mg            Allergies  Allergies   Allergen  "Reactions   • Toradol [Ketorolac Tromethamine]      Patient reports \"severe headache for 2 hours\" post administration   • Codeine Vomiting   • Iodine Anaphylaxis     Reaction took place during CT and IV dye injection       DIET  Orders Placed This Encounter   Procedures   • Diet Order Diet: Regular     Standing Status:   Standing     Number of Occurrences:   1     Order Specific Question:   Diet:     Answer:   Regular [1]       ACTIVITY  As tolerated by rehab.  Toe-touch weightbearing on the right lower extremity, knee immobilizer at all place.    CONSULTATIONS  Ortho    PROCEDURES  Open treatment of right femoral supracondylar fracture without intracondylar extension  On 11/03/2021   LABORATORY  Lab Results   Component Value Date    SODIUM 137 11/05/2021    POTASSIUM 4.3 11/05/2021    CHLORIDE 106 11/05/2021    CO2 20 11/05/2021    GLUCOSE 113 (H) 11/05/2021    BUN 19 11/05/2021    CREATININE 0.71 11/05/2021    CREATININE 0.7 04/06/2008        Lab Results   Component Value Date    WBC 9.2 11/05/2021    HEMOGLOBIN 10.2 (L) 11/05/2021    HEMATOCRIT 30.1 (L) 11/05/2021    PLATELETCT 190 11/05/2021        Total time of the discharge process exceeds 35 minutes.  "

## 2021-11-05 NOTE — DISCHARGE PLANNING
Anticipated Discharge Disposition: [facility]    Action: Pt discussed during morning rounds. Per Dr. Zuñiga pt is medically cleared. LSW messaged Bryant with [facility] to inform that LSW is following pt today.     Barriers to Discharge: None     Plan: Await [facility] acceptance, LSW to continue to follow for d/c needs     Addendum 0884  LSW met with pt at bedside to discuss acute rehab choice. Pt provided verbal consent for referral to be placed to [facility].     Addendum 5313  LSW faxed acute rehab CHOICE to [facility].     Addendum 1023  LSW informed by Bryant with [facility] that referral still being reviewed by administration and concerns with pt going home and living on the 2nd floor.     LSW met with pt at bedside to discuss SNF CHOICE form. LSW requested pt reviewed and provided a selection of 3 SNF CHOICE's. Pt requested LSW f/u with CHOICE form in 2 hours.     Addendum 1201  PASRR#-[id number]    Pt also has Medicaid FFS and LOC not completed. LSW able to complete LOC assessment and it has gone into manual review.     Addendum 1322  LSW met with pt at bedside to discuss SNF CHOICE. Pt provided a signature for SNF CHOICE for referrals to be sent to #1- [facility] #2- [facility] #3- [facility]    Addendum 8559  LSW faxed SNF CHOICE to [facility].     Addendum 6735  LOC no longer in manual review.   LOC#- [id number]

## 2021-11-05 NOTE — DISCHARGE PLANNING
Physiatry to consult.     3308-Dr. Zuñiga has medically cleared.     0821-Spoke with Lena, daughter regarding [facility] and D/C resources/support.  She is agreeable with an admission.  She states that Sanam lives with her and hr spouse and children in a second floor apt with a flight of stairs to access.  No elevator access.  Tammi, daughter and eusebio, Sister both live in a second floor apt as well with out an elevator access.  Will discuss this case further with the Admissions Team this morning.     0959-Case is under review by Administration.  Concern is going home to second floor apt.  Msg placed to Dr. Zuñiga & Suzanne FOSTER

## 2021-11-05 NOTE — DISCHARGE PLANNING
Following for post acute services anticipate skilled nursing when medically cleared. Stairs are a barrier to IRF limited duration of care.

## 2021-11-06 PROCEDURE — 770006 HCHG ROOM/CARE - MED/SURG/GYN SEMI*

## 2021-11-06 PROCEDURE — 97112 NEUROMUSCULAR REEDUCATION: CPT

## 2021-11-06 PROCEDURE — A9270 NON-COVERED ITEM OR SERVICE: HCPCS | Performed by: ORTHOPAEDIC SURGERY

## 2021-11-06 PROCEDURE — 700111 HCHG RX REV CODE 636 W/ 250 OVERRIDE (IP): Performed by: ORTHOPAEDIC SURGERY

## 2021-11-06 PROCEDURE — A9270 NON-COVERED ITEM OR SERVICE: HCPCS | Performed by: HOSPITALIST

## 2021-11-06 PROCEDURE — 700102 HCHG RX REV CODE 250 W/ 637 OVERRIDE(OP): Performed by: NURSE PRACTITIONER

## 2021-11-06 PROCEDURE — A9270 NON-COVERED ITEM OR SERVICE: HCPCS | Performed by: NURSE PRACTITIONER

## 2021-11-06 PROCEDURE — 700102 HCHG RX REV CODE 250 W/ 637 OVERRIDE(OP): Performed by: ORTHOPAEDIC SURGERY

## 2021-11-06 PROCEDURE — 97530 THERAPEUTIC ACTIVITIES: CPT

## 2021-11-06 PROCEDURE — 700102 HCHG RX REV CODE 250 W/ 637 OVERRIDE(OP): Performed by: HOSPITALIST

## 2021-11-06 PROCEDURE — 97535 SELF CARE MNGMENT TRAINING: CPT

## 2021-11-06 PROCEDURE — 99232 SBSQ HOSP IP/OBS MODERATE 35: CPT | Performed by: HOSPITALIST

## 2021-11-06 PROCEDURE — 94760 N-INVAS EAR/PLS OXIMETRY 1: CPT

## 2021-11-06 RX ORDER — POLYETHYLENE GLYCOL 3350 17 G/17G
17 POWDER, FOR SOLUTION ORAL 2 TIMES DAILY PRN
Qty: 15 EACH | Refills: 3 | Status: SHIPPED | OUTPATIENT
Start: 2021-11-06 | End: 2023-02-27

## 2021-11-06 RX ORDER — LISINOPRIL 5 MG/1
5 TABLET ORAL DAILY
Qty: 30 TABLET | Status: SHIPPED
Start: 2021-11-07 | End: 2023-02-27

## 2021-11-06 RX ORDER — FUROSEMIDE 20 MG/1
20 TABLET ORAL DAILY
Qty: 30 TABLET | Refills: 0 | Status: SHIPPED
Start: 2021-11-06 | End: 2021-12-06

## 2021-11-06 RX ORDER — OXYCODONE HYDROCHLORIDE 10 MG/1
10 TABLET ORAL EVERY 6 HOURS PRN
Qty: 8 TABLET | Refills: 0 | Status: SHIPPED | OUTPATIENT
Start: 2021-11-06 | End: 2021-11-08

## 2021-11-06 RX ORDER — KETOROLAC TROMETHAMINE 10 MG/1
10 TABLET, FILM COATED ORAL EVERY 6 HOURS PRN
Status: DISCONTINUED | OUTPATIENT
Start: 2021-11-06 | End: 2021-11-08 | Stop reason: HOSPADM

## 2021-11-06 RX ADMIN — GABAPENTIN 300 MG: 300 CAPSULE ORAL at 12:13

## 2021-11-06 RX ADMIN — ACETAMINOPHEN 650 MG: 325 TABLET, FILM COATED ORAL at 05:45

## 2021-11-06 RX ADMIN — OXYCODONE HYDROCHLORIDE 10 MG: 10 TABLET ORAL at 09:02

## 2021-11-06 RX ADMIN — ACETAMINOPHEN 650 MG: 325 TABLET, FILM COATED ORAL at 12:12

## 2021-11-06 RX ADMIN — KETOROLAC TROMETHAMINE 10 MG: 10 TABLET, FILM COATED ORAL at 15:18

## 2021-11-06 RX ADMIN — ACETAMINOPHEN 650 MG: 325 TABLET, FILM COATED ORAL at 17:45

## 2021-11-06 RX ADMIN — ACETAMINOPHEN 650 MG: 325 TABLET, FILM COATED ORAL at 00:18

## 2021-11-06 RX ADMIN — GABAPENTIN 300 MG: 300 CAPSULE ORAL at 05:45

## 2021-11-06 RX ADMIN — DOCUSATE SODIUM 100 MG: 100 CAPSULE, LIQUID FILLED ORAL at 05:46

## 2021-11-06 RX ADMIN — OXYCODONE HYDROCHLORIDE 10 MG: 10 TABLET ORAL at 05:45

## 2021-11-06 RX ADMIN — GABAPENTIN 600 MG: 300 CAPSULE ORAL at 20:22

## 2021-11-06 RX ADMIN — ENOXAPARIN SODIUM 40 MG: 40 INJECTION SUBCUTANEOUS at 00:19

## 2021-11-06 RX ADMIN — ATORVASTATIN CALCIUM 40 MG: 40 TABLET, FILM COATED ORAL at 17:45

## 2021-11-06 RX ADMIN — OXYCODONE HYDROCHLORIDE 10 MG: 10 TABLET ORAL at 20:21

## 2021-11-06 RX ADMIN — DOCUSATE SODIUM 100 MG: 100 CAPSULE, LIQUID FILLED ORAL at 17:45

## 2021-11-06 RX ADMIN — Medication 5 MG: at 00:18

## 2021-11-06 RX ADMIN — POLYETHYLENE GLYCOL 3350 1 PACKET: 17 POWDER, FOR SOLUTION ORAL at 08:39

## 2021-11-06 RX ADMIN — OXYCODONE HYDROCHLORIDE 10 MG: 10 TABLET ORAL at 16:03

## 2021-11-06 ASSESSMENT — COGNITIVE AND FUNCTIONAL STATUS - GENERAL
CLIMB 3 TO 5 STEPS WITH RAILING: TOTAL
WALKING IN HOSPITAL ROOM: TOTAL
MOBILITY SCORE: 14
MOVING FROM LYING ON BACK TO SITTING ON SIDE OF FLAT BED: A LOT
SUGGESTED CMS G CODE MODIFIER MOBILITY: CL
STANDING UP FROM CHAIR USING ARMS: A LOT

## 2021-11-06 ASSESSMENT — PAIN DESCRIPTION - PAIN TYPE
TYPE: ACUTE PAIN;SURGICAL PAIN
TYPE: CHRONIC PAIN;SURGICAL PAIN;ACUTE PAIN
TYPE: SURGICAL PAIN;ACUTE PAIN
TYPE: CHRONIC PAIN;SURGICAL PAIN
TYPE: SURGICAL PAIN
TYPE: ACUTE PAIN;SURGICAL PAIN

## 2021-11-06 ASSESSMENT — GAIT ASSESSMENTS
ASSISTIVE DEVICE: FRONT WHEEL WALKER
GAIT LEVEL OF ASSIST: MODERATE ASSIST
DISTANCE (FEET): 2
DEVIATION: ANTALGIC

## 2021-11-06 NOTE — CARE PLAN
The patient is Stable - Low risk of patient condition declining or worsening    Shift Goals  Clinical Goals: Pain control  Patient Goals: Pain control      Progress made toward(s) clinical / shift goals:    Problem: Pain - Standard  Goal: Alleviation of pain or a reduction in pain to the patient’s comfort goal  Outcome: Progressing  Note: Patient able to communicate pain needs appropriately.      Problem: Knowledge Deficit - Standard  Goal: Patient and family/care givers will demonstrate understanding of plan of care, disease process/condition, diagnostic tests and medications  Outcome: Progressing  Note: Patient able to work with PT today with slide board transfers.      Problem: Skin Integrity  Goal: Skin integrity is maintained or improved  Outcome: Progressing     Problem: Post-Operative Knee Replacement  Goal: Patient's neurovascular status will be maintained or improve  Outcome: Progressing  Goal: Early mobilization post surgery  Outcome: Progressing       Patient is not progressing towards the following goals:n/a

## 2021-11-06 NOTE — DISCHARGE PLANNING
Anticipated Discharge Disposition: SNF     Action: LSW called Noemy with [facility] to f/u up on bed availability as requested yesterday 11/5. LSW informed that no beds available today.     Pt also accepted by-   [facility]- Does not admit during the weekend   [facility]- left a voicemail waiting bed availability     Barriers to Discharge: SNF bed     Plan: Await SNF bed availability, HCM to continue to follow for d/c needs

## 2021-11-06 NOTE — CARE PLAN
The patient is Stable - Low risk of patient condition declining or worsening    Shift Goals  Clinical Goals: pain control and movement  Patient Goals: pt will rest    Progress made toward(s) clinical / shift goals:  Pt requested some melatonin to help with sleep.Page MD and new orders was updated.Pt now resting no signs of distress.    Patient is not progressing towards the following goals:      Problem: Pain - Standard  Goal: Alleviation of pain or a reduction in pain to the patient’s comfort goal  Outcome: Progressing     Problem: Knowledge Deficit - Standard  Goal: Patient and family/care givers will demonstrate understanding of plan of care, disease process/condition, diagnostic tests and medications  Outcome: Progressing     Problem: Skin Integrity  Goal: Skin integrity is maintained or improved  Outcome: Progressing     Problem: Fall Risk  Goal: Patient will remain free from falls  Outcome: Progressing

## 2021-11-06 NOTE — THERAPY
Occupational Therapy  Daily Treatment     Patient Name: Sanam Andre  Age:  61 y.o., Sex:  female  Medical Record #: [id number]  Today's Date: 11/6/2021     Precautions  Precautions: Fall Risk, Toe Touch Weight Bearing Right Lower Extremity, Immobilizer Right Lower Extremity  Comments: No ROM at R knee     Assessment  Pt is A&Ox4, motivated for activity. Shows improved functional mobility - able to transfer with ModA,FWW from bed to chair. Maintains TTWB RLE. C/o L knee pain (chronic), but bears weight. LB dressing with MaxA. Grooming with Indep, OT will follow while in house; anticipate pt being transferred to post-acute in rehab soon.         Plan  Continue current treatment plan.    DC Equipment Recommendations: (P) Unable to determine at this time  Discharge Recommendations: (P) Recommend post-acute placement for additional occupational therapy services prior to discharge home     11/06/21 0954   Cognition    Cognition / Consciousness WDL   Level of Consciousness Alert   Balance   Sitting Balance (Static) Good   Sitting Balance (Dynamic) Fair +   Standing Balance (Static) Fair   Standing Balance (Dynamic) Fair -   Weight Shift Sitting Fair   Weight Shift Standing Poor   Skilled Intervention Verbal Cuing;Sequencing   Comments TTWB RLE   Bed Mobility    Supine to Sit Modified Independent   Activities of Daily Living   Eating Independent   Grooming Seated;Modified Independent   Lower Body Dressing Maximal Assist   Skilled Intervention Compensatory Strategies;Verbal Cuing   Functional Mobility   Sit to Stand Moderate Assist   Bed, Chair, Wheelchair Transfer Moderate Assist   Transfer Method Stand Pivot   Activity Tolerance   Sitting in Chair >1hr   Sitting Edge of Bed 10   Standing 2   Patient / Family Goals   Goal #1 Outcome Progressing as expected   Short Term Goals   Goal Outcome # 1 Progressing as expected   Goal Outcome # 2 Goal met, new goal added   Short Term Goal # 2 B  Pt will transfer with Ed within  5 days    Goal Outcome # 2 B Progressing as expected   Goal Outcome # 3 Progressing as expected

## 2021-11-06 NOTE — THERAPY
Physical Therapy   Daily Treatment     Patient Name: Sanam Andre  Age:  61 y.o., Sex:  female  Medical Record #: [id number]  Today's Date: 11/6/2021     Precautions  Precautions: Fall Risk;Toe Touch Weight Bearing Right Lower Extremity;Immobilizer Right Lower Extremity  Comments: No ROM at R knee     Assessment    Pt able to stand and pivot transfer to chair today    Plan    Continue current treatment plan.      11/06/21 1000   Total Time Spent   Total Time Spent (Mins) 30   Charge Group   PT Therapeutic Activities 2   Pain 0 - 10 Group   Therapist Pain Assessment 3   Balance   Sitting Balance (Static) Good   Sitting Balance (Dynamic) Good   Standing Balance (Static) Fair   Standing Balance (Dynamic) Fair   Weight Shift Sitting Fair   Weight Shift Standing Poor   Gait Analysis   Gait Level Of Assist Moderate Assist   Assistive Device Front Wheel Walker   Distance (Feet) 2   # of Times Distance was Traveled 1   Deviation Antalgic   Weight Bearing Status TTWB R   Comments immobiliser on R   Bed Mobility    Supine to Sit Modified Independent   Sit to Supine Modified Independent   Scooting Modified Independent   Functional Mobility   Sit to Stand Moderate Assist   Bed, Chair, Wheelchair Transfer Moderate Assist   Transfer Method Stand Pivot   How much difficulty does the patient currently have...   Turning over in bed (including adjusting bedclothes, sheets and blankets)? 4   Sitting down on and standing up from a chair with arms (e.g., wheelchair, bedside commode, etc.) 2   Moving from lying on back to sitting on the side of the bed? 4   How much help from another person does the patient currently need...   Moving to and from a bed to a chair (including a wheelchair)? 2   Need to walk in a hospital room? 1   Climbing 3-5 steps with a railing? 1   6 clicks Mobility Score 14   Activity Tolerance   Sitting in Chair > 1hr   Sitting Edge of Bed 10   Standing 2   Short Term Goals    Short Term Goal # 1 pt will go  supine<>sit w/hob elevated and rails up w/spv in 6tx    Goal Outcome # 1 Progressing as expected   Short Term Goal # 2 pt will go sit<>stand w/fww w/Min A in 6tx    Goal Outcome # 2 Progressing as expected   Short Term Goal # 3 pt will transfer bed<>chair w/SB w/spv in 6tx for meals    Goal Outcome # 3 Progressing as expected   Anticipated Discharge Equipment and Recommendations   Discharge Recommendations Recommend post-acute placement for additional physical therapy services prior to discharge home   Interdisciplinary Plan of Care Collaboration   IDT Collaboration with  Nursing   Session Information   Date / Session Number  11/6-3 3/5 11/10       DC Equipment Recommendations: Unable to determine at this time  Discharge Recommendations: (P) Recommend post-acute placement for additional physical therapy services prior to discharge home

## 2021-11-06 NOTE — PROGRESS NOTES
"\"Covid 19 surge in effect\"    Repoer received from day RN Pt is AAO x 4.  Pt reports pain medicated per MAR.mmobilizer in place on RLE, dressing CDI. POC discussed.All needs met at this time.Bed in low position.Call light within reach.Rounding in place.     "

## 2021-11-06 NOTE — PROGRESS NOTES
"Orthopedic Physician Assistant Note    Subjective:  POD 3:  Open treatment of right femoral supracondylar fracture without intracondylar extension  Sitting in chair, still 2 person assist. C/o joint pain in left knee, dx of RA, has lost her rheumatologist \"because I screwed up and had a drinking problem, so I'm not taking Enbrel any longer\"    Objective:    Blood pressure 121/82, pulse 79, temperature 36.6 °C (97.9 °F), temperature source Temporal, resp. rate 18, height 1.549 m (5' 1\"), weight 49.6 kg (109 lb 5.6 oz), SpO2 99 %, not currently breastfeeding.    Labs:  Recent Labs     11/04/21  0233 11/05/21  0149   WBC 8.3 9.2   RBC 3.10* 3.02*   HEMOGLOBIN 10.6* 10.2*   HEMATOCRIT 31.7* 30.1*   .3* 99.7*   MCH 34.2* 33.8*   RDW 45.2 45.0   PLATELETCT 171 190   MPV 10.1 10.7   NEUTSPOLYS 84.20* 61.10   LYMPHOCYTES 9.40* 28.60   MONOCYTES 5.90 7.00   EOSINOPHILS 0.00 2.60   BASOPHILS 0.10 0.30         Recent Labs     11/04/21 0233 11/05/21  0149   SODIUM 137 137   POTASSIUM 5.2 4.3   CHLORIDE 107 106   CO2 20 20   GLUCOSE 149* 113*   BUN 17 19       Results     ** No results found for the last 168 hours. **          Assessment:  AOx4, dressing c/d/i. Strong DP pulse  Plan:  Likely to a rehab/snf for discharge        "

## 2021-11-06 NOTE — DISCHARGE SUMMARY
"Discharge Summary    CHIEF COMPLAINT ON ADMISSION  Chief Complaint   Patient presents with   • Syncope     Pt. reports syncope this AM, \"my vision went and I fell\". Reports she stayed on the ground for approx 9h- \"My daughter heard me go down but I didn't want her to move me\".    • Knee Pain     R knee- +hx arthritis, pain exacerbated by fall. ROM limited d/t pain, circulation and sensation intact.    • Laceration     Small lac to R low eyebrow- bleeding controlled.       Reason for Admission  EMS     Admission Date  11/1/2021    CODE STATUS  Full Code    HPI & HOSPITAL COURSE  This is a 61 -year-old female with a past medical history significant for hypertension, LBBB, rheumatoid arthritis, chronic pain, chronic systolic congestive heart failure with EF of 45 %presented to the ER on 11/1/2021 with right lower extremity pain and swelling after a syncopal episode.     She reported that he woke up at 5 am, felt lightheaded, lost consciousness. When she woke up she was in extreme pain and not able to stand. X-ray showed Right distal femur periprosthetic fracture with subsided tibial component, Ortho DR Lockett was consulted and paient underwent Open treatment of right femoral supracondylar fracture without intracondylar extension on 11/03.  Dr. Lockett  continue to follow the patient, recommended toe-touch weightbearing right lower extremity, patient need to use a knee immobilizer at all times.    Patient is noted to have syncopal episode, echocardiogram was obtained, EF is noted to be 45%, abnormal septal motion consistent with left bundle branch block.  During the stay in the hospital she was monitored in telemetry, continue to have left bundle branch block, no other arrhythmia noted.  She denies any lightheadedness. She will continue lisinopril 5 mg po qd and aspirin.    Thrombocytopenia has resolved.  PT/OT has evaluated the patient and recommended postacute,.  Physiatry consult has been placed at this time " patient medically stable to be discharged to  [facility].    Therefore, she is discharged in fair and stable condition to an inpatient rehabilitation hospital.    The patient met 2-midnight criteria for an inpatient stay at the time of discharge.    Discharge Date  11/06/2021    FOLLOW UP ITEMS POST DISCHARGE  RUDDY Avitia    DISCHARGE DIAGNOSES  Principal Problem:    Femoral fracture (HCC) POA: Yes  Active Problems:    Hypokalemia POA: Yes    Hx of Methadone dependence (HCC) POA: Yes    Thrombocytopenia (HCC) POA: Yes    Syncope POA: Yes    DAWSON (acute kidney injury) ([facility]) POA: Yes    Primary hypertension POA: Yes    Dyslipidemia POA: Yes    Chronic systolic heart failure (HCC) POA: Unknown  Resolved Problems:    * No resolved hospital problems. *      FOLLOW UP  No future appointments.  RUDDY Avitia  [address] [address]  [phone]      Please call your primary care provider to schedule a hospital follow up. Thank you      MEDICATIONS ON DISCHARGE     Medication List      START taking these medications      Instructions   enoxaparin 40 MG/0.4ML Soln inj  Start taking on: November 7, 2021  Commonly known as: LOVENOX   Inject 40 mg under the skin every day for 24 days.  Dose: 40 mg     furosemide 20 MG Tabs  Commonly known as: LASIX   Take 1 Tablet by mouth every day for 30 days.  Dose: 20 mg     lisinopril 5 MG Tabs  Start taking on: November 7, 2021  Commonly known as: PRINIVIL   Take 1 Tablet by mouth every day.  Dose: 5 mg     polyethylene glycol/lytes 17 g Pack  Commonly known as: MIRALAX   Take 1 Packet by mouth 2 times a day as needed (if sennosides and/or docusate ineffective or not ordered).  Dose: 17 g        CHANGE how you take these medications      Instructions   oxyCODONE immediate release 10 MG immediate release tablet  What changed: when to take this  Commonly known as: ROXICODONE   Take 1 Tablet by mouth every 6 hours as needed for Moderate Pain for  "up to 2 days.  Dose: 10 mg        CONTINUE taking these medications      Instructions   acetaminophen 325 MG Tabs  Commonly known as: Tylenol   Take 2 Tablets by mouth every 6 hours as needed.  Dose: 650 mg     aspirin 81 MG EC tablet   Take 1 Tablet by mouth every day.  Dose: 81 mg     atorvastatin 40 MG Tabs  Commonly known as: LIPITOR   Take 1 Tablet by mouth every evening.  Dose: 40 mg     gabapentin 300 MG Caps  Commonly known as: NEURONTIN   Take 300-600 mg by mouth 3 times a day. 300mg in the morning and at noon and 600mg at bedtime  Dose: 300-600 mg        STOP taking these medications    disulfiram 250 MG Tabs  Commonly known as: ANTABUSE     ibuprofen 800 MG Tabs  Commonly known as: MOTRIN     lisinopril-hydrochlorothiazide 20-25 MG per tablet  Commonly known as: PRINZIDE            Allergies  Allergies   Allergen Reactions   • Toradol [Ketorolac Tromethamine]      Patient reports \"severe headache for 2 hours\" post administration   • Codeine Vomiting   • Iodine Anaphylaxis     Reaction took place during CT and IV dye injection       DIET  Orders Placed This Encounter   Procedures   • Diet Order Diet: Regular     Standing Status:   Standing     Number of Occurrences:   1     Order Specific Question:   Diet:     Answer:   Regular [1]       ACTIVITY  As tolerated by rehab.  Toe-touch weightbearing on the right lower extremity, knee immobilizer at all place.    CONSULTATIONS  Ortho    PROCEDURES  Open treatment of right femoral supracondylar fracture without intracondylar extension  On 11/03/2021   LABORATORY  Lab Results   Component Value Date    SODIUM 137 11/05/2021    POTASSIUM 4.3 11/05/2021    CHLORIDE 106 11/05/2021    CO2 20 11/05/2021    GLUCOSE 113 (H) 11/05/2021    BUN 19 11/05/2021    CREATININE 0.71 11/05/2021    CREATININE 0.7 04/06/2008        Lab Results   Component Value Date    WBC 9.2 11/05/2021    HEMOGLOBIN 10.2 (L) 11/05/2021    HEMATOCRIT 30.1 (L) 11/05/2021    PLATELETCT 190 11/05/2021 "        Total time of the discharge process exceeds 35 minutes.

## 2021-11-07 LAB
ERYTHROCYTE [DISTWIDTH] IN BLOOD BY AUTOMATED COUNT: 46.4 FL (ref 35.9–50)
HCT VFR BLD AUTO: 36.7 % (ref 37–47)
HGB BLD-MCNC: 11.9 G/DL (ref 12–16)
MCH RBC QN AUTO: 32.8 PG (ref 27–33)
MCHC RBC AUTO-ENTMCNC: 32.4 G/DL (ref 33.6–35)
MCV RBC AUTO: 101.1 FL (ref 81.4–97.8)
PLATELET # BLD AUTO: 245 K/UL (ref 164–446)
PMV BLD AUTO: 9.9 FL (ref 9–12.9)
RBC # BLD AUTO: 3.63 M/UL (ref 4.2–5.4)
WBC # BLD AUTO: 7.6 K/UL (ref 4.8–10.8)

## 2021-11-07 PROCEDURE — A9270 NON-COVERED ITEM OR SERVICE: HCPCS | Performed by: NURSE PRACTITIONER

## 2021-11-07 PROCEDURE — 700102 HCHG RX REV CODE 250 W/ 637 OVERRIDE(OP): Performed by: ORTHOPAEDIC SURGERY

## 2021-11-07 PROCEDURE — 36415 COLL VENOUS BLD VENIPUNCTURE: CPT

## 2021-11-07 PROCEDURE — 700102 HCHG RX REV CODE 250 W/ 637 OVERRIDE(OP): Performed by: HOSPITALIST

## 2021-11-07 PROCEDURE — A9270 NON-COVERED ITEM OR SERVICE: HCPCS | Performed by: HOSPITALIST

## 2021-11-07 PROCEDURE — 99024 POSTOP FOLLOW-UP VISIT: CPT | Performed by: ORTHOPAEDIC SURGERY

## 2021-11-07 PROCEDURE — 770006 HCHG ROOM/CARE - MED/SURG/GYN SEMI*

## 2021-11-07 PROCEDURE — A9270 NON-COVERED ITEM OR SERVICE: HCPCS | Performed by: ORTHOPAEDIC SURGERY

## 2021-11-07 PROCEDURE — 85027 COMPLETE CBC AUTOMATED: CPT

## 2021-11-07 PROCEDURE — 700102 HCHG RX REV CODE 250 W/ 637 OVERRIDE(OP): Performed by: NURSE PRACTITIONER

## 2021-11-07 PROCEDURE — 99232 SBSQ HOSP IP/OBS MODERATE 35: CPT | Performed by: HOSPITALIST

## 2021-11-07 PROCEDURE — 700111 HCHG RX REV CODE 636 W/ 250 OVERRIDE (IP): Performed by: ORTHOPAEDIC SURGERY

## 2021-11-07 RX ADMIN — OXYCODONE HYDROCHLORIDE 10 MG: 10 TABLET ORAL at 00:17

## 2021-11-07 RX ADMIN — OXYCODONE HYDROCHLORIDE 10 MG: 10 TABLET ORAL at 07:43

## 2021-11-07 RX ADMIN — ATORVASTATIN CALCIUM 40 MG: 40 TABLET, FILM COATED ORAL at 17:08

## 2021-11-07 RX ADMIN — Medication 5 MG: at 22:14

## 2021-11-07 RX ADMIN — ACETAMINOPHEN 650 MG: 325 TABLET, FILM COATED ORAL at 04:37

## 2021-11-07 RX ADMIN — KETOROLAC TROMETHAMINE 10 MG: 10 TABLET, FILM COATED ORAL at 00:17

## 2021-11-07 RX ADMIN — OXYCODONE HYDROCHLORIDE 10 MG: 10 TABLET ORAL at 12:00

## 2021-11-07 RX ADMIN — GABAPENTIN 300 MG: 300 CAPSULE ORAL at 04:37

## 2021-11-07 RX ADMIN — GABAPENTIN 600 MG: 300 CAPSULE ORAL at 22:14

## 2021-11-07 RX ADMIN — OXYCODONE HYDROCHLORIDE 10 MG: 10 TABLET ORAL at 17:07

## 2021-11-07 RX ADMIN — DOCUSATE SODIUM 100 MG: 100 CAPSULE, LIQUID FILLED ORAL at 17:07

## 2021-11-07 RX ADMIN — DOCUSATE SODIUM 100 MG: 100 CAPSULE, LIQUID FILLED ORAL at 04:37

## 2021-11-07 RX ADMIN — ENOXAPARIN SODIUM 40 MG: 40 INJECTION SUBCUTANEOUS at 00:17

## 2021-11-07 RX ADMIN — OXYCODONE HYDROCHLORIDE 10 MG: 10 TABLET ORAL at 20:32

## 2021-11-07 RX ADMIN — GABAPENTIN 300 MG: 300 CAPSULE ORAL at 11:44

## 2021-11-07 RX ADMIN — ACETAMINOPHEN 650 MG: 325 TABLET, FILM COATED ORAL at 17:07

## 2021-11-07 RX ADMIN — OXYCODONE HYDROCHLORIDE 10 MG: 10 TABLET ORAL at 03:24

## 2021-11-07 RX ADMIN — LISINOPRIL 5 MG: 5 TABLET ORAL at 04:37

## 2021-11-07 RX ADMIN — ACETAMINOPHEN 650 MG: 325 TABLET, FILM COATED ORAL at 11:45

## 2021-11-07 ASSESSMENT — PATIENT HEALTH QUESTIONNAIRE - PHQ9
1. LITTLE INTEREST OR PLEASURE IN DOING THINGS: NOT AT ALL
2. FEELING DOWN, DEPRESSED, IRRITABLE, OR HOPELESS: NOT AT ALL
SUM OF ALL RESPONSES TO PHQ9 QUESTIONS 1 AND 2: 0

## 2021-11-07 ASSESSMENT — PAIN DESCRIPTION - PAIN TYPE
TYPE: ACUTE PAIN
TYPE: ACUTE PAIN;CHRONIC PAIN
TYPE: CHRONIC PAIN;SURGICAL PAIN
TYPE: ACUTE PAIN;CHRONIC PAIN
TYPE: ACUTE PAIN
TYPE: CHRONIC PAIN;SURGICAL PAIN
TYPE: CHRONIC PAIN
TYPE: ACUTE PAIN
TYPE: ACUTE PAIN;CHRONIC PAIN
TYPE: CHRONIC PAIN;SURGICAL PAIN
TYPE: ACUTE PAIN;CHRONIC PAIN

## 2021-11-07 ASSESSMENT — ENCOUNTER SYMPTOMS
CHILLS: 0
ORTHOPNEA: 0
HEADACHES: 0
EYE PAIN: 0
FOCAL WEAKNESS: 0
SORE THROAT: 0
ABDOMINAL PAIN: 0
BLURRED VISION: 0
MYALGIAS: 0
NERVOUS/ANXIOUS: 1
HEMOPTYSIS: 0
DIZZINESS: 0
COUGH: 0
SPUTUM PRODUCTION: 0
HEARTBURN: 0
CONSTIPATION: 0
DEPRESSION: 0

## 2021-11-07 NOTE — DISCHARGE PLANNING
Anticipated Discharge Disposition:   SNF     Action:   Chart review complete.     Per MD, patient is medically cleared to discharge to SNF. RN CM to follow up with [facility] for a bed.     1000: RN CM called [facility] for bed availability. No answer, voicemail left with call back number.     1012: LYNDSEY YUEN called [facility]. No answer. Voicemail left.     [facility] does not take admissions on the weekend.     Barriers to Discharge:   SNF bed availability     Plan:   Hospital care management will continue to assist with discharge planning needs.

## 2021-11-07 NOTE — PROGRESS NOTES
"Patient seen and examined  Very pleasant  Complains of pain as expected    /99   Pulse 95   Temp 36.4 °C (97.6 °F) (Oral)   Resp 18   Ht 1.549 m (5' 1\")   Wt 49.6 kg (109 lb 5.6 oz)   SpO2 94%     Recent Labs     11/05/21  0149 11/07/21  0905   WBC 9.2 7.6   RBC 3.02* 3.63*   HEMOGLOBIN 10.2* 11.9*   HEMATOCRIT 30.1* 36.7*   MCV 99.7* 101.1*   MCH 33.8* 32.8   MCHC 33.9 32.4*   RDW 45.0 46.4   PLATELETCT 190 245   MPV 10.7 9.9       No acute distress  Dressing clean dry and intact  Neurovascularly intact    POD# 4 orif distal femur periprosthetic fracture      Plan:  DVT Prophylaxis- TEDS/SCDs, LMWH while in hospital, ASA 81 mg PO BID as outpatient  Weight Bearing Status- TTWB  PT/OT  Antibiotics: perioperative complete  Case Coordination          "

## 2021-11-07 NOTE — PROGRESS NOTES
"[facility] Medicine Daily Progress Note    Date of Service  11/7/2021    Chief Complaint  Sanam Andre is a 61 y.o. female admitted 11/1/2021 with   Chief Complaint   Patient presents with   • Syncope     Pt. reports syncope this AM, \"my vision went and I fell\". Reports she stayed on the ground for approx 9h- \"My daughter heard me go down but I didn't want her to move me\".    • Knee Pain     R knee- +hx arthritis, pain exacerbated by fall. ROM limited d/t pain, circulation and sensation intact.    • Laceration     Small lac to R low eyebrow- bleeding controlled.         Hospital Course  This is a 61 -year-old female with a past medical history significant for hypertension, LBBB, rheumatoid arthritis, chronic pain, chronic systolic congestive heart failure with EF of 45 %presented to the ER on 11/1/2021 with right lower extremity pain and swelling after a syncopal episode.    She reported that he woke up at 5 in the morning felt lightheaded, lost consciousness. When she woke up she was in extreme pain and not able to stand. X-ray showed Right distal femur periprosthetic fracture with subsided tibial component, Ortho DR Lockett was consulted and paient underwent Open treatment of right femoral supracondylar fracture without intracondylar extension on 11/03.  He did not develop clots per orthopedic surgery recommendation.    Patient is noted to have syncopal episode, echocardiogram was obtained, EF is noted to be 45%, abnormal septal motion consistent with left bundle branch block.  She is noted to have macrocytic anemia along with thrombocytopenia    Interval Problem Update  No acute events overnight, laying in a bed   Denied any complain   Continue to complain og pian in her leg   Vitals stable    I have personally seen and examined the patient at bedside. I discussed the plan of care with patient, bedside RN and charge RN.    11/03:  No acute events overnight, vital signs stable.  Patient will be going for " surgery by Dr. Lockett   --CBC tomorrow patient was transferred to [facility], orders placed    11/04 no acute events overnight, laying in bed continuing to complain. Hemoglobin hematocrit has remained stable, surgery following. PT/OT has evaluated, recommend postacute. Physiatry referral placed with the patient, nurse, case management.    110/5:  --No acute events around surrounding event monitoring,.  She has been medically cleared to go to skilled nursing facility versus rehab.  GI following.  -- hemoglobin and hematocrit has been stable, monitor  --She has questions in regards to the surgery, she talked with Dr. Lockett who  explained over the phone    11/06:  --No acute events overnight, laying in bed, denies any complaint.  Patient could not be discharged  on 11/6 as for pending bed availability.  Case management working on discharging.         consultants/Specialty  orthopedics    Code Status  Full Code    Disposition  Patient is medically cleared   Anticipate discharge to to skilled nursing facility.  I have placed the appropriate orders for post-discharge needs.    Review of Systems  Review of Systems   Constitutional: Positive for malaise/fatigue. Negative for chills.   HENT: Negative for sore throat.    Eyes: Negative for blurred vision and pain.   Respiratory: Negative for cough, hemoptysis and sputum production.    Cardiovascular: Negative for chest pain, orthopnea and leg swelling.   Gastrointestinal: Negative for abdominal pain, constipation and heartburn.   Genitourinary: Negative for dysuria.   Musculoskeletal: Negative for joint pain (Knee) and myalgias.   Neurological: Negative for dizziness, focal weakness and headaches.   Psychiatric/Behavioral: Negative for depression. The patient is nervous/anxious.         Physical Exam  Temp:  [36.1 °C (97 °F)-37.1 °C (98.7 °F)] 36.4 °C (97.6 °F)  Pulse:  [89-96] 95  Resp:  [18-20] 18  BP: (122-148)/(72-99) 148/99  SpO2:  [94 %-95 %] 94 %    Physical Exam  Vitals  and nursing note reviewed.   Constitutional:       Appearance: Normal appearance.   HENT:      Head: Normocephalic and atraumatic.   Eyes:      Pupils: Pupils are equal, round, and reactive to light.   Cardiovascular:      Rate and Rhythm: Normal rate.      Pulses: Normal pulses.   Pulmonary:      Effort: No respiratory distress.      Breath sounds: Normal breath sounds.   Abdominal:      General: Bowel sounds are normal.      Palpations: Abdomen is soft.      Tenderness: There is no abdominal tenderness. There is no guarding.   Musculoskeletal:      Cervical back: Neck supple.      Right lower leg: No edema.      Comments: Decreased range of motion secondary to pain on the right side   Skin:     General: Skin is warm.   Neurological:      Mental Status: She is alert and oriented to person, place, and time.      Cranial Nerves: No cranial nerve deficit.   Psychiatric:         Mood and Affect: Mood normal.         Fluids    Intake/Output Summary (Last 24 hours) at 11/7/2021 0802  Last data filed at 11/7/2021 0400  Gross per 24 hour   Intake 360 ml   Output 1600 ml   Net -1240 ml       Laboratory  Recent Labs     11/05/21  0149   WBC 9.2   RBC 3.02*   HEMOGLOBIN 10.2*   HEMATOCRIT 30.1*   MCV 99.7*   MCH 33.8*   MCHC 33.9   RDW 45.0   PLATELETCT 190   MPV 10.7     Recent Labs     11/05/21  0149   SODIUM 137   POTASSIUM 4.3   CHLORIDE 106   CO2 20   GLUCOSE 113*   BUN 19   CREATININE 0.71   CALCIUM 9.3                   Imaging  DX-FEMUR-2+ RIGHT   Final Result      Intraoperative image(s) as described.      DX-PORTABLE FLUOROSCOPY < 1 HOUR Is the patient pregnant? No   Final Result      Portable fluoroscopy utilized for 34.4 seconds.         INTERPRETING LOCATION: [address] [place] [address], [facility]      EC-ECHOCARDIOGRAM COMPLETE W/O CONT   Final Result      DX-HIP-UNILATERAL-WITH PELVIS-1 VIEW RIGHT   Final Result      1.  No acute fracture is identified.      DX-KNEE 2- RIGHT   Final Result   Addendum 1 of 1   Addendum:       Report dictated on the wrong exam.      There is a fracture of the distal right femoral metaphysis above the knee    arthroplasty. There is posterior angulation of the tibial component. There    is overlying soft tissue edema.      Impression:      Fracture of the distal femoral metaphysis, just superior to the femoral    component of the tricomponent knee arthroplasty      Final      Status post left total knee arthroplasty.      DX-CHEST-PORTABLE (1 VIEW)   Final Result      Medial right basilar opacity may represent early pneumonia or diaphragmatic prominence seen on recent CT. PA and lateral views of the chest may be helpful for further evaluation.      CT-HEAD W/O   Final Result      No acute intracranial abnormality.           Assessment/Plan  * Femoral fracture (HCC)- (present on admission)  Assessment & Plan  X-ray showed Right distal femur periprosthetic fracture with subsided tibial component, Ortho DR Lockett was consulted and joe underwent Open treatment of right femoral supracondylar fracture without intracondylar extension on 11/03.  , PT/OT has recommended postacute,  snf referral has been place  Medically cleared to be discharged    Chronic systolic heart failure (HCC)  Assessment & Plan  EF of 45%, continue cardiac diet, I/os.    Daily weight   Fluid restriction    Dyslipidemia- (present on admission)  Assessment & Plan  Resume home atorvastatin    Primary hypertension- (present on admission)  Assessment & Plan  Continue lisinopril-HCTZ  Blood pressure well under, continue as needed antihypertensive medication    DAWSON (acute kidney injury) (HCC)- (present on admission)  Assessment & Plan  Mostly due to dehydration   Improved with IVF   Avoid nephrotoxin    Syncope- (present on admission)  Assessment & Plan  Noted to have a left bundle branch block which has been chronic.  Echocardiogram showed EF of 45%, abnormal septal motion consistent with LBBB  Telemetry monitoring showed bradycardia,  left cardiovascular, no obvious arrhythmia noted  Patient denied feeling lightheadedness    Thrombocytopenia (HCC)- (present on admission)  Assessment & Plan  Resolved, today at 191, monitor    Hx of Methadone dependence (HCC)- (present on admission)  Assessment & Plan  Currently off methadone  Provide oxy    Hypokalemia- (present on admission)  Assessment & Plan  Replete and monitor       VTE prophylaxis: enoxaparin ppx

## 2021-11-07 NOTE — PROGRESS NOTES
"[facility] Medicine Daily Progress Note    Date of Service  11/7/2021    Chief Complaint  Sanam Andre is a 61 y.o. female admitted 11/1/2021 with   Chief Complaint   Patient presents with   • Syncope     Pt. reports syncope this AM, \"my vision went and I fell\". Reports she stayed on the ground for approx 9h- \"My daughter heard me go down but I didn't want her to move me\".    • Knee Pain     R knee- +hx arthritis, pain exacerbated by fall. ROM limited d/t pain, circulation and sensation intact.    • Laceration     Small lac to R low eyebrow- bleeding controlled.         Hospital Course  This is a 61 -year-old female with a past medical history significant for hypertension, LBBB, rheumatoid arthritis, chronic pain, chronic systolic congestive heart failure with EF of 45 %presented to the ER on 11/1/2021 with right lower extremity pain and swelling after a syncopal episode.    She reported that he woke up at 5 in the morning felt lightheaded, lost consciousness. When she woke up she was in extreme pain and not able to stand. X-ray showed Right distal femur periprosthetic fracture with subsided tibial component, Ortho DR Lockett was consulted and paient underwent Open treatment of right femoral supracondylar fracture without intracondylar extension on 11/03.  He did not develop clots per orthopedic surgery recommendation.    Patient is noted to have syncopal episode, echocardiogram was obtained, EF is noted to be 45%, abnormal septal motion consistent with left bundle branch block.  She is noted to have macrocytic anemia along with thrombocytopenia    Interval Problem Update  No acute events overnight, laying in a bed   Denied any complain   Continue to complain og pian in her leg   Vitals stable    I have personally seen and examined the patient at bedside. I discussed the plan of care with patient, bedside RN and charge RN.    11/03:  No acute events overnight, vital signs stable.  Patient will be going for " surgery by Dr. Lockett   --CBC tomorrow patient was transferred to [facility], orders placed    11/04 no acute events overnight, laying in bed continuing to complain. Hemoglobin hematocrit has remained stable, surgery following. PT/OT has evaluated, recommend postacute. Physiatry referral placed with the patient, nurse, case management.    110/5:  --No acute events around surrounding event monitoring,.  She has been medically cleared to go to skilled nursing facility versus rehab.  GI following.  -- hemoglobin and hematocrit has been stable, monitor  --She has questions in regards to the surgery, she talked with Dr. Lockett who  explained over the phone    11/06:  --No acute events overnight, laying in bed, denies any complaint.  Patient could not be discharged  on 11/6 as for pending bed availability.  Case management working on discharging.    11/07:  --No acute events overnight morning discussion facility versus rehab.  Case management working.  Orthopedic surgery following.  Weightbearing as per orthopedic surgery recommendation       consultants/Specialty  orthopedics    Code Status  Full Code    Disposition  Patient is medically cleared   Anticipate discharge to to skilled nursing facility.  I have placed the appropriate orders for post-discharge needs.    Review of Systems  Review of Systems   Constitutional: Positive for malaise/fatigue. Negative for chills.   HENT: Negative for sore throat.    Eyes: Negative for blurred vision and pain.   Respiratory: Negative for cough, hemoptysis and sputum production.    Cardiovascular: Negative for chest pain, orthopnea and leg swelling.   Gastrointestinal: Negative for abdominal pain, constipation and heartburn.   Genitourinary: Negative for dysuria.   Musculoskeletal: Negative for joint pain (Knee) and myalgias.   Neurological: Negative for dizziness, focal weakness and headaches.   Psychiatric/Behavioral: Negative for depression. The patient is nervous/anxious.          Physical Exam  Temp:  [36.1 °C (97 °F)-37.1 °C (98.7 °F)] 36.4 °C (97.6 °F)  Pulse:  [89-96] 95  Resp:  [18-20] 18  BP: (122-148)/(72-99) 148/99  SpO2:  [94 %-95 %] 94 %    Physical Exam  Vitals and nursing note reviewed.   Constitutional:       Appearance: Normal appearance.   HENT:      Head: Normocephalic and atraumatic.   Eyes:      Pupils: Pupils are equal, round, and reactive to light.   Cardiovascular:      Rate and Rhythm: Normal rate.      Pulses: Normal pulses.   Pulmonary:      Effort: No respiratory distress.      Breath sounds: Normal breath sounds.   Abdominal:      General: Bowel sounds are normal.      Palpations: Abdomen is soft.      Tenderness: There is no abdominal tenderness. There is no guarding.   Musculoskeletal:      Cervical back: Neck supple.      Right lower leg: No edema.      Comments: Decreased range of motion secondary to pain on the right side   Skin:     General: Skin is warm.   Neurological:      Mental Status: She is alert and oriented to person, place, and time.      Cranial Nerves: No cranial nerve deficit.   Psychiatric:         Mood and Affect: Mood normal.         Fluids    Intake/Output Summary (Last 24 hours) at 11/7/2021 0833  Last data filed at 11/7/2021 0400  Gross per 24 hour   Intake 360 ml   Output 1600 ml   Net -1240 ml       Laboratory  Recent Labs     11/05/21  0149   WBC 9.2   RBC 3.02*   HEMOGLOBIN 10.2*   HEMATOCRIT 30.1*   MCV 99.7*   MCH 33.8*   MCHC 33.9   RDW 45.0   PLATELETCT 190   MPV 10.7     Recent Labs     11/05/21  0149   SODIUM 137   POTASSIUM 4.3   CHLORIDE 106   CO2 20   GLUCOSE 113*   BUN 19   CREATININE 0.71   CALCIUM 9.3                   Imaging  DX-FEMUR-2+ RIGHT   Final Result      Intraoperative image(s) as described.      DX-PORTABLE FLUOROSCOPY < 1 HOUR Is the patient pregnant? No   Final Result      Portable fluoroscopy utilized for 34.4 seconds.         INTERPRETING LOCATION: [address] [place] [address], [id number]      EC-ECHOCARDIOGRAM  COMPLETE W/O CONT   Final Result      DX-HIP-UNILATERAL-WITH PELVIS-1 VIEW RIGHT   Final Result      1.  No acute fracture is identified.      DX-KNEE 2- RIGHT   Final Result   Addendum 1 of 1   Addendum:      Report dictated on the wrong exam.      There is a fracture of the distal right femoral metaphysis above the knee    arthroplasty. There is posterior angulation of the tibial component. There    is overlying soft tissue edema.      Impression:      Fracture of the distal femoral metaphysis, just superior to the femoral    component of the tricomponent knee arthroplasty      Final      Status post left total knee arthroplasty.      DX-CHEST-PORTABLE (1 VIEW)   Final Result      Medial right basilar opacity may represent early pneumonia or diaphragmatic prominence seen on recent CT. PA and lateral views of the chest may be helpful for further evaluation.      CT-HEAD W/O   Final Result      No acute intracranial abnormality.           Assessment/Plan  * Femoral fracture (HCC)- (present on admission)  Assessment & Plan  X-ray showed Right distal femur periprosthetic fracture with subsided tibial component, Ortho DR Lockett was consulted and paient underwent Open treatment of right femoral supracondylar fracture without intracondylar extension on 11/03.  , PT/OT has recommended postacute,  snf referral has been place  Medically cleared to be discharged    Chronic systolic heart failure (HCC)  Assessment & Plan  Not  On Exacerbation, monitor   EF of 45%, continue cardiac diet, I/os.    Daily weight   Fluid restriction    Dyslipidemia- (present on admission)  Assessment & Plan  Resume home atorvastatin    Primary hypertension- (present on admission)  Assessment & Plan  Continue lisinopril-  Patient blood pressure noted to be well controlled, monitor    DAWSON (acute kidney injury) (HCC)- (present on admission)  Assessment & Plan  Mostly due to dehydration   Improved with IVF   Avoid nephrotoxin    Syncope- (present on  admission)  Assessment & Plan  Noted to have a left bundle branch block which has been chronic.  Echocardiogram showed EF of 45%, abnormal septal motion consistent with LBBB  Telemetry monitoring showed bradycardia, left cardiovascular, no obvious arrhythmia noted  Patient denied feeling lightheadedness    Thrombocytopenia (HCC)- (present on admission)  Assessment & Plan  Resolved, today at 191, monitor    Hx of Methadone dependence (HCC)- (present on admission)  Assessment & Plan  Currently off methadone  Provide oxy    Hypokalemia- (present on admission)  Assessment & Plan  Replete and monitor  Obtain potassium       VTE prophylaxis: enoxaparin ppx    I have performed a physical exam and reviewed and updated ROS and Plan today (11/7/2021). In review of yesterday's note (11/6/2021), there are no changes except as documented above.

## 2021-11-07 NOTE — CARE PLAN
The patient is Stable - Low risk of patient condition declining or worsening    Shift Goals  Clinical Goals: pain control   Patient Goals: pt will rest    Progress made toward(s) clinical / shift goals:    Problem: Pain - Standard  Goal: Alleviation of pain or a reduction in pain to the patient’s comfort goal  Outcome: Progressing  Note: Pain managed per MAR       Problem: Knowledge Deficit - Standard  Goal: Patient and family/care givers will demonstrate understanding of plan of care, disease process/condition, diagnostic tests and medications  Outcome: Progressing  Note: Reviewed plan of care with pt, pt verbalized understanding, no questions at this time.      Problem: Post-Operative Knee Replacement  Goal: Patient's neurovascular status will be maintained or improve  Outcome: Progressing       Patient is not progressing towards the following goals:

## 2021-11-08 VITALS
HEIGHT: 61 IN | HEART RATE: 98 BPM | DIASTOLIC BLOOD PRESSURE: 71 MMHG | TEMPERATURE: 98.1 F | RESPIRATION RATE: 18 BRPM | SYSTOLIC BLOOD PRESSURE: 109 MMHG | OXYGEN SATURATION: 93 % | WEIGHT: 115.96 LBS | BODY MASS INDEX: 21.89 KG/M2

## 2021-11-08 PROBLEM — E87.6 HYPOKALEMIA: Status: RESOLVED | Noted: 2021-08-29 | Resolved: 2021-11-08

## 2021-11-08 PROBLEM — R55 SYNCOPE: Status: RESOLVED | Noted: 2021-11-01 | Resolved: 2021-11-08

## 2021-11-08 PROBLEM — D69.6 THROMBOCYTOPENIA (HCC): Status: RESOLVED | Noted: 2021-08-29 | Resolved: 2021-11-08

## 2021-11-08 PROBLEM — N17.9 AKI (ACUTE KIDNEY INJURY) (HCC): Status: RESOLVED | Noted: 2021-11-01 | Resolved: 2021-11-08

## 2021-11-08 PROBLEM — S72.90XA FEMORAL FRACTURE (HCC): Status: RESOLVED | Noted: 2021-11-01 | Resolved: 2021-11-08

## 2021-11-08 LAB
ALBUMIN SERPL BCP-MCNC: 2.8 G/DL (ref 3.2–4.9)
BASOPHILS # BLD AUTO: 1.1 % (ref 0–1.8)
BASOPHILS # BLD: 0.07 K/UL (ref 0–0.12)
BUN SERPL-MCNC: 20 MG/DL (ref 8–22)
CALCIUM SERPL-MCNC: 9.6 MG/DL (ref 8.4–10.2)
CHLORIDE SERPL-SCNC: 107 MMOL/L (ref 96–112)
CO2 SERPL-SCNC: 23 MMOL/L (ref 20–33)
CREAT SERPL-MCNC: 0.72 MG/DL (ref 0.5–1.4)
EOSINOPHIL # BLD AUTO: 0.49 K/UL (ref 0–0.51)
EOSINOPHIL NFR BLD: 7.5 % (ref 0–6.9)
ERYTHROCYTE [DISTWIDTH] IN BLOOD BY AUTOMATED COUNT: 46.7 FL (ref 35.9–50)
GLUCOSE SERPL-MCNC: 87 MG/DL (ref 65–99)
HCT VFR BLD AUTO: 33.5 % (ref 37–47)
HGB BLD-MCNC: 10.9 G/DL (ref 12–16)
IMM GRANULOCYTES # BLD AUTO: 0.09 K/UL (ref 0–0.11)
IMM GRANULOCYTES NFR BLD AUTO: 1.4 % (ref 0–0.9)
LYMPHOCYTES # BLD AUTO: 2.19 K/UL (ref 1–4.8)
LYMPHOCYTES NFR BLD: 33.6 % (ref 22–41)
MCH RBC QN AUTO: 33 PG (ref 27–33)
MCHC RBC AUTO-ENTMCNC: 32.5 G/DL (ref 33.6–35)
MCV RBC AUTO: 101.5 FL (ref 81.4–97.8)
MONOCYTES # BLD AUTO: 0.52 K/UL (ref 0–0.85)
MONOCYTES NFR BLD AUTO: 8 % (ref 0–13.4)
NEUTROPHILS # BLD AUTO: 3.15 K/UL (ref 2–7.15)
NEUTROPHILS NFR BLD: 48.4 % (ref 44–72)
NRBC # BLD AUTO: 0 K/UL
NRBC BLD-RTO: 0 /100 WBC
PHOSPHATE SERPL-MCNC: 5.5 MG/DL (ref 2.5–4.5)
PLATELET # BLD AUTO: 271 K/UL (ref 164–446)
PMV BLD AUTO: 9.7 FL (ref 9–12.9)
POTASSIUM SERPL-SCNC: 4.7 MMOL/L (ref 3.6–5.5)
RBC # BLD AUTO: 3.3 M/UL (ref 4.2–5.4)
SODIUM SERPL-SCNC: 141 MMOL/L (ref 135–145)
WBC # BLD AUTO: 6.5 K/UL (ref 4.8–10.8)

## 2021-11-08 PROCEDURE — 99239 HOSP IP/OBS DSCHRG MGMT >30: CPT | Performed by: HOSPITALIST

## 2021-11-08 PROCEDURE — A9270 NON-COVERED ITEM OR SERVICE: HCPCS | Performed by: NURSE PRACTITIONER

## 2021-11-08 PROCEDURE — 80069 RENAL FUNCTION PANEL: CPT

## 2021-11-08 PROCEDURE — 97530 THERAPEUTIC ACTIVITIES: CPT

## 2021-11-08 PROCEDURE — 700102 HCHG RX REV CODE 250 W/ 637 OVERRIDE(OP): Performed by: NURSE PRACTITIONER

## 2021-11-08 PROCEDURE — 36415 COLL VENOUS BLD VENIPUNCTURE: CPT

## 2021-11-08 PROCEDURE — 85025 COMPLETE CBC W/AUTO DIFF WBC: CPT

## 2021-11-08 PROCEDURE — 700102 HCHG RX REV CODE 250 W/ 637 OVERRIDE(OP): Performed by: ORTHOPAEDIC SURGERY

## 2021-11-08 PROCEDURE — 700102 HCHG RX REV CODE 250 W/ 637 OVERRIDE(OP): Performed by: HOSPITALIST

## 2021-11-08 PROCEDURE — A9270 NON-COVERED ITEM OR SERVICE: HCPCS | Performed by: HOSPITALIST

## 2021-11-08 PROCEDURE — A9270 NON-COVERED ITEM OR SERVICE: HCPCS | Performed by: ORTHOPAEDIC SURGERY

## 2021-11-08 PROCEDURE — 700111 HCHG RX REV CODE 636 W/ 250 OVERRIDE (IP): Performed by: ORTHOPAEDIC SURGERY

## 2021-11-08 RX ADMIN — KETOROLAC TROMETHAMINE 10 MG: 10 TABLET, FILM COATED ORAL at 14:10

## 2021-11-08 RX ADMIN — DOCUSATE SODIUM 100 MG: 100 CAPSULE, LIQUID FILLED ORAL at 05:27

## 2021-11-08 RX ADMIN — OXYCODONE HYDROCHLORIDE 10 MG: 10 TABLET ORAL at 01:01

## 2021-11-08 RX ADMIN — OXYCODONE HYDROCHLORIDE 10 MG: 10 TABLET ORAL at 09:47

## 2021-11-08 RX ADMIN — ENOXAPARIN SODIUM 40 MG: 40 INJECTION SUBCUTANEOUS at 00:33

## 2021-11-08 RX ADMIN — GABAPENTIN 300 MG: 300 CAPSULE ORAL at 11:47

## 2021-11-08 RX ADMIN — OXYCODONE HYDROCHLORIDE 10 MG: 10 TABLET ORAL at 14:11

## 2021-11-08 RX ADMIN — ACETAMINOPHEN 650 MG: 325 TABLET, FILM COATED ORAL at 00:33

## 2021-11-08 RX ADMIN — ACETAMINOPHEN 650 MG: 325 TABLET, FILM COATED ORAL at 11:47

## 2021-11-08 RX ADMIN — OXYCODONE HYDROCHLORIDE 10 MG: 10 TABLET ORAL at 06:27

## 2021-11-08 RX ADMIN — ACETAMINOPHEN 650 MG: 325 TABLET, FILM COATED ORAL at 05:27

## 2021-11-08 RX ADMIN — GABAPENTIN 300 MG: 300 CAPSULE ORAL at 05:27

## 2021-11-08 ASSESSMENT — COGNITIVE AND FUNCTIONAL STATUS - GENERAL
MOBILITY SCORE: 12
STANDING UP FROM CHAIR USING ARMS: A LITTLE
CLIMB 3 TO 5 STEPS WITH RAILING: TOTAL
TURNING FROM BACK TO SIDE WHILE IN FLAT BAD: A LITTLE
MOVING TO AND FROM BED TO CHAIR: A LOT
WALKING IN HOSPITAL ROOM: TOTAL
SUGGESTED CMS G CODE MODIFIER MOBILITY: CL
MOVING FROM LYING ON BACK TO SITTING ON SIDE OF FLAT BED: A LOT

## 2021-11-08 ASSESSMENT — PAIN DESCRIPTION - PAIN TYPE
TYPE: SURGICAL PAIN
TYPE: SURGICAL PAIN
TYPE: ACUTE PAIN
TYPE: ACUTE PAIN
TYPE: SURGICAL PAIN
TYPE: SURGICAL PAIN

## 2021-11-08 ASSESSMENT — FIBROSIS 4 INDEX: FIB4 SCORE: 1

## 2021-11-08 ASSESSMENT — GAIT ASSESSMENTS
GAIT LEVEL OF ASSIST: UNABLE TO PARTICIPATE
DISTANCE (FEET): 2

## 2021-11-08 NOTE — DISCHARGE PLANNING
Agency/Facility Name: [facility]  Spoke To: Karina  Outcome: Pt may be accepted today, [facility] offering 1500 transport time.     DPA to verify Pt acceptance with SW and follow up with Karina @ [phone]    1003-  Agency/Facility Name: [facility]  Spoke To: Karina   Outcome: DPA confirmed Pt acceptance and transport time of 1500 via [facility] transportation.

## 2021-11-08 NOTE — CARE PLAN
The patient is Stable - Low risk of patient condition declining or worsening    Shift Goals  Clinical Goals: Patients pain will be controlled within the shift    Progress made toward(s) clinical / shift goals:  Patient has been given pain medication per order. Patient said that her pain goes down to a 5 which is tolerable. Patient also get out of bed and sit on her recliner with PT and OT.      Problem: Knowledge Deficit - Standard  Goal: Patient and family/care givers will demonstrate understanding of plan of care, disease process/condition, diagnostic tests and medications  Outcome: Progressing     Problem: Skin Integrity  Goal: Skin integrity is maintained or improved  Outcome: Progressing

## 2021-11-08 NOTE — CARE PLAN
Problem: Pain - Standard  Goal: Alleviation of pain or a reduction in pain to the patient’s comfort goal  Outcome: Progressing     Problem: Skin Integrity  Goal: Skin integrity is maintained or improved  Outcome: Progressing      The patient is Stable - Low risk of patient condition declining or worsening    Shift Goals  Clinical Goals: pain control  Patient Goals: pt will rest    Progress made toward(s) clinical / shift goals:  Pain assessed and managed throughout shift. Pt was given bed bath with family to help maintain skin integrity. Complete linen change done.     Patient is not progressing towards the following goals:

## 2021-11-08 NOTE — CARE PLAN
The patient is Stable - Low risk of patient condition declining or worsening    Shift Goals  Clinical Goals: Patients pain will be controlled within the shift.  Patient Goals: pt will rest    Progress made toward(s) clinical / shift goals:  Patient has been given pain medication as ordered. He said the lowest pain level she got was a 5/10 with pain medication which she said is tolerable. She said she is unable to get out of bed because the right leg where the surgery was her good leg before. The left leg wasn't able to hold her weight prior to surgery due to arthritis.      Problem: Knowledge Deficit - Standard  Goal: Patient and family/care givers will demonstrate understanding of plan of care, disease process/condition, diagnostic tests and medications  Outcome: Progressing     Problem: Skin Integrity  Goal: Skin integrity is maintained or improved  Outcome: Progressing

## 2021-11-08 NOTE — DISCHARGE PLANNING
Anticipated Discharge Disposition: SNF     Action: Pt discussed during morning rounds. Per  pt is medically cleared. Epic showing that pt has been accepted by [facility], [facility], and [facility].     LSW called [facility] to f/u with bed availability and no answer. LSW left a voicemail with name and number for call back.     Barriers to Discharge: SNF bed availability     Plan: Await SNF bed availability, LSW to continue to follow for d/c needs     Addendum 0945  LSW informed by [facility] that they have beds available today. LSW informed [facility] that pt is medically cleared and appropriate for wheelchair transport. [facility] to call back with further transport details.

## 2021-11-08 NOTE — DISCHARGE SUMMARY
"Discharge Summary    CHIEF COMPLAINT ON ADMISSION  Chief Complaint   Patient presents with   • Syncope     Pt. reports syncope this AM, \"my vision went and I fell\". Reports she stayed on the ground for approx 9h- \"My daughter heard me go down but I didn't want her to move me\".    • Knee Pain     R knee- +hx arthritis, pain exacerbated by fall. ROM limited d/t pain, circulation and sensation intact.    • Laceration     Small lac to R low eyebrow- bleeding controlled.       Reason for Admission  EMS     Admission Date  11/1/2021    CODE STATUS  Full Code    HPI & HOSPITAL COURSE  This is a 61 -year-old female with a past medical history significant for hypertension, LBBB, rheumatoid arthritis, chronic pain, chronic systolic congestive heart failure with EF of 45 %presented to the ER on 11/1/2021 with right lower extremity pain and swelling after a syncopal episode.     She reported that he woke up at 5 am, felt lightheaded, lost consciousness. When she woke up she was in extreme pain and not able to stand. X-ray showed Right distal femur periprosthetic fracture with subsided tibial component, Ortho DR Lockett was consulted and paient underwent Open treatment of right femoral supracondylar fracture without intracondylar extension on 11/03.  Dr. Lockett recommended toe-touch weightbearing right lower extremity, patient need to use a knee immobilizer at all times.     Patient is noted to have syncopal episode, echocardiogram was obtained, EF is noted to be 45%, abnormal septal motion consistent with left bundle branch block.  During the stay in the hospital she was monitored in telemetry, continue to have left bundle branch block, no other arrhythmia noted.  She denies any lightheadedness. She will continue lisinopril 5 mg po qd and aspirin.     Thrombocytopenia has resolved.  PT/OT has evaluated the patient and recommended postacute, atTherefore, she is discharged in fair and stable condition to skilled nursing " facility.     The patient met 2-midnight criteria for an inpatient stay at the time of discharge.     Therefore, she is discharged in fair and stable condition to skilled nursing facility.    The patient met 2-midnight criteria for an inpatient stay at the time of discharge.    Discharge Date  11/08/2021    FOLLOW UP ITEMS POST DISCHARGE  RUDDY Avitia      DISCHARGE DIAGNOSES  Principal Problem:    Femoral fracture (HCC) POA: Yes  Active Problems:    Hypokalemia POA: Yes    Hx of Methadone dependence (HCC) POA: Yes    Thrombocytopenia (HCC) POA: Yes    Syncope POA: Yes    DAWSON (acute kidney injury) (HCC) POA: Yes    Primary hypertension POA: Yes    Dyslipidemia POA: Yes    Chronic systolic heart failure (HCC) POA: Unknown  Resolved Problems:    * No resolved hospital problems. *      FOLLOW UP  No future appointments.  RUDDY Avitia  [address] [facility] [id number]  [phone]      Please call your primary care provider to schedule a hospital follow up. Thank you      MEDICATIONS ON DISCHARGE     Medication List      START taking these medications      Instructions   enoxaparin 40 MG/0.4ML Soln inj  Commonly known as: LOVENOX   Inject 40 mg under the skin every day for 24 days.  Dose: 40 mg     furosemide 20 MG Tabs  Commonly known as: LASIX   Take 1 Tablet by mouth every day for 30 days.  Dose: 20 mg     lisinopril 5 MG Tabs  Commonly known as: PRINIVIL   Take 1 Tablet by mouth every day.  Dose: 5 mg     polyethylene glycol/lytes 17 g Pack  Commonly known as: MIRALAX   Take 1 Packet by mouth 2 times a day as needed (if sennosides and/or docusate ineffective or not ordered).  Dose: 17 g        CHANGE how you take these medications      Instructions   oxyCODONE immediate release 10 MG immediate release tablet  What changed: when to take this  Commonly known as: ROXICODONE   Take 1 Tablet by mouth every 6 hours as needed for Moderate Pain for up to 2 days.  Dose: 10 mg       "  CONTINUE taking these medications      Instructions   acetaminophen 325 MG Tabs  Commonly known as: Tylenol   Take 2 Tablets by mouth every 6 hours as needed.  Dose: 650 mg     aspirin 81 MG EC tablet   Take 1 Tablet by mouth every day.  Dose: 81 mg     atorvastatin 40 MG Tabs  Commonly known as: LIPITOR   Take 1 Tablet by mouth every evening.  Dose: 40 mg     gabapentin 300 MG Caps  Commonly known as: NEURONTIN   Take 300-600 mg by mouth 3 times a day. 300mg in the morning and at noon and 600mg at bedtime  Dose: 300-600 mg        STOP taking these medications    disulfiram 250 MG Tabs  Commonly known as: ANTABUSE     ibuprofen 800 MG Tabs  Commonly known as: MOTRIN     lisinopril-hydrochlorothiazide 20-25 MG per tablet  Commonly known as: PRINZIDE            Allergies  Allergies   Allergen Reactions   • Toradol [Ketorolac Tromethamine]      Patient reports \"severe headache for 2 hours\" post administration   • Codeine Vomiting   • Iodine Anaphylaxis     Reaction took place during CT and IV dye injection       DIET  Orders Placed This Encounter   Procedures   • Diet Order Diet: Regular     Standing Status:   Standing     Number of Occurrences:   1     Order Specific Question:   Diet:     Answer:   Regular [1]     ACTIVITY  As tolerated by rehab.  Toe-touch weightbearing on the right lower extremity, knee immobilizer at all place.     CONSULTATIONS  Ortho     PROCEDURES  Open treatment of right femoral supracondylar fracture without intracondylar extension  On 11/03/2021     LABORATORY  Lab Results   Component Value Date    SODIUM 141 11/08/2021    POTASSIUM 4.7 11/08/2021    CHLORIDE 107 11/08/2021    CO2 23 11/08/2021    GLUCOSE 87 11/08/2021    BUN 20 11/08/2021    CREATININE 0.72 11/08/2021    CREATININE 0.7 04/06/2008        Lab Results   Component Value Date    WBC 6.5 11/08/2021    HEMOGLOBIN 10.9 (L) 11/08/2021    HEMATOCRIT 33.5 (L) 11/08/2021    PLATELETCT 271 11/08/2021        Total time of the discharge " process exceeds 35minutes.

## 2021-11-08 NOTE — DISCHARGE INSTRUCTIONS
Discharge Instructions    Discharged to [facility] home by medical transportation with escort. Discharged via wheelchair, hospital escort: Yes.  Special equipment needed: Not Applicable    Be sure to schedule a follow-up appointment with your primary care doctor or any specialists as instructed.     Discharge Plan:   Diet Plan: Discussed  Activity Level: Discussed  Confirmed Follow up Appointment: Patient to Call and Schedule Appointment  Confirmed Symptoms Management: Discussed  Medication Reconciliation Updated: Yes    I understand that a diet low in cholesterol, fat, and sodium is recommended for good health. Unless I have been given specific instructions below for another diet, I accept this instruction as my diet prescription.   Other diet: Regular    Special Instructions: Discharge instructions for the Orthopedic Patient    Follow up with Primary Care Physician within 2 weeks of discharge to home, regarding:  Review of medications and diagnostic testing.  Surveillance for medical complications.  Workup and treatment of osteoporosis, if appropriate.     -Is this a Hip/Knee/Shoulder Joint Replacement patient? No    -Is this patient being discharged with medication to prevent blood clots?  Yes, Lovenox Enoxaparin injection  What is this medicine?  ENOXAPARIN (ee nox a PA rin) is used after knee, hip, or abdominal surgeries to prevent blood clotting. It is also used to treat existing blood clots in the lungs or in the veins.  This medicine may be used for other purposes; ask your health care provider or pharmacist if you have questions.  COMMON BRAND NAME(S): Lovenox  What should I tell my health care provider before I take this medicine?  They need to know if you have any of these conditions:  · bleeding disorders, hemorrhage, or hemophilia  · infection of the heart or heart valves  · kidney or liver disease  · previous stroke  · prosthetic heart valve  · recent surgery or delivery of a baby  · ulcer in the stomach or  intestine, diverticulitis, or other bowel disease  · an unusual or allergic reaction to enoxaparin, heparin, pork or pork products, other medicines, foods, dyes, or preservatives  · pregnant or trying to get pregnant  · breast-feeding  How should I use this medicine?  This medicine is for injection under the skin. It is usually given by a health-care professional. You or a family member may be trained on how to give the injections. If you are to give yourself injections, make sure you understand how to use the syringe, measure the dose if necessary, and give the injection. To avoid bruising, do not rub the site where this medicine has been injected. Do not take your medicine more often than directed. Do not stop taking except on the advice of your doctor or health care professional.  Make sure you receive a puncture-resistant container to dispose of the needles and syringes once you have finished with them. Do not reuse these items. Return the container to your doctor or health care professional for proper disposal.  Talk to your pediatrician regarding the use of this medicine in children. Special care may be needed.  Overdosage: If you think you have taken too much of this medicine contact a poison control center or emergency room at once.  NOTE: This medicine is only for you. Do not share this medicine with others.  What if I miss a dose?  If you miss a dose, take it as soon as you can. If it is almost time for your next dose, take only that dose. Do not take double or extra doses.  What may interact with this medicine?  · aspirin and aspirin-like medicines  · certain medicines that treat or prevent blood clots  · dipyridamole  · NSAIDs, medicines for pain and inflammation, like ibuprofen or naproxen  This list may not describe all possible interactions. Give your health care provider a list of all the medicines, herbs, non-prescription drugs, or dietary supplements you use. Also tell them if you smoke, drink  alcohol, or use illegal drugs. Some items may interact with your medicine.  What should I watch for while using this medicine?  Visit your healthcare professional for regular checks on your progress. You may need blood work done while you are taking this medicine. Your condition will be monitored carefully while you are receiving this medicine. It is important not to miss any appointments.  If you are going to need surgery or other procedure, tell your healthcare professional that you are using this medicine.  Using this medicine for a long time may weaken your bones and increase the risk of bone fractures.  Avoid sports and activities that might cause injury while you are using this medicine. Severe falls or injuries can cause unseen bleeding. Be careful when using sharp tools or knives. Consider using an electric razor. Take special care brushing or flossing your teeth. Report any injuries, bruising, or red spots on the skin to your healthcare professional.  Wear a medical ID bracelet or chain. Carry a card that describes your disease and details of your medicine and dosage times.  What side effects may I notice from receiving this medicine?  Side effects that you should report to your doctor or health care professional as soon as possible:  · allergic reactions like skin rash, itching or hives, swelling of the face, lips, or tongue  · bone pain  · signs and symptoms of bleeding such as bloody or black, tarry stools; red or dark-brown urine; spitting up blood or brown material that looks like coffee grounds; red spots on the skin; unusual bruising or bleeding from the eye, gums, or nose  · signs and symptoms of a blood clot such as chest pain; shortness of breath; pain, swelling, or warmth in the leg  · signs and symptoms of a stroke such as changes in vision; confusion; trouble speaking or understanding; severe headaches; sudden numbness or weakness of the face, arm or leg; trouble walking; dizziness; loss of  coordination  Side effects that usually do not require medical attention (report to your doctor or health care professional if they continue or are bothersome):  · hair loss  · pain, redness, or irritation at site where injected  This list may not describe all possible side effects. Call your doctor for medical advice about side effects. You may report side effects to FDA at 3-451-DSH-1591.  Where should I keep my medicine?  Keep out of the reach of children.  Store at room temperature between 15 and 30 degrees C (59 and 86 degrees F). Do not freeze. If your injections have been specially prepared, you may need to store them in the refrigerator. Ask your pharmacist. Throw away any unused medicine after the expiration date.  NOTE: This sheet is a summary. It may not cover all possible information. If you have questions about this medicine, talk to your doctor, pharmacist, or health care provider.  © 2020 [employer] (2018-12-13 11:25:34)      · Is patient discharged on Warfarin / Coumadin?   No   Open Reduction, Internal Fixation (ORIF), Generic  Usually, if bones are broken (fractured) and are out of place, unstable, or may become out of place, surgery is needed. This surgery is called an open reduction and internal fixation (ORIF). Open reduction means that the area of the fracture is opened up so the surgeon can see it. Internal fixation means that screws, pins, or fixation devices are used to hold the bone pieces in place.  LET YOUR CAREGIVER KNOW ABOUT:   · Allergies.  · Medicines taken, including herbs, eyedrops, over-the-counter medicines, and creams.  · Use of steroids (by mouth or creams).  · Previous problems with anesthetics or numbing medicines.  · History of bleeding or blood problems.  · History of blood clots.  · Possibility of pregnancy, if this applies.  · Previous surgery.  · Other health problems.  RISKS AND COMPLICATIONS   All surgery is associated with risks. Some of these risks  are:  · Excessive bleeding.  · Infection.  · Imperfect results with loss of joint function.  BEFORE THE PROCEDURE   Usually, surgery is performed shortly after the injury. It is important to provide information to your caregiver after your injury.  AFTER THE PROCEDURE   After surgery, you will be taken to a recovery area where a nurse will monitor your progress. You may have a long, narrow tube(catheter) in the bladder following surgery that helps you pass your water. When awake, stable, taking fluids well, and without complications, you will be returned to your room. You will receive physical therapy and other care. Physical therapy is done until you are doing well and your caregiver feels it is safe for you to go home or to an extended care facility.  Following surgery, the bones may be protected with a cast. The type of casting depends on where the fracture was. Casts are generally left in place for about 5 to 6 weeks. During this time, your caregiver will follow your progress. X-rays may be taken during healing to make sure the bones stay in place.  HOME CARE INSTRUCTIONS   · You or your child may resume normal diet and activities as directed or allowed.  · Put ice on the injured area.  · Put ice in a plastic bag.  · Place a towel between the skin and the bag.  · Leave the ice on for 15-20 minutes at a time, 3-4 times a day, for the first 2 days following surgery.  · Change bandages (dressings) if necessary or as directed.  · If given a plaster or fiberglass cast:  · Do not try to scratch the skin under the cast using sharp or pointed objects.  · Check the skin around the cast every day. You may put lotion on any red or sore areas.  · Keep the cast dry and clean.  · Do not put pressure on any part of the cast or splint until it is fully hardened.  · The cast or splint can be protected during bathing with a plastic bag. Do not lower the cast or splint into water.  · Only take over-the-counter or prescription  medicines for pain, discomfort, or fever as directed by your caregiver.  · Use crutches as directed and do not exercise the leg unless instructed.  · If the bones get out of position (displaced), it may eventually lead to arthritis and lasting disability. Problems can follow even the best of care. Follow the directions of your caregiver.  · Follow all instructions given by your caregiver, make and keep follow-up appointments, and use crutches as directed.  SEEK IMMEDIATE MEDICAL CARE IF:   · There is redness, swelling, numbness, or increasing pain in the wound.  · There is pus coming from the wound.  · You or your child has an oral temperature above 102° F (38.9° C), not controlled by medicine.  · A bad smell is coming from the wound or dressing.  · The wound breaks open (edges not staying together) after stitches (sutures) or staples have been removed.  · The skin or nails below the injury turn blue or gray, or feel cold or numb.  · There is severe pain under the cast or in the foot.  If there is not a window in the cast for observing the wound, a discharge or minor bleeding may show up as a stain on the outside of the cast. Report these findings to your caregiver.  MAKE SURE YOU:   · Understand these instructions.  · Will watch your condition.  · Will get help right away if you are not doing well or gets worse.  Document Released: 12/29/2007 Document Revised: 03/11/2013 Document Reviewed: 12/05/2008  ExitCare® Patient Information ©2014 [employer].        Depression / Suicide Risk    As you are discharged from this [facility] facility, it is important to learn how to keep safe from harming yourself.    Recognize the warning signs:  · Abrupt changes in personality, positive or negative- including increase in energy   · Giving away possessions  · Change in eating patterns- significant weight changes-  positive or negative  · Change in sleeping patterns- unable to sleep or sleeping all the time   · Unwillingness  or inability to communicate  · Depression  · Unusual sadness, discouragement and loneliness  · Talk of wanting to die  · Neglect of personal appearance   · Rebelliousness- reckless behavior  · Withdrawal from people/activities they love  · Confusion- inability to concentrate     If you or a loved one observes any of these behaviors or has concerns about self-harm, here's what you can do:  · Talk about it- your feelings and reasons for harming yourself  · Remove any means that you might use to hurt yourself (examples: pills, rope, extension cords, firearm)  · Get professional help from the community (Mental Health, Substance Abuse, psychological counseling)  · Do not be alone:Call your Safe Contact- someone whom you trust who will be there for you.  · Call your local CRISIS HOTLINE 830-6792 or 030-103-6094  · Call your local Children's Mobile Crisis Response Team [place] (150) 020-1841 or www.[employer]  · Call the toll free National Suicide Prevention Hotlines   · National Suicide Prevention Lifeline 222-255-YDCV (9519)  · National Hope Line Network 800-SUICIDE (961-5646)

## 2021-11-08 NOTE — DISCHARGE PLANNING
Anticipated discharge disposition: [facility]    Action: met with pt at bedside to sign cobra and update pt on transport time of 1500 to [facility].   Met with pt at bedside to complete IMM. Pt signed IMM and faxed to [facility]. Copy left with pt at bedside.     Barriers to discharge: no needs     Plan: care coordination will continue to follow with care team for DC needs.

## 2021-11-08 NOTE — THERAPY
"Occupational Therapy  Daily Treatment     Patient Name: Sanam Andre  Age:  61 y.o., Sex:  female  Medical Record #: [id number]  Today's Date: 11/8/2021     Precautions  Precautions: Toe Touch Weight Bearing Right Lower Extremity,Immobilizer Left Lower Extremity  Comments: No ROM R knee    Assessment    Pt making progress with bed mobility and some with transfers.  Still self limiting due to pain and fear.  Able to pivot on LLE to chair but limited in all other ADL's and mobility.  Not able to care for self at home, and flight of stairs to enter home is limiting as well.  Pt will benefit from further inpt post acute therapy prior to home.  OT will follow if not discharged to SNF today.    Plan    Continue current treatment plan.    DC Equipment Recommendations: Unable to determine at this time  Discharge Recommendations: Recommend post-acute placement for additional occupational therapy services prior to discharge home    Subjective    \"I can't use the slideboard.  It doesn't feel safe.  I'm gonna fall.\"     Objective       11/08/21 0927   Cognition    Cognition / Consciousness WDL   Level of Consciousness Alert   Comments anxious, a bit scattered, needs cues to redirect and sequence steps.  Fearful in anticipation of pain   Active ROM Upper Body   Active ROM Upper Body  WDL   Dominant Hand Right   Strength Upper Body   Upper Body Strength  WDL   Sitting Upper Body Exercises   Comments using BUE to scoot in bed and in chair   Other Treatments   Other Treatments Provided cues for safety with transfers, educated on use of shoe on LLE to help make that leg longer and promote easier TTWB on RLE   Balance   Sitting Balance (Static) Good   Sitting Balance (Dynamic) Good   Standing Balance (Static) Fair -   Standing Balance (Dynamic) Fair   Weight Shift Sitting Fair   Weight Shift Standing Poor   Skilled Intervention Verbal Cuing;Tactile Cuing;Postural Facilitation;Sequencing   Comments TTWB RLE   Bed Mobility    Supine " "to Sit Minimal Assist   Scooting Minimal Assist   Activities of Daily Living   Grooming Seated;Modified Independent   Lower Body Dressing Maximal Assist   Functional Mobility   Sit to Stand Moderate Assist   Bed, Chair, Wheelchair Transfer Minimal Assist   Toilet Transfers Unable to Participate   Transfer Method Stand Pivot   Mobility pivot from bed to chair with Ed FWW - pt refused to use SB again, states \"It doesn't feel safe\"   Distance (Feet) 2   # of Times Distance was Traveled 1   Skilled Intervention Verbal Cuing;Sequencing;Postural Facilitation;Compensatory Strategies   Comments cues and demo for transfer, has great difficulty with sit to stand and stand to sit as she doesn't trust the LLE   Activity Tolerance   Sitting in Chair > 20 min   Sitting Edge of Bed 5 min   Standing 1-2 min   Comments limited by pain, weakness in LLE and fear   Patient / Family Goals   Patient / Family Goal #1 rehab   Goal #1 Outcome Progressing as expected   Short Term Goals   Short Term Goal # 1 pt will dress LB from bed level with Ed and AE in 6 visits   Goal Outcome # 1 Progressing as expected   Short Term Goal # 2 Pt will SB transfer to chair for meals with modA in 5 visits   Goal Outcome # 2 Goal not met   Short Term Goal # 2 B  Pt will transfer with Ed within 5 days    Goal Outcome # 2 B Progressing as expected   Short Term Goal # 3 Pt will toilet on BSC with Ed in 6 visits   Goal Outcome # 3 Progressing slower than expected         "

## 2021-11-08 NOTE — THERAPY
Physical Therapy   Daily Treatment     Patient Name: Sanam Andre  Age:  61 y.o., Sex:  female  Medical Record #: [id number]  Today's Date: 11/8/2021     Precautions  Precautions: Toe Touch Weight Bearing Right Lower Extremity;Immobilizer Left Lower Extremity  Comments: No ROM R knee    Assessment    Pt making some progress, pain control improved. Pt with difficulty maintaining true TTWB R LE with transfers due to knee immobilizer. Approp for SNF    Plan    Continue current treatment plan.    DC Equipment Recommendations: Unable to determine at this time  Discharge Recommendations: Recommend post-acute placement for additional physical therapy services prior to discharge home       11/08/21 0929   Cognition    Comments Pt supine, agreeable for PT   Balance   Sitting Balance (Static) Good   Sitting Balance (Dynamic) Fair   Standing Balance (Static) Fair -   Standing Balance (Dynamic) Poor +   Weight Shift Sitting Fair   Weight Shift Standing Poor   Skilled Intervention Postural Facilitation;Sequencing;Tactile Cuing;Verbal Cuing   Comments stdg with FWW   Gait Analysis   Gait Level Of Assist Unable to Participate  (and maintain TTWB R LE)   Bed Mobility    Supine to Sit Minimal Assist   Functional Mobility   Sit to Stand Moderate Assist   Bed, Chair, Wheelchair Transfer Minimal Assist   Transfer Method Stand Pivot  (with FWW)   Activity Tolerance   Standing 3 min   Short Term Goals    Short Term Goal # 1 pt will go supine<>sit w/hob elevated and rails up w/spv in 6tx    Goal Outcome # 1 Progressing as expected   Short Term Goal # 2 pt will go sit<>stand w/fww w/Min A in 6tx    Goal Outcome # 2 Progressing as expected   Short Term Goal # 3 pt will transfer bed<>chair w/SB w/spv in 6tx for meals    Goal Outcome # 3 Progressing as expected   Short Term Goal # 4 pt will mobilize in w/c for 100ft w/spv in 6tx    Goal Outcome # 4 Goal not met

## 2021-11-08 NOTE — PROGRESS NOTES
Reviewed discharge instructions with patient. Patient show understanding of discharge instructions, all questions answered. IV  Dc'd. Patient meet criteria  for discharge. Discharge paper signed. Patient was escorted outside hospital on a wheelchair with transport from [facility].

## 2021-11-11 NOTE — CONSULTS
DATE OF SERVICE:  11/03/2021     REQUESTING PHYSICIAN: This is a patient seen at the request of Dean Sheppard MD.     HISTORY OF PRESENT ILLNESS: This is a 61-year-old female who is status post   right total knee replacement, who fell and sustained a distal femur   periprosthetic fracture and I was consulted for management.  She denies   numbness, paresthesias, loss of consciousness or any other trauma.     PAST MEDICAL HISTORY:  Significant for hypertension.     MEDICATIONS:  Tylenol, Lipitor, Mucinex, Dolophine and Prinivil.     ALLERGIES:  No known drug allergies.     PAST SURGICAL HISTORY:  Right total knee replacement.     FAMILY HISTORY:  Negative for hypertension, diabetes.     REVIEW OF SYSTEMS:  Positive for right knee pain.  All other systems reviewed   and found to be negative.     PHYSICAL EXAMINATION:  VITAL SIGNS:  Temperature 96, pulse 76, respirations 16.  In general, this is   a well-developed, well-nourished female in no distress.  MENTAL STATUS:  The patient is alert and oriented x3.  GCS of 15.  Head is   normocephalic, atraumatic.  Eyes are anicteric.  Pupils equal, round, react to   light and accommodation.  Oropharynx clear.  Mucosa moist.  NECK:  Soft, supple, nontender, no masses.  PULMONARY:  The patient breathing spontaneously, it is unlabored.  Lungs are   clear to auscultation.  HEART:  Regular rate and rhythm.  No murmurs, rubs or gallops.  ABDOMEN:  Soft, nontender, nondistended.  PELVIS: Stable to AP and lateral compression.  EXTREMITIES:  Examination of the right knee demonstrates pain and tenderness   of the distal femur.  She has intact sensation on dorsal surface of foot and   plantar surface of the foot, 2+ dorsalis pedis and posterior tibial pulse.    Cap refill is less than 2 seconds.     DIAGNOSTIC STUDIES:  Radiographs of the knee demonstrated subsided tibial   component and a right nondisplaced distal femur periprosthetic fracture.     ASSESSMENT:  1.  Right  Pt. returned the 2/28/17 call , pt says that he has been waiting since 2/24/17 for PCP to sign his workman's comp forms . Pt is checking on the status of PCP signing the forms .Please Advise.  Please call again at : CELL: [phone]     3/1/17     nondisplaced periprosthetic distal femur fracture.   2.  Subsided tibial component of total knee arthroplasty.     PLAN:  At this point we had a discussion with the patient and with our   arthroplasty service about operative options.  A hinged distal femoral   replacement is certainly possible, but would have a high failure rate as the   patient of her young age; as a result we will consent her for a staged   procedure, doing ORIF of her distal femur primarily, allow this to heal,   followed by revision total knee replacement.        ______________________________  Terrence Rendon MD PLA/ERENDIRA/VICKI    DD:  11/10/2021 20:51  DT:  11/10/2021 21:25    Job#:  [id number]

## 2023-02-27 ENCOUNTER — HOSPITAL ENCOUNTER (INPATIENT)
Facility: MEDICAL CENTER | Age: 63
LOS: 2 days | DRG: 291 | End: 2023-03-01
Attending: EMERGENCY MEDICINE | Admitting: STUDENT IN AN ORGANIZED HEALTH CARE EDUCATION/TRAINING PROGRAM
Payer: MEDICARE

## 2023-02-27 ENCOUNTER — APPOINTMENT (OUTPATIENT)
Dept: RADIOLOGY | Facility: MEDICAL CENTER | Age: 63
DRG: 291 | End: 2023-02-27
Attending: EMERGENCY MEDICINE
Payer: MEDICARE

## 2023-02-27 ENCOUNTER — APPOINTMENT (OUTPATIENT)
Dept: RADIOLOGY | Facility: MEDICAL CENTER | Age: 63
DRG: 291 | End: 2023-02-27
Payer: MEDICARE

## 2023-02-27 DIAGNOSIS — I50.20 HEART FAILURE WITH REDUCED EJECTION FRACTION (HCC): ICD-10-CM

## 2023-02-27 DIAGNOSIS — N39.0 ACUTE UTI: ICD-10-CM

## 2023-02-27 DIAGNOSIS — R41.82 ALTERED MENTAL STATUS, UNSPECIFIED ALTERED MENTAL STATUS TYPE: ICD-10-CM

## 2023-02-27 DIAGNOSIS — I50.9 ACUTE CONGESTIVE HEART FAILURE, UNSPECIFIED HEART FAILURE TYPE (HCC): ICD-10-CM

## 2023-02-27 DIAGNOSIS — R53.1 WEAKNESS: ICD-10-CM

## 2023-02-27 PROBLEM — R32 URINARY INCONTINENCE: Status: ACTIVE | Noted: 2023-02-27

## 2023-02-27 PROBLEM — R82.4 KETONURIA: Status: ACTIVE | Noted: 2023-02-27

## 2023-02-27 PROBLEM — Z78.9 ALCOHOL USE: Status: ACTIVE | Noted: 2023-02-27

## 2023-02-27 PROBLEM — G89.29 PAIN, CHRONIC: Status: ACTIVE | Noted: 2023-02-27

## 2023-02-27 PROBLEM — R79.89 D-DIMER, ELEVATED: Status: ACTIVE | Noted: 2023-02-27

## 2023-02-27 PROBLEM — E87.8 LOW BICARBONATE: Status: ACTIVE | Noted: 2023-02-27

## 2023-02-27 LAB
ALBUMIN SERPL BCP-MCNC: 3.7 G/DL (ref 3.2–4.9)
ALBUMIN/GLOB SERPL: 0.8 G/DL
ALP SERPL-CCNC: 108 U/L (ref 30–99)
ALT SERPL-CCNC: 36 U/L (ref 2–50)
AMMONIA PLAS-SCNC: 12 UMOL/L (ref 11–45)
AMPHET UR QL SCN: NEGATIVE
ANION GAP SERPL CALC-SCNC: 16 MMOL/L (ref 7–16)
APPEARANCE UR: CLEAR
AST SERPL-CCNC: 77 U/L (ref 12–45)
BACTERIA #/AREA URNS HPF: NEGATIVE /HPF
BARBITURATES UR QL SCN: NEGATIVE
BASOPHILS # BLD AUTO: 0.1 % (ref 0–1.8)
BASOPHILS # BLD: 0.01 K/UL (ref 0–0.12)
BENZODIAZ UR QL SCN: NEGATIVE
BILIRUB SERPL-MCNC: 0.9 MG/DL (ref 0.1–1.5)
BILIRUB UR QL STRIP.AUTO: NEGATIVE
BUN SERPL-MCNC: 26 MG/DL (ref 8–22)
BZE UR QL SCN: NEGATIVE
CALCIUM ALBUM COR SERPL-MCNC: 11 MG/DL (ref 8.5–10.5)
CALCIUM SERPL-MCNC: 10.8 MG/DL (ref 8.5–10.5)
CANNABINOIDS UR QL SCN: POSITIVE
CHLORIDE SERPL-SCNC: 106 MMOL/L (ref 96–112)
CO2 SERPL-SCNC: 15 MMOL/L (ref 20–33)
COLOR UR: ABNORMAL
CREAT SERPL-MCNC: 0.58 MG/DL (ref 0.5–1.4)
D DIMER PPP IA.FEU-MCNC: 2.04 UG/ML (FEU) (ref 0–0.5)
EKG IMPRESSION: NORMAL
EOSINOPHIL # BLD AUTO: 0.08 K/UL (ref 0–0.51)
EOSINOPHIL NFR BLD: 1.2 % (ref 0–6.9)
EPI CELLS #/AREA URNS HPF: ABNORMAL /HPF
ERYTHROCYTE [DISTWIDTH] IN BLOOD BY AUTOMATED COUNT: 49.6 FL (ref 35.9–50)
ETHANOL BLD-MCNC: <10.1 MG/DL
GFR SERPLBLD CREATININE-BSD FMLA CKD-EPI: 101 ML/MIN/1.73 M 2
GLOBULIN SER CALC-MCNC: 4.8 G/DL (ref 1.9–3.5)
GLUCOSE BLD STRIP.AUTO-MCNC: 85 MG/DL (ref 65–99)
GLUCOSE SERPL-MCNC: 85 MG/DL (ref 65–99)
GLUCOSE UR STRIP.AUTO-MCNC: NEGATIVE MG/DL
HCG SERPL QL: NEGATIVE
HCT VFR BLD AUTO: 43.8 % (ref 37–47)
HGB BLD-MCNC: 14.8 G/DL (ref 12–16)
HYALINE CASTS #/AREA URNS LPF: ABNORMAL /LPF
IMM GRANULOCYTES # BLD AUTO: 0.03 K/UL (ref 0–0.11)
IMM GRANULOCYTES NFR BLD AUTO: 0.4 % (ref 0–0.9)
KETONES UR STRIP.AUTO-MCNC: 15 MG/DL
LACTATE SERPL-SCNC: 1.3 MMOL/L (ref 0.5–2)
LACTATE SERPL-SCNC: 1.5 MMOL/L (ref 0.5–2)
LEUKOCYTE ESTERASE UR QL STRIP.AUTO: ABNORMAL
LYMPHOCYTES # BLD AUTO: 1.37 K/UL (ref 1–4.8)
LYMPHOCYTES NFR BLD: 20 % (ref 22–41)
MCH RBC QN AUTO: 34.3 PG (ref 27–33)
MCHC RBC AUTO-ENTMCNC: 33.8 G/DL (ref 33.6–35)
MCV RBC AUTO: 101.6 FL (ref 81.4–97.8)
METHADONE UR QL SCN: POSITIVE
MICRO URNS: ABNORMAL
MONOCYTES # BLD AUTO: 0.39 K/UL (ref 0–0.85)
MONOCYTES NFR BLD AUTO: 5.7 % (ref 0–13.4)
NEUTROPHILS # BLD AUTO: 4.97 K/UL (ref 2–7.15)
NEUTROPHILS NFR BLD: 72.6 % (ref 44–72)
NITRITE UR QL STRIP.AUTO: NEGATIVE
NRBC # BLD AUTO: 0 K/UL
NRBC BLD-RTO: 0 /100 WBC
NT-PROBNP SERPL IA-MCNC: 3572 PG/ML (ref 0–125)
OPIATES UR QL SCN: NEGATIVE
OXYCODONE UR QL SCN: NEGATIVE
PCP UR QL SCN: NEGATIVE
PH UR STRIP.AUTO: 6 [PH] (ref 5–8)
PLATELET # BLD AUTO: 231 K/UL (ref 164–446)
PMV BLD AUTO: 9.7 FL (ref 9–12.9)
POTASSIUM SERPL-SCNC: 3.9 MMOL/L (ref 3.6–5.5)
PROPOXYPH UR QL SCN: NEGATIVE
PROT SERPL-MCNC: 8.5 G/DL (ref 6–8.2)
PROT UR QL STRIP: 30 MG/DL
RBC # BLD AUTO: 4.31 M/UL (ref 4.2–5.4)
RBC # URNS HPF: ABNORMAL /HPF
RBC UR QL AUTO: ABNORMAL
SODIUM SERPL-SCNC: 137 MMOL/L (ref 135–145)
SP GR UR STRIP.AUTO: 1.02
TROPONIN T SERPL-MCNC: 41 NG/L (ref 6–19)
TSH SERPL DL<=0.005 MIU/L-ACNC: 2.18 UIU/ML (ref 0.38–5.33)
UROBILINOGEN UR STRIP.AUTO-MCNC: 1 MG/DL
WBC # BLD AUTO: 6.9 K/UL (ref 4.8–10.8)
WBC #/AREA URNS HPF: ABNORMAL /HPF

## 2023-02-27 PROCEDURE — 83605 ASSAY OF LACTIC ACID: CPT | Mod: 91

## 2023-02-27 PROCEDURE — 93005 ELECTROCARDIOGRAM TRACING: CPT | Performed by: EMERGENCY MEDICINE

## 2023-02-27 PROCEDURE — 700117 HCHG RX CONTRAST REV CODE 255: Performed by: EMERGENCY MEDICINE

## 2023-02-27 PROCEDURE — 81001 URINALYSIS AUTO W/SCOPE: CPT | Mod: XU

## 2023-02-27 PROCEDURE — 84484 ASSAY OF TROPONIN QUANT: CPT

## 2023-02-27 PROCEDURE — 82140 ASSAY OF AMMONIA: CPT

## 2023-02-27 PROCEDURE — 80053 COMPREHEN METABOLIC PANEL: CPT

## 2023-02-27 PROCEDURE — 85379 FIBRIN DEGRADATION QUANT: CPT

## 2023-02-27 PROCEDURE — 71045 X-RAY EXAM CHEST 1 VIEW: CPT

## 2023-02-27 PROCEDURE — 71275 CT ANGIOGRAPHY CHEST: CPT

## 2023-02-27 PROCEDURE — 82077 ASSAY SPEC XCP UR&BREATH IA: CPT

## 2023-02-27 PROCEDURE — 84703 CHORIONIC GONADOTROPIN ASSAY: CPT

## 2023-02-27 PROCEDURE — 70450 CT HEAD/BRAIN W/O DYE: CPT

## 2023-02-27 PROCEDURE — 99223 1ST HOSP IP/OBS HIGH 75: CPT | Mod: AI,GC | Performed by: STUDENT IN AN ORGANIZED HEALTH CARE EDUCATION/TRAINING PROGRAM

## 2023-02-27 PROCEDURE — 85025 COMPLETE CBC W/AUTO DIFF WBC: CPT

## 2023-02-27 PROCEDURE — 80307 DRUG TEST PRSMV CHEM ANLYZR: CPT

## 2023-02-27 PROCEDURE — 36415 COLL VENOUS BLD VENIPUNCTURE: CPT

## 2023-02-27 PROCEDURE — 84443 ASSAY THYROID STIM HORMONE: CPT

## 2023-02-27 PROCEDURE — 96372 THER/PROPH/DIAG INJ SC/IM: CPT | Mod: XU

## 2023-02-27 PROCEDURE — 700111 HCHG RX REV CODE 636 W/ 250 OVERRIDE (IP): Performed by: EMERGENCY MEDICINE

## 2023-02-27 PROCEDURE — 96375 TX/PRO/DX INJ NEW DRUG ADDON: CPT | Mod: XU

## 2023-02-27 PROCEDURE — 83880 ASSAY OF NATRIURETIC PEPTIDE: CPT

## 2023-02-27 PROCEDURE — 96374 THER/PROPH/DIAG INJ IV PUSH: CPT | Mod: XU

## 2023-02-27 PROCEDURE — 87040 BLOOD CULTURE FOR BACTERIA: CPT

## 2023-02-27 PROCEDURE — 770020 HCHG ROOM/CARE - TELE (206)

## 2023-02-27 PROCEDURE — 700111 HCHG RX REV CODE 636 W/ 250 OVERRIDE (IP): Performed by: STUDENT IN AN ORGANIZED HEALTH CARE EDUCATION/TRAINING PROGRAM

## 2023-02-27 PROCEDURE — 82962 GLUCOSE BLOOD TEST: CPT

## 2023-02-27 PROCEDURE — 99285 EMERGENCY DEPT VISIT HI MDM: CPT

## 2023-02-27 RX ORDER — PHENOL 1.4 %
10 AEROSOL, SPRAY (ML) MUCOUS MEMBRANE
COMMUNITY

## 2023-02-27 RX ORDER — FUROSEMIDE 10 MG/ML
40 INJECTION INTRAMUSCULAR; INTRAVENOUS
Status: DISCONTINUED | OUTPATIENT
Start: 2023-02-27 | End: 2023-02-28

## 2023-02-27 RX ORDER — AMOXICILLIN 250 MG
2 CAPSULE ORAL 2 TIMES DAILY
Status: DISCONTINUED | OUTPATIENT
Start: 2023-02-28 | End: 2023-03-01 | Stop reason: HOSPADM

## 2023-02-27 RX ORDER — POLYETHYLENE GLYCOL 3350 17 G/17G
1 POWDER, FOR SOLUTION ORAL
Status: DISCONTINUED | OUTPATIENT
Start: 2023-02-27 | End: 2023-03-01 | Stop reason: HOSPADM

## 2023-02-27 RX ORDER — CHOLECALCIFEROL (VITAMIN D3) 125 MCG
1000 CAPSULE ORAL DAILY
Status: DISCONTINUED | OUTPATIENT
Start: 2023-02-28 | End: 2023-03-01 | Stop reason: HOSPADM

## 2023-02-27 RX ORDER — NICOTINE 21 MG/24HR
14 PATCH, TRANSDERMAL 24 HOURS TRANSDERMAL
Status: DISCONTINUED | OUTPATIENT
Start: 2023-02-28 | End: 2023-03-01 | Stop reason: HOSPADM

## 2023-02-27 RX ORDER — FUROSEMIDE 10 MG/ML
40 INJECTION INTRAMUSCULAR; INTRAVENOUS
Status: DISCONTINUED | OUTPATIENT
Start: 2023-02-27 | End: 2023-02-27

## 2023-02-27 RX ORDER — CEFTRIAXONE 1 G/1
1000 INJECTION, POWDER, FOR SOLUTION INTRAMUSCULAR; INTRAVENOUS ONCE
Status: COMPLETED | OUTPATIENT
Start: 2023-02-27 | End: 2023-02-27

## 2023-02-27 RX ORDER — DIPHENHYDRAMINE HYDROCHLORIDE 50 MG/ML
25 INJECTION INTRAMUSCULAR; INTRAVENOUS ONCE
Status: COMPLETED | OUTPATIENT
Start: 2023-02-27 | End: 2023-02-27

## 2023-02-27 RX ORDER — ACETAMINOPHEN 325 MG/1
650 TABLET ORAL EVERY 6 HOURS PRN
Status: DISCONTINUED | OUTPATIENT
Start: 2023-02-27 | End: 2023-03-01 | Stop reason: HOSPADM

## 2023-02-27 RX ORDER — ENOXAPARIN SODIUM 100 MG/ML
40 INJECTION SUBCUTANEOUS DAILY
Status: DISCONTINUED | OUTPATIENT
Start: 2023-02-27 | End: 2023-03-01 | Stop reason: HOSPADM

## 2023-02-27 RX ORDER — METHYLPREDNISOLONE SODIUM SUCCINATE 125 MG/2ML
62.5 INJECTION, POWDER, LYOPHILIZED, FOR SOLUTION INTRAMUSCULAR; INTRAVENOUS ONCE
Status: COMPLETED | OUTPATIENT
Start: 2023-02-27 | End: 2023-02-27

## 2023-02-27 RX ORDER — BISACODYL 10 MG
10 SUPPOSITORY, RECTAL RECTAL
Status: DISCONTINUED | OUTPATIENT
Start: 2023-02-27 | End: 2023-03-01 | Stop reason: HOSPADM

## 2023-02-27 RX ADMIN — DIPHENHYDRAMINE HYDROCHLORIDE 25 MG: 50 INJECTION, SOLUTION INTRAMUSCULAR; INTRAVENOUS at 22:15

## 2023-02-27 RX ADMIN — FUROSEMIDE 40 MG: 10 INJECTION, SOLUTION INTRAMUSCULAR; INTRAVENOUS at 23:20

## 2023-02-27 RX ADMIN — CEFTRIAXONE SODIUM 1000 MG: 1 INJECTION, POWDER, FOR SOLUTION INTRAMUSCULAR; INTRAVENOUS at 23:16

## 2023-02-27 RX ADMIN — ENOXAPARIN SODIUM 40 MG: 100 INJECTION SUBCUTANEOUS at 23:19

## 2023-02-27 RX ADMIN — IOHEXOL 60 ML: 350 INJECTION, SOLUTION INTRAVENOUS at 23:30

## 2023-02-27 RX ADMIN — METHYLPREDNISOLONE SODIUM SUCCINATE 62.5 MG: 125 INJECTION, POWDER, FOR SOLUTION INTRAMUSCULAR; INTRAVENOUS at 22:15

## 2023-02-27 ASSESSMENT — FIBROSIS 4 INDEX: FIB4 SCORE: 1.01

## 2023-02-27 NOTE — ED TRIAGE NOTES
"Chief Complaint   Patient presents with    Leg Swelling     X1 month    ALOC     X3 days     Incontinence     X3 days    Rash     Pt and daughter to triage with complaints of increased confusion, new onset incontinence, and leg swelling. Pts daughter states pt usually takes multiple shots of liquor daily, but has not recently due to new confusion. BGL 85.     Pt is alert and oriented, speaking in full sentences, follows commands and responds appropriately to questions.      Pt placed in lobby. Pt educated on triage process and apologized for wait time. Pt encouraged to alert staff for any changes.     Patient and staff wearing appropriate PPE      BP (!) 161/112   Pulse (!) 103   Temp 36.8 °C (98.2 °F) (Temporal)   Resp 18   Ht 1.549 m (5' 1\")   Wt 52.2 kg (115 lb)   SpO2 93%       "

## 2023-02-28 PROBLEM — E83.42 HYPOMAGNESEMIA: Status: ACTIVE | Noted: 2023-02-28

## 2023-02-28 PROBLEM — I50.33 ACUTE ON CHRONIC HEART FAILURE WITH PRESERVED EJECTION FRACTION (HFPEF) (HCC): Status: ACTIVE | Noted: 2023-02-27

## 2023-02-28 PROBLEM — Z72.0 TOBACCO USE: Status: ACTIVE | Noted: 2023-02-28

## 2023-02-28 PROBLEM — F10.20 ALCOHOL DEPENDENCE (HCC): Status: ACTIVE | Noted: 2023-02-27

## 2023-02-28 LAB
ALBUMIN SERPL BCP-MCNC: 3.6 G/DL (ref 3.2–4.9)
ALBUMIN/GLOB SERPL: 0.8 G/DL
ALP SERPL-CCNC: 107 U/L (ref 30–99)
ALT SERPL-CCNC: 33 U/L (ref 2–50)
ANION GAP SERPL CALC-SCNC: 13 MMOL/L (ref 7–16)
AST SERPL-CCNC: 64 U/L (ref 12–45)
BASOPHILS # BLD AUTO: 0.2 % (ref 0–1.8)
BASOPHILS # BLD: 0.01 K/UL (ref 0–0.12)
BILIRUB SERPL-MCNC: 0.8 MG/DL (ref 0.1–1.5)
BUN SERPL-MCNC: 23 MG/DL (ref 8–22)
CALCIUM ALBUM COR SERPL-MCNC: 10.8 MG/DL (ref 8.5–10.5)
CALCIUM SERPL-MCNC: 10.5 MG/DL (ref 8.5–10.5)
CHLORIDE SERPL-SCNC: 100 MMOL/L (ref 96–112)
CHOLEST SERPL-MCNC: 186 MG/DL (ref 100–199)
CO2 SERPL-SCNC: 21 MMOL/L (ref 20–33)
CREAT SERPL-MCNC: 0.63 MG/DL (ref 0.5–1.4)
EOSINOPHIL # BLD AUTO: 0.03 K/UL (ref 0–0.51)
EOSINOPHIL NFR BLD: 0.5 % (ref 0–6.9)
ERYTHROCYTE [DISTWIDTH] IN BLOOD BY AUTOMATED COUNT: 48 FL (ref 35.9–50)
EST. AVERAGE GLUCOSE BLD GHB EST-MCNC: 103 MG/DL
GFR SERPLBLD CREATININE-BSD FMLA CKD-EPI: 99 ML/MIN/1.73 M 2
GLOBULIN SER CALC-MCNC: 4.6 G/DL (ref 1.9–3.5)
GLUCOSE SERPL-MCNC: 92 MG/DL (ref 65–99)
HBA1C MFR BLD: 5.2 % (ref 4–5.6)
HCT VFR BLD AUTO: 44.7 % (ref 37–47)
HDLC SERPL-MCNC: 49 MG/DL
HGB BLD-MCNC: 15.7 G/DL (ref 12–16)
IMM GRANULOCYTES # BLD AUTO: 0.05 K/UL (ref 0–0.11)
IMM GRANULOCYTES NFR BLD AUTO: 0.9 % (ref 0–0.9)
LDLC SERPL CALC-MCNC: 121 MG/DL
LYMPHOCYTES # BLD AUTO: 0.53 K/UL (ref 1–4.8)
LYMPHOCYTES NFR BLD: 9.1 % (ref 22–41)
MAGNESIUM SERPL-MCNC: 1.6 MG/DL (ref 1.5–2.5)
MCH RBC QN AUTO: 34.7 PG (ref 27–33)
MCHC RBC AUTO-ENTMCNC: 35.1 G/DL (ref 33.6–35)
MCV RBC AUTO: 98.7 FL (ref 81.4–97.8)
MONOCYTES # BLD AUTO: 0.11 K/UL (ref 0–0.85)
MONOCYTES NFR BLD AUTO: 1.9 % (ref 0–13.4)
NEUTROPHILS # BLD AUTO: 5.1 K/UL (ref 2–7.15)
NEUTROPHILS NFR BLD: 87.4 % (ref 44–72)
NRBC # BLD AUTO: 0 K/UL
NRBC BLD-RTO: 0 /100 WBC
PLATELET # BLD AUTO: 236 K/UL (ref 164–446)
PMV BLD AUTO: 9.3 FL (ref 9–12.9)
POTASSIUM SERPL-SCNC: 3.4 MMOL/L (ref 3.6–5.5)
PROT SERPL-MCNC: 8.2 G/DL (ref 6–8.2)
RBC # BLD AUTO: 4.53 M/UL (ref 4.2–5.4)
SODIUM SERPL-SCNC: 134 MMOL/L (ref 135–145)
TRIGL SERPL-MCNC: 80 MG/DL (ref 0–149)
TROPONIN T SERPL-MCNC: 36 NG/L (ref 6–19)
TSH SERPL DL<=0.005 MIU/L-ACNC: 1.62 UIU/ML (ref 0.38–5.33)
VIT B12 SERPL-MCNC: 1008 PG/ML (ref 211–911)
WBC # BLD AUTO: 5.8 K/UL (ref 4.8–10.8)

## 2023-02-28 PROCEDURE — A9270 NON-COVERED ITEM OR SERVICE: HCPCS | Performed by: STUDENT IN AN ORGANIZED HEALTH CARE EDUCATION/TRAINING PROGRAM

## 2023-02-28 PROCEDURE — 83036 HEMOGLOBIN GLYCOSYLATED A1C: CPT

## 2023-02-28 PROCEDURE — 307059 PAD,EAR PROTECTOR: Performed by: STUDENT IN AN ORGANIZED HEALTH CARE EDUCATION/TRAINING PROGRAM

## 2023-02-28 PROCEDURE — 700111 HCHG RX REV CODE 636 W/ 250 OVERRIDE (IP): Performed by: STUDENT IN AN ORGANIZED HEALTH CARE EDUCATION/TRAINING PROGRAM

## 2023-02-28 PROCEDURE — 83735 ASSAY OF MAGNESIUM: CPT

## 2023-02-28 PROCEDURE — 770020 HCHG ROOM/CARE - TELE (206)

## 2023-02-28 PROCEDURE — 80053 COMPREHEN METABOLIC PANEL: CPT

## 2023-02-28 PROCEDURE — 84443 ASSAY THYROID STIM HORMONE: CPT

## 2023-02-28 PROCEDURE — 84484 ASSAY OF TROPONIN QUANT: CPT

## 2023-02-28 PROCEDURE — 99232 SBSQ HOSP IP/OBS MODERATE 35: CPT | Mod: GC | Performed by: STUDENT IN AN ORGANIZED HEALTH CARE EDUCATION/TRAINING PROGRAM

## 2023-02-28 PROCEDURE — 93005 ELECTROCARDIOGRAM TRACING: CPT | Performed by: STUDENT IN AN ORGANIZED HEALTH CARE EDUCATION/TRAINING PROGRAM

## 2023-02-28 PROCEDURE — 82607 VITAMIN B-12: CPT

## 2023-02-28 PROCEDURE — 700102 HCHG RX REV CODE 250 W/ 637 OVERRIDE(OP): Performed by: STUDENT IN AN ORGANIZED HEALTH CARE EDUCATION/TRAINING PROGRAM

## 2023-02-28 PROCEDURE — 85025 COMPLETE CBC W/AUTO DIFF WBC: CPT

## 2023-02-28 PROCEDURE — 700105 HCHG RX REV CODE 258: Performed by: STUDENT IN AN ORGANIZED HEALTH CARE EDUCATION/TRAINING PROGRAM

## 2023-02-28 PROCEDURE — 94760 N-INVAS EAR/PLS OXIMETRY 1: CPT

## 2023-02-28 PROCEDURE — 80061 LIPID PANEL: CPT

## 2023-02-28 RX ORDER — MAGNESIUM SULFATE HEPTAHYDRATE 40 MG/ML
4 INJECTION, SOLUTION INTRAVENOUS ONCE
Status: COMPLETED | OUTPATIENT
Start: 2023-02-28 | End: 2023-03-01

## 2023-02-28 RX ORDER — METOPROLOL SUCCINATE 25 MG/1
25 TABLET, EXTENDED RELEASE ORAL
Status: DISCONTINUED | OUTPATIENT
Start: 2023-02-28 | End: 2023-03-01 | Stop reason: HOSPADM

## 2023-02-28 RX ORDER — POTASSIUM CHLORIDE 20 MEQ/1
40 TABLET, EXTENDED RELEASE ORAL EVERY 4 HOURS
Status: COMPLETED | OUTPATIENT
Start: 2023-02-28 | End: 2023-02-28

## 2023-02-28 RX ORDER — LOSARTAN POTASSIUM 25 MG/1
25 TABLET ORAL
Status: DISCONTINUED | OUTPATIENT
Start: 2023-02-28 | End: 2023-03-01 | Stop reason: HOSPADM

## 2023-02-28 RX ORDER — FUROSEMIDE 10 MG/ML
40 INJECTION INTRAMUSCULAR; INTRAVENOUS
Status: DISCONTINUED | OUTPATIENT
Start: 2023-02-28 | End: 2023-03-01 | Stop reason: HOSPADM

## 2023-02-28 RX ADMIN — CYANOCOBALAMIN TAB 500 MCG 1000 MCG: 500 TAB at 06:07

## 2023-02-28 RX ADMIN — ENOXAPARIN SODIUM 40 MG: 100 INJECTION SUBCUTANEOUS at 17:32

## 2023-02-28 RX ADMIN — SENNOSIDES AND DOCUSATE SODIUM 2 TABLET: 50; 8.6 TABLET ORAL at 17:31

## 2023-02-28 RX ADMIN — MAGNESIUM SULFATE HEPTAHYDRATE 4 G: 40 INJECTION, SOLUTION INTRAVENOUS at 20:54

## 2023-02-28 RX ADMIN — THERA TABS 1 TABLET: TAB at 06:07

## 2023-02-28 RX ADMIN — POTASSIUM CHLORIDE 40 MEQ: 1500 TABLET, EXTENDED RELEASE ORAL at 09:25

## 2023-02-28 RX ADMIN — FUROSEMIDE 40 MG: 10 INJECTION, SOLUTION INTRAMUSCULAR; INTRAVENOUS at 17:31

## 2023-02-28 RX ADMIN — METOPROLOL SUCCINATE 25 MG: 25 TABLET, EXTENDED RELEASE ORAL at 17:33

## 2023-02-28 RX ADMIN — POTASSIUM CHLORIDE 40 MEQ: 1500 TABLET, EXTENDED RELEASE ORAL at 17:32

## 2023-02-28 RX ADMIN — LOSARTAN POTASSIUM 25 MG: 25 TABLET, FILM COATED ORAL at 17:32

## 2023-02-28 RX ADMIN — THIAMINE HYDROCHLORIDE 100 MG: 100 INJECTION, SOLUTION INTRAMUSCULAR; INTRAVENOUS at 06:09

## 2023-02-28 ASSESSMENT — ENCOUNTER SYMPTOMS
WEAKNESS: 0
ABDOMINAL PAIN: 0
HEARTBURN: 0
WHEEZING: 0
CHILLS: 0
DOUBLE VISION: 0
ORTHOPNEA: 0
PHOTOPHOBIA: 0
VOMITING: 0
SENSORY CHANGE: 0
PALPITATIONS: 0
NECK PAIN: 0
NAUSEA: 0
BLOOD IN STOOL: 0
SPUTUM PRODUCTION: 0
COUGH: 0
DEPRESSION: 0
DIZZINESS: 0
FEVER: 0
SHORTNESS OF BREATH: 0
SORE THROAT: 0
BACK PAIN: 0
BLURRED VISION: 0
HEADACHES: 0
SPEECH CHANGE: 0
MYALGIAS: 0

## 2023-02-28 ASSESSMENT — COGNITIVE AND FUNCTIONAL STATUS - GENERAL
SUGGESTED CMS G CODE MODIFIER MOBILITY: CL
MOVING FROM LYING ON BACK TO SITTING ON SIDE OF FLAT BED: A LOT
TURNING FROM BACK TO SIDE WHILE IN FLAT BAD: A LOT
CLIMB 3 TO 5 STEPS WITH RAILING: A LOT
MOVING TO AND FROM BED TO CHAIR: A LOT
TOILETING: A LITTLE
DAILY ACTIVITIY SCORE: 21
SUGGESTED CMS G CODE MODIFIER DAILY ACTIVITY: CJ
WALKING IN HOSPITAL ROOM: A LOT
HELP NEEDED FOR BATHING: A LITTLE
STANDING UP FROM CHAIR USING ARMS: A LOT
DRESSING REGULAR UPPER BODY CLOTHING: A LITTLE
MOBILITY SCORE: 12

## 2023-02-28 ASSESSMENT — LIFESTYLE VARIABLES
DOES PATIENT WANT TO TALK TO SOMEONE ABOUT QUITTING: NO
HAVE PEOPLE ANNOYED YOU BY CRITICIZING YOUR DRINKING: YES
ALCOHOL_USE: YES
TOTAL SCORE: 4
ON A TYPICAL DAY WHEN YOU DRINK ALCOHOL HOW MANY DRINKS DO YOU HAVE: 7
SUBSTANCE_ABUSE: 1
DOES PATIENT WANT TO STOP DRINKING: YES
TOTAL SCORE: 4
EVER FELT BAD OR GUILTY ABOUT YOUR DRINKING: YES
TOTAL SCORE: 4
CONSUMPTION TOTAL: POSITIVE
HAVE YOU EVER FELT YOU SHOULD CUT DOWN ON YOUR DRINKING: YES
AVERAGE NUMBER OF DAYS PER WEEK YOU HAVE A DRINK CONTAINING ALCOHOL: 7
HOW MANY TIMES IN THE PAST YEAR HAVE YOU HAD 5 OR MORE DRINKS IN A DAY: 365
EVER HAD A DRINK FIRST THING IN THE MORNING TO STEADY YOUR NERVES TO GET RID OF A HANGOVER: YES

## 2023-02-28 ASSESSMENT — FIBROSIS 4 INDEX
FIB4 SCORE: 3.5
FIB4 SCORE: 2.97
FIB4 SCORE: 2.97

## 2023-02-28 ASSESSMENT — PATIENT HEALTH QUESTIONNAIRE - PHQ9
1. LITTLE INTEREST OR PLEASURE IN DOING THINGS: NOT AT ALL
5. POOR APPETITE OR OVEREATING: MORE THAN HALF THE DAYS
2. FEELING DOWN, DEPRESSED, IRRITABLE, OR HOPELESS: SEVERAL DAYS
4. FEELING TIRED OR HAVING LITTLE ENERGY: SEVERAL DAYS
9. THOUGHTS THAT YOU WOULD BE BETTER OFF DEAD, OR OF HURTING YOURSELF: NOT AT ALL
SUM OF ALL RESPONSES TO PHQ9 QUESTIONS 1 AND 2: 1
8. MOVING OR SPEAKING SO SLOWLY THAT OTHER PEOPLE COULD HAVE NOTICED. OR THE OPPOSITE, BEING SO FIGETY OR RESTLESS THAT YOU HAVE BEEN MOVING AROUND A LOT MORE THAN USUAL: SEVERAL DAYS
1. LITTLE INTEREST OR PLEASURE IN DOING THINGS: NOT AT ALL
2. FEELING DOWN, DEPRESSED, IRRITABLE, OR HOPELESS: SEVERAL DAYS
SUM OF ALL RESPONSES TO PHQ9 QUESTIONS 1 AND 2: 1
6. FEELING BAD ABOUT YOURSELF - OR THAT YOU ARE A FAILURE OR HAVE LET YOURSELF OR YOUR FAMILY DOWN: SEVERAL DAYS
SUM OF ALL RESPONSES TO PHQ QUESTIONS 1-9: 8
3. TROUBLE FALLING OR STAYING ASLEEP OR SLEEPING TOO MUCH: SEVERAL DAYS
7. TROUBLE CONCENTRATING ON THINGS, SUCH AS READING THE NEWSPAPER OR WATCHING TELEVISION: SEVERAL DAYS

## 2023-02-28 ASSESSMENT — PAIN DESCRIPTION - PAIN TYPE
TYPE: SURGICAL PAIN
TYPE: ACUTE PAIN;CHRONIC PAIN

## 2023-02-28 NOTE — ASSESSMENT & PLAN NOTE
15 on presentation  -unclear etilogy, possibly secondary to stool losses  -Resolved  PLAN  -continue to monitor

## 2023-02-28 NOTE — ASSESSMENT & PLAN NOTE
-reports 3+ drinks of vodka daily  -last drink estimated 2/26/23  PLAN  -IV thiamine x3 days, folate, multivitamin  -no CIWA at this time but low threshold to start if she starts to withdraw

## 2023-02-28 NOTE — ED NOTES
Straight cath patient and there was no urine return. Bladder scanned patient and the results showed 52mL. ERP notified.

## 2023-02-28 NOTE — CARE PLAN
Problem: Knowledge Deficit - Standard  Goal: Patient and family/care givers will demonstrate understanding of plan of care, disease process/condition, diagnostic tests and medications  Outcome: Progressing     Problem: Fall Risk  Goal: Patient will remain free from falls  Outcome: Progressing     Problem: Skin Integrity  Goal: Skin integrity is maintained or improved  Outcome: Progressing   The patient is Stable - Low risk of patient condition declining or worsening    Shift Goals  Clinical Goals: wean O2, monitor I&Os  Patient Goals: rest, comfort  Family Goals: rest, comfort    Progress made toward(s) clinical / shift goals:  Patient resting comfortably overnight. VSS.    Patient is not progressing towards the following goals:

## 2023-02-28 NOTE — ED NOTES
Report received from LYNDSEY Shaikh. Assumed care of patient and she is resting with family at bedside. Bed locked and in lowest position. Call light within reach. No distress noted.

## 2023-02-28 NOTE — PROGRESS NOTES
4 Eyes Skin Assessment Completed by LYNDSEY Stephens and LYNDSEY Mendez.    Head WDL  Ears WDL  Nose WDL  Mouth WDL  Neck WDL  Breast/Chest WDL  Shoulder Blades WDL  Spine Redness and Blanching  (R) Arm/Elbow/Hand WDL  (L) Arm/Elbow/Hand WDL  Abdomen WDL  Groin WDL  Scrotum/Coccyx/Buttocks Redness and Blanching  (R) Leg Redness and Blanching  (L) Leg Redness and Blanching  (R) Heel/Foot/Toe Redness and Blanching  (L) Heel/Foot/Toe Redness and Blanching  Lower extremity redness-cardiac related         Devices In Places Tele Box, Blood Pressure Cuff, and Pulse Ox      Interventions In Place Gray Ear Foams and Pillows    Possible Skin Injury No    Pictures Uploaded Into Epic N/A  Wound Consult Placed N/A  RN Wound Prevention Protocol Ordered Yes

## 2023-02-28 NOTE — ED PROVIDER NOTES
ED Provider Note    CHIEF COMPLAINT  Chief Complaint   Patient presents with    Leg Swelling     X1 month    ALOC     X3 days     Incontinence     X3 days    Rash       EXTERNAL RECORDS REVIEWED  Reviewed records from other hospital system admissions and baseline labs.    HPI/ROS  LIMITATION TO HISTORY   Select: Altered mental status / Confusion  OUTSIDE HISTORIAN(S):  Family daughter present at bedside provides additional history about her altered mentation.    Sanam Andre is a 62 y.o. female who presents to the emergency department with multiple complaints.  First complaint is progressive bilateral lower extremity edema.  This been present for the last month and gradually worsening.  The patient denies any history of PE or DVT.  Denies any travel or immobilization.  Denies any fevers or chills.  Has been associated with periods of confusion.  The patient is a history of heavy daily alcohol use currently she has not been drinking.  She is been laying in bed and has had incontinence of urine.  She had some associated rash seems to wax and wane these red spots that seem to come and go they are mildly itchy.  She also has a history of confusion where she is just not acting herself.  No falls or trauma.  No headache.  No cough.  No dysuria.    PAST MEDICAL HISTORY   has a past medical history of Arrhythmia, Hepatitis C, Hypertension, Pain, and Rheumatoid arthritis(714.0).    SURGICAL HISTORY   has a past surgical history that includes gyn surgery (2000); other (1976); knee arthroplasty total (9/15/2010); and orif, fracture, femur (Right, 11/3/2021).    FAMILY HISTORY  No family history on file.    SOCIAL HISTORY  Social History     Tobacco Use    Smoking status: Former    Smokeless tobacco: Never    Tobacco comments:     3/4 ppd for 34 years   Substance and Sexual Activity    Alcohol use: No     Comment: 1 per week    Drug use: No    Sexual activity: Not on file       CURRENT MEDICATIONS  Home Medications        "Reviewed by Arminda Cano R.N. (Registered Nurse) on 02/27/23 at 1523  Med List Status: Not Addressed     Medication Last Dose Status   acetaminophen (TYLENOL) 325 MG Tab  Active   aspirin 81 MG EC tablet  Active   atorvastatin (LIPITOR) 40 MG Tab  Active   gabapentin (NEURONTIN) 300 MG Cap  Active   lisinopril (PRINIVIL) 5 MG Tab  Active   polyethylene glycol/lytes (MIRALAX) 17 g Pack  Active                    ALLERGIES  Allergies   Allergen Reactions    Toradol [Ketorolac Tromethamine]      Patient reports \"severe headache for 2 hours\" post administration    Codeine Vomiting    Iodine Anaphylaxis     Reaction took place during CT and IV dye injection       PHYSICAL EXAM  VITAL SIGNS: BP (!) 179/95   Pulse 100   Temp 36.8 °C (98.2 °F) (Temporal)   Resp 14   Ht 1.549 m (5' 1\")   Wt 52.2 kg (115 lb)   SpO2 93%   BMI 21.73 kg/m²    Constitutional: Awake chronically ill-appearing no acute cardiopulmonary distress.  HENT: Normocephalic, Atraumatic, Bilateral external ears normal,  Eyes: PERRL, EOMI, Conjunctiva normal, No discharge.   Neck: Normal range of motion, No tenderness, Supple, No stridor.   Cardiovascular: Normal heart rate, Normal rhythm, No murmurs, No rubs, No gallops.   Thorax & Lungs: Distant breath sounds bilaterally no crackles.  Abdomen: Bowel sounds normal, Soft, No tenderness  Skin: Warm, Dry, No erythema, No rash.   Back: No tenderness, No CVA tenderness.   Musculoskeletal: Good range of motion in all major joints.  Moderate bilateral lower extremity edema.  Neurologic: Alert,, No focal deficits noted.         DIAGNOSTIC STUDIES / PROCEDURES    Results for orders placed or performed during the hospital encounter of 02/27/23   CBC With Differential   Result Value Ref Range    WBC 6.9 4.8 - 10.8 K/uL    RBC 4.31 4.20 - 5.40 M/uL    Hemoglobin 14.8 12.0 - 16.0 g/dL    Hematocrit 43.8 37.0 - 47.0 %    .6 (H) 81.4 - 97.8 fL    MCH 34.3 (H) 27.0 - 33.0 pg    MCHC 33.8 33.6 - 35.0 g/dL    " RDW 49.6 35.9 - 50.0 fL    Platelet Count 231 164 - 446 K/uL    MPV 9.7 9.0 - 12.9 fL    Neutrophils-Polys 72.60 (H) 44.00 - 72.00 %    Lymphocytes 20.00 (L) 22.00 - 41.00 %    Monocytes 5.70 0.00 - 13.40 %    Eosinophils 1.20 0.00 - 6.90 %    Basophils 0.10 0.00 - 1.80 %    Immature Granulocytes 0.40 0.00 - 0.90 %    Nucleated RBC 0.00 /100 WBC    Neutrophils (Absolute) 4.97 2.00 - 7.15 K/uL    Lymphs (Absolute) 1.37 1.00 - 4.80 K/uL    Monos (Absolute) 0.39 0.00 - 0.85 K/uL    Eos (Absolute) 0.08 0.00 - 0.51 K/uL    Baso (Absolute) 0.01 0.00 - 0.12 K/uL    Immature Granulocytes (abs) 0.03 0.00 - 0.11 K/uL    NRBC (Absolute) 0.00 K/uL   Comp Metabolic Panel   Result Value Ref Range    Sodium 137 135 - 145 mmol/L    Potassium 3.9 3.6 - 5.5 mmol/L    Chloride 106 96 - 112 mmol/L    Co2 15 (L) 20 - 33 mmol/L    Anion Gap 16.0 7.0 - 16.0    Glucose 85 65 - 99 mg/dL    Bun 26 (H) 8 - 22 mg/dL    Creatinine 0.58 0.50 - 1.40 mg/dL    Calcium 10.8 (H) 8.5 - 10.5 mg/dL    AST(SGOT) 77 (H) 12 - 45 U/L    ALT(SGPT) 36 2 - 50 U/L    Alkaline Phosphatase 108 (H) 30 - 99 U/L    Total Bilirubin 0.9 0.1 - 1.5 mg/dL    Albumin 3.7 3.2 - 4.9 g/dL    Total Protein 8.5 (H) 6.0 - 8.2 g/dL    Globulin 4.8 (H) 1.9 - 3.5 g/dL    A-G Ratio 0.8 g/dL   HCG Qual Serum   Result Value Ref Range    Beta-Hcg Qualitative Serum Negative Negative   Diagnostic Alcohol   Result Value Ref Range    Diagnostic Alcohol <10.1 <10.1 mg/dL   Urine Drug Screen (Triage)   Result Value Ref Range    Amphetamines Urine Negative Negative    Barbiturates Negative Negative    Benzodiazepines Negative Negative    Cocaine Metabolite Negative Negative    Methadone Positive (A) Negative    Opiates Negative Negative    Oxycodone Negative Negative    Phencyclidine -Pcp Negative Negative    Propoxyphene Negative Negative    Cannabinoid Metab Positive (A) Negative   CORRECTED CALCIUM   Result Value Ref Range    Correct Calcium 11.0 (H) 8.5 - 10.5 mg/dL   ESTIMATED GFR    Result Value Ref Range    GFR (CKD-EPI) 101 >60 mL/min/1.73 m 2   URINALYSIS CULTURE, IF INDICATED    Specimen: Urine, Clean Catch   Result Value Ref Range    Color DK Yellow     Character Clear     Specific Gravity 1.024 <1.035    Ph 6.0 5.0 - 8.0    Glucose Negative Negative mg/dL    Ketones 15 (A) Negative mg/dL    Protein 30 (A) Negative mg/dL    Bilirubin Negative Negative    Urobilinogen, Urine 1.0 Negative    Nitrite Negative Negative    Leukocyte Esterase Trace (A) Negative    Occult Blood Trace (A) Negative    Micro Urine Req Microscopic    TSH WITH REFLEX TO FT4   Result Value Ref Range    TSH 2.180 0.380 - 5.330 uIU/mL   Lactic Acid   Result Value Ref Range    Lactic Acid 1.5 0.5 - 2.0 mmol/L   Lactic Acid   Result Value Ref Range    Lactic Acid 1.3 0.5 - 2.0 mmol/L   AMMONIA   Result Value Ref Range    Ammonia 12 11 - 45 umol/L   proBrain Natriuretic Peptide, NT   Result Value Ref Range    NT-proBNP 3572 (H) 0 - 125 pg/mL   TROPONIN   Result Value Ref Range    Troponin T 41 (H) 6 - 19 ng/L   D-DIMER   Result Value Ref Range    D-Dimer 2.04 (H) 0.00 - 0.50 ug/mL (FEU)   URINE MICROSCOPIC (W/UA)   Result Value Ref Range    WBC 2-5 /hpf    RBC 2-5 (A) /hpf    Bacteria Negative None /hpf    Epithelial Cells Few /hpf    Hyaline Cast 0-2 /lpf   EKG (NOW)   Result Value Ref Range    Report       [facility] Emergency Dept.    Test Date:  2023  Pt Name:    EDGAR CLAROS                 Department:   MRN:        [id number]                      Room:       [facility]  Gender:     Female                       Technician: [id number]  :        1960                   Requested By:PATRICIA ESPINO  Order #:    [id number]                    Reading MD: PATRICIA ESPINO. AMD    Measurements  Intervals                                Axis  Rate:       97                           P:          73  VT:         183                          QRS:        -64  QRSD:       161                          T:           91  QT:         395  QTc:        502    Interpretive Statements  Sinus rhythm  Probable left atrial enlargement  Left bundle branch block  Compared to ECG 11/01/2021 14:59:30  Left bundle-branch block now present  Electronically Signed On 2- 18:21:01 PST by PATRICIA ESPINO. AMD     POCT glucose device results   Result Value Ref Range    POC Glucose, Blood 85 65 - 99 mg/dL        RADIOLOGY  I have independently interpreted the diagnostic imaging associated with this visit and am waiting the final reading from the radiologist.   My preliminary interpretation is a follows: No obvious pneumonia or pneumothorax    Radiologist interpretation:   CT-CTA CHEST PULMONARY ARTERY W/ RECONS   Final Result         1.  No pulmonary embolus appreciated.   2.  Cardiomegaly      DX-CHEST-PORTABLE (1 VIEW)   Final Result      Enlarged cardiac silhouette.      No acute abnormality.      CT-HEAD W/O   Final Result      1.  Chronic microvascular ischemic type changes.   2.  No acute intracranial abnormality.         EC-ECHOCARDIOGRAM COMPLETE W/O CONT    (Results Pending)         COURSE & MEDICAL DECISION MAKING    ED Observation Status? Yes; I am placing the patient in to an observation status due to a diagnostic uncertainty as well as therapeutic intensity. Patient placed in observation status at 6:14 PM, 2/27/2023.     Observation plan is as follows: The patient will be a admitted to ED observation undergo work-up for altered mentation, and edema.    Upon Reevaluation, the patient's condition has: not improved; and will be escalated to hospitalization.    Patient discharged from ED Observation status at 2235  (Time) 2/27 (Date).     INITIAL ASSESSMENT, COURSE AND PLAN  Care Narrative:     Patient presents to the emergency department for evaluation of episodes of confusion, urinary incontinence, feeling weak and bilateral lower extremity edema and occasional rash.    A broad differential diagnosis was considered including but  not limited to polypharmacy, alcohol or substance abuse, UTI, electrolyte abnormality, sepsis, intracranial abnormality, pneumonia, UTI, renal failure, metastatic disease, PE or cardiac disease or chronic lung disease.    The patient was worked over the above initial diagnosis with labs and imaging.  EKG and troponin are abnormal her BNP is elevated but is not specific nor is it consistent with ACS.  She does not have chest pain or shortness of breath.  Electrolytes and labs are fairly reassuring.    Chest x-ray does show some abnormalities.  She has bilateral lower extremity edema she is known to be hypoxemic on room air she is elevated troponin and BNP.  This could be cardiac or could be a PE.  D-dimer is elevated.  CT of the chest obtained this does not show PE.  She does have cardiomegaly.  There is certainly a component of heart failure going on here.    Patient has urinary incontinence.  She had a bladder scan that did not show retention.  She does not have significant back pain.  She denies saddle anesthesia or back pain or leg weakness to this in the direction of cauda equina although neurologic etiology does have to remain the differential diagnosis.    Urinalysis looks suspicious but considering her urgency and frequency I think it is worth treating empirically with antibiotics.    Her head CT is unremarkable.  At this point I do not see an indication because of her altered mental status.  Suspect she has new decompensated heart failure.  For this reason she will be admitted.  Discussed case with hospitalist team and care is transferred at that time.        ADDITIONAL PROBLEM LIST  Chronic pain  Rheumatoid arthritis  DISPOSITION AND DISCUSSIONS  I have discussed management of the patient with the following physicians and GILL's:  Renjaguar hospitalist, Dr Linn    Discussion of management with other [facility] or appropriate source(s):      Escalation of care considered, and ultimately not performed: Bilateral lower  extremity ultrasounds were considered but this can be done as an inpatient.      FINAL DIAGNOSIS  1. Weakness    2. Acute congestive heart failure, unspecified heart failure type (HCC)    3. Acute UTI    4. Altered mental status, unspecified altered mental status type           Electronically signed by: Rodriguez Ricci M.D., 2/27/2023 6:11 PM

## 2023-02-28 NOTE — DISCHARGE PLANNING
note:  Discussed with IDT, per MD possible discharge tomorrow if echo is negative.     CM talked to [place] at [facility] who informed CM that pt is actually an inactive customer since 2021 so pt will need new orders if home O2 is needed.

## 2023-02-28 NOTE — ED NOTES
Patient to and from CT in stable condition with this RN and zoll monitor due to anaphylaxis reaction to iodine. Patient tolerated well. Denies SOB, CP, or difficulty breathing.

## 2023-02-28 NOTE — ASSESSMENT & PLAN NOTE
"Last ECHO 11/21: 45%  -reportedly was on lisinopril on the past however stopped at least a year ago \"I ran out and I didn't bother to call for a refill\"  -denies any recent chest pain  -was not on lasix prior  PLAN  - initiate IV Lasix diuresis to titrate to net negative fluid balance  -Strict I/Os, daily weights  -Cardiac diet, 2000ml fluid restrict, 2g sodium restrict  -ECHO ordered  -5,2, , TSH wnl  -Initiating goal-directed medical therapy with beta-blocker and ACE inhibitor  -D-dimer elevated at 2.04 however CT PE was negative for PE  "

## 2023-02-28 NOTE — ED NOTES
Assist RN: ANAI established, blood work collected and sent to lab. 1st set of blood cultures collected and sent.

## 2023-02-28 NOTE — DISCHARGE PLANNING
Received Choice form at 1031  Agency/Facility Name: [facility]  Referral not sent, need DME order.
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
## 2023-02-28 NOTE — CARE PLAN
Problem: Knowledge Deficit - Standard  Goal: Patient and family/care givers will demonstrate understanding of plan of care, disease process/condition, diagnostic tests and medications  Outcome: Progressing     Problem: Fall Risk  Goal: Patient will remain free from falls  Outcome: Progressing     Problem: Skin Integrity  Goal: Skin integrity is maintained or improved  Outcome: Progressing   The patient is Watcher - Medium risk of patient condition declining or worsening    Shift Goals  Clinical Goals: wean O2, monitor I&Os  Patient Goals: rest, comfort  Family Goals: rest, comfort    Progress made toward(s) clinical / shift goals:      Patient is not progressing towards the following goals:

## 2023-02-28 NOTE — ED NOTES
Med rec updated and complete. Allergies reviewed. Confirmed name and date of birth.  Pt reports that she has not taken any medications > 1 year.      All medications removed from med rec.    Home pharmacy [facility] [phone]

## 2023-02-28 NOTE — DIETARY
"Nutrition services: Day 1 of admit.  Sanam Andre is a 62 y.o. female with admitting DX of acute on chronic heart failure.     Consult received for unsure weight loss (MST 3).       Assessment:  Height: 154.9 cm (5' 0.98\")  Weight: 51.3 kg (113 lb 1.5 oz) via bed scale   Body mass index is 21.38 kg/m²., BMI classification: Normal   Diet/Intake: Cardiac with 2 gram sodium and 2000 mL fluid restriction. 50-75% x1 meal.     Evaluation:   Pt with unsure weight loss. Per chart review pt's with limited weight hx however weight appears stable for >1 year.   Current clinical picture and MD progress notes reviewed. Per MD note no recent dietary changes. Pt with HFrED and lower extremity swelling.   Labs (2/28) Na 134 (L), K+ 3.4 (L), corrected ca 10.8 (H)  Meds: Vit B12, MVI, Kdur, senna (refused), thiamine, lasix (2/27)  Skin: 1+ bilateral lower extremity edema noted   +BM pta     Malnutrition Risk: Pt does not meet criteria per ASPEN guidelines    Recommendations/Plan:  Diet as tolerated.    Encourage intake of all meals >50%, if intake <50% consider nutritional supplements to help meet estimated needs.   Document intake of all meals as % taken in ADL's to provide interdisciplinary communication across all shifts.   Monitor weight.  Nutrition rep will continue to see patient for ongoing meal and snack preferences.     RD to monitor per department policy       "

## 2023-02-28 NOTE — ASSESSMENT & PLAN NOTE
-reports occasional incontinence in the AM and late PM, has been ongoing for months if not more  -ED administered ceftriaxone  PLAN  -low clinical concern for UTI at this time  -will continue to monitor

## 2023-02-28 NOTE — ED NOTES
Patient refusing straight catheter. Bed pan placed to collect urine sample. Patient unable to void a this time. ERP notified.

## 2023-02-28 NOTE — H&P
"[facility] Internal Medicine History & Physical Note    Date of Service  2/28/2023    [facility] Team: MARCI   Attending: Orlando Day M.d.  Senior Resident: Dr. Linn    Contact Number: [phone]    Primary Care Physician  RUDDY Avitia    Consultants  None    Code Status  Full Code    Chief Complaint  Chief Complaint   Patient presents with    Leg Swelling     X1 month    ALOC     X3 days     Incontinence     X3 days    Rash       History of Presenting Illness (HPI):   Sanam Andre is a 62 y.o. female with PMH that includes HFrED (last echo 11/23: 45%) who presented 2/27/2023 with complaints of lower extremity swelling.    She shares for several weeks she has noticed progressive increase in swelling in her legs. She denies any recent chest pain, no recent dietary changes. She does mention that she has not seen any doctors for a couple years now. She was previously on lisinopril but stopped taking it after she ran out, noting \"I know I should have called for more, but I just didn't\". Is not currently taking any medications at home.    She mentions also that for several months has been having episodes of urinary leakage. In the ED had a bladder scan and straight cath that were negative for retention.    I discussed the plan of care with patient.    Review of Systems  Review of Systems   Constitutional:  Negative for chills, fever and malaise/fatigue.   HENT:  Negative for nosebleeds and sore throat.    Eyes:  Negative for blurred vision, double vision and photophobia.   Respiratory:  Negative for cough, shortness of breath and wheezing.    Cardiovascular:  Positive for leg swelling. Negative for chest pain, palpitations and orthopnea.   Gastrointestinal:  Negative for abdominal pain, blood in stool, heartburn, melena, nausea and vomiting.   Genitourinary:  Negative for dysuria and urgency.   Musculoskeletal:  Negative for myalgias and neck pain.   Neurological:  Negative for dizziness, sensory change, " "speech change, weakness and headaches.   Psychiatric/Behavioral:  Positive for substance abuse (etoh). Negative for depression and suicidal ideas.      Past Medical History   has a past medical history of Arrhythmia, Hepatitis C, Hypertension, Pain, and Rheumatoid arthritis(714.0).    Surgical History   has a past surgical history that includes gyn surgery (2000); other (1976); knee arthroplasty total (9/15/2010); and orif, fracture, femur (Right, 11/3/2021).     Family History  family history is not on file.   Family history reviewed with patient.     Social History  Tobacco: vapes occasionally, reports prior tobacco use but quit months ago  Alcohol: 3-4 shots of \"99 vodka\"  Recreational drugs (illegal or prescription): cannabis  Employment: not employed  Living Situation: lives with daughter in [place]  Recent Travel: Denies  Primary Care Provider: Reviewed [facility] although has not seen them in years  Other (stressors, spirituality, exposures): NA    Allergies  Allergies   Allergen Reactions    Toradol [Ketorolac Tromethamine]      Patient reports \"severe headache for 2 hours\" post administration    Codeine Vomiting    Iodine Anaphylaxis     Reaction took place during CT and IV dye injection       Medications  Prior to Admission Medications   Prescriptions Last Dose Informant Patient Reported? Taking?   Melatonin 10 MG Tab 2/26/2023 at 2130  Yes Yes   Sig: Take 10 mg by mouth at bedtime.      Facility-Administered Medications: None       Physical Exam  Temp:  [36.8 °C (98.2 °F)] 36.8 °C (98.2 °F)  Pulse:  [] 82  Resp:  [13-20] 20  BP: (142-179)/() 145/72  SpO2:  [88 %-97 %] 96 %  Blood Pressure: (!) 142/75   Temperature: 36.8 °C (98.2 °F)   Pulse: 89   Respiration: 20   Pulse Oximetry: 93 %       Physical Exam  Vitals and nursing note reviewed.   Constitutional:       General: She is not in acute distress.     Appearance: Normal appearance. She is not ill-appearing or toxic-appearing.   HENT:      " Head: Normocephalic and atraumatic.      Mouth/Throat:      Mouth: Mucous membranes are moist.   Eyes:      General: No scleral icterus.     Extraocular Movements: Extraocular movements intact.      Pupils: Pupils are equal, round, and reactive to light.   Cardiovascular:      Rate and Rhythm: Normal rate.      Pulses: Normal pulses.   Pulmonary:      Effort: Pulmonary effort is normal. No respiratory distress.   Abdominal:      General: There is no distension.      Palpations: Abdomen is soft.      Tenderness: There is no abdominal tenderness.   Musculoskeletal:      Right lower leg: Edema present.      Left lower leg: Edema present.   Skin:     General: Skin is warm and dry.   Neurological:      General: No focal deficit present.      Mental Status: She is alert.      Cranial Nerves: No cranial nerve deficit.      Comments: AOX2-3   Psychiatric:         Mood and Affect: Mood normal.         Behavior: Behavior normal.       Laboratory:  Recent Labs     02/27/23  1554   WBC 6.9   RBC 4.31   HEMOGLOBIN 14.8   HEMATOCRIT 43.8   .6*   MCH 34.3*   MCHC 33.8   RDW 49.6   PLATELETCT 231   MPV 9.7     Recent Labs     02/27/23  1554   SODIUM 137   POTASSIUM 3.9   CHLORIDE 106   CO2 15*   GLUCOSE 85   BUN 26*   CREATININE 0.58   CALCIUM 10.8*     Recent Labs     02/27/23  1554   ALTSGPT 36   ASTSGOT 77*   ALKPHOSPHAT 108*   TBILIRUBIN 0.9   GLUCOSE 85         Recent Labs     02/27/23  1900   NTPROBNP 3572*         Recent Labs     02/27/23  1900   TROPONINT 41*       Imaging:  CT-CTA CHEST PULMONARY ARTERY W/ RECONS   Final Result         1.  No pulmonary embolus appreciated.   2.  Cardiomegaly      DX-CHEST-PORTABLE (1 VIEW)   Final Result      Enlarged cardiac silhouette.      No acute abnormality.      CT-HEAD W/O   Final Result      1.  Chronic microvascular ischemic type changes.   2.  No acute intracranial abnormality.         EC-ECHOCARDIOGRAM COMPLETE W/O CONT    (Results Pending)       X-Ray:  I have  "personally reviewed the images and compared with prior images.    Assessment/Plan:  Problem Representation:   I anticipate this patient will require at least two midnights for appropriate medical management, necessitating inpatient admission because of need for diuresis.    Patient will need a Telemetry bed on MEDICAL service .  The need is secondary to cardiac monitoring while diuresing.    * Acute on chronic heart failure, unspecified heart failure type (HCC)- (present on admission)  Assessment & Plan  Last ECHO 11/21: 45%  -reportedly was on lisinopril on the past however stopped at least a year ago \"I ran out and I didn't bother to call for a refill\"  -denies any recent chest pain  -was not on lasix prior  PLAN  -40mg IV lasix daily  -Strict I/Os, daily weights  -Cardiac diet, 2000ml fluid restrict, 2g sodium restrict  -ECHO ordered  -A1c, Lipid panel, TSH with am labs    Alcohol use  Assessment & Plan  -reports 3+ drinks of vodka daily  -last drink estimated 2/26/23  PLAN  -IV thiamine x3 days, folate, multivitamin  -no CIWA at this time but low threshold to start if she starts to withdraw    Tobacco use  Assessment & Plan  -vapes intermittently   -nicotine replacement     Ketonuria  Assessment & Plan  -likely from etoh use    Urinary incontinence  Assessment & Plan  -reports occasional incontinence in the AM and late PM, has been ongoing for months if not more  -ED administered ceftriaxone  PLAN  -low clinical concern for UTI at this time  -will continue to monitor    D-dimer, elevated  Assessment & Plan  -CT PE negative for PE    Pain, chronic  Assessment & Plan  -pain pump in place  -UDS positive for methadone    Low bicarbonate  Assessment & Plan  15 on presentation  -unclear etilogy, possibly secondary to stool loses   PLAN  -continue to monitor        VTE prophylaxis: enoxaparin ppx    "

## 2023-03-01 ENCOUNTER — APPOINTMENT (OUTPATIENT)
Dept: CARDIOLOGY | Facility: MEDICAL CENTER | Age: 63
DRG: 291 | End: 2023-03-01
Attending: STUDENT IN AN ORGANIZED HEALTH CARE EDUCATION/TRAINING PROGRAM
Payer: MEDICARE

## 2023-03-01 ENCOUNTER — PATIENT OUTREACH (OUTPATIENT)
Dept: SCHEDULING | Facility: IMAGING CENTER | Age: 63
End: 2023-03-01

## 2023-03-01 ENCOUNTER — PATIENT OUTREACH (OUTPATIENT)
Dept: SCHEDULING | Facility: IMAGING CENTER | Age: 63
End: 2023-03-01
Payer: MEDICARE

## 2023-03-01 ENCOUNTER — PHARMACY VISIT (OUTPATIENT)
Dept: PHARMACY | Facility: MEDICAL CENTER | Age: 63
End: 2023-03-01
Payer: COMMERCIAL

## 2023-03-01 VITALS
DIASTOLIC BLOOD PRESSURE: 81 MMHG | SYSTOLIC BLOOD PRESSURE: 135 MMHG | WEIGHT: 112.88 LBS | RESPIRATION RATE: 18 BRPM | HEIGHT: 61 IN | OXYGEN SATURATION: 94 % | HEART RATE: 88 BPM | TEMPERATURE: 97.9 F | BODY MASS INDEX: 21.31 KG/M2

## 2023-03-01 PROBLEM — R82.4 KETONURIA: Status: RESOLVED | Noted: 2023-02-27 | Resolved: 2023-03-01

## 2023-03-01 PROBLEM — I50.23 ACUTE ON CHRONIC HFREF (HEART FAILURE WITH REDUCED EJECTION FRACTION) (HCC): Status: ACTIVE | Noted: 2023-02-27

## 2023-03-01 PROBLEM — I50.43 ACUTE ON CHRONIC HEART FAILURE WITH REDUCED EJECTION FRACTION AND DIASTOLIC DYSFUNCTION (HCC): Status: ACTIVE | Noted: 2023-02-27

## 2023-03-01 PROBLEM — R79.89 D-DIMER, ELEVATED: Status: RESOLVED | Noted: 2023-02-27 | Resolved: 2023-03-01

## 2023-03-01 PROBLEM — E87.8 LOW BICARBONATE: Status: RESOLVED | Noted: 2023-02-27 | Resolved: 2023-03-01

## 2023-03-01 LAB
ALBUMIN SERPL BCP-MCNC: 3.6 G/DL (ref 3.2–4.9)
ALBUMIN/GLOB SERPL: 0.9 G/DL
ALP SERPL-CCNC: 98 U/L (ref 30–99)
ALT SERPL-CCNC: 26 U/L (ref 2–50)
ANION GAP SERPL CALC-SCNC: 11 MMOL/L (ref 7–16)
AST SERPL-CCNC: 33 U/L (ref 12–45)
BILIRUB SERPL-MCNC: 0.5 MG/DL (ref 0.1–1.5)
BUN SERPL-MCNC: 25 MG/DL (ref 8–22)
CALCIUM ALBUM COR SERPL-MCNC: 10 MG/DL (ref 8.5–10.5)
CALCIUM SERPL-MCNC: 9.7 MG/DL (ref 8.5–10.5)
CHLORIDE SERPL-SCNC: 102 MMOL/L (ref 96–112)
CO2 SERPL-SCNC: 24 MMOL/L (ref 20–33)
CREAT SERPL-MCNC: 0.79 MG/DL (ref 0.5–1.4)
EKG IMPRESSION: NORMAL
GFR SERPLBLD CREATININE-BSD FMLA CKD-EPI: 84 ML/MIN/1.73 M 2
GLOBULIN SER CALC-MCNC: 3.9 G/DL (ref 1.9–3.5)
GLUCOSE SERPL-MCNC: 129 MG/DL (ref 65–99)
LV EJECT FRACT  99904: 30
LV EJECT FRACT MOD 2C 99903: 37.91
LV EJECT FRACT MOD 4C 99902: 23.1
LV EJECT FRACT MOD BP 99901: 28.59
MAGNESIUM SERPL-MCNC: 3.2 MG/DL (ref 1.5–2.5)
MAGNESIUM SERPL-MCNC: 3.8 MG/DL (ref 1.5–2.5)
POTASSIUM SERPL-SCNC: 3.2 MMOL/L (ref 3.6–5.5)
POTASSIUM SERPL-SCNC: 4.2 MMOL/L (ref 3.6–5.5)
PROT SERPL-MCNC: 7.5 G/DL (ref 6–8.2)
SODIUM SERPL-SCNC: 137 MMOL/L (ref 135–145)

## 2023-03-01 PROCEDURE — 80053 COMPREHEN METABOLIC PANEL: CPT

## 2023-03-01 PROCEDURE — 700111 HCHG RX REV CODE 636 W/ 250 OVERRIDE (IP): Performed by: STUDENT IN AN ORGANIZED HEALTH CARE EDUCATION/TRAINING PROGRAM

## 2023-03-01 PROCEDURE — 700111 HCHG RX REV CODE 636 W/ 250 OVERRIDE (IP)

## 2023-03-01 PROCEDURE — A9270 NON-COVERED ITEM OR SERVICE: HCPCS | Performed by: STUDENT IN AN ORGANIZED HEALTH CARE EDUCATION/TRAINING PROGRAM

## 2023-03-01 PROCEDURE — RXMED WILLOW AMBULATORY MEDICATION CHARGE: Performed by: STUDENT IN AN ORGANIZED HEALTH CARE EDUCATION/TRAINING PROGRAM

## 2023-03-01 PROCEDURE — 700102 HCHG RX REV CODE 250 W/ 637 OVERRIDE(OP): Performed by: STUDENT IN AN ORGANIZED HEALTH CARE EDUCATION/TRAINING PROGRAM

## 2023-03-01 PROCEDURE — 93306 TTE W/DOPPLER COMPLETE: CPT | Mod: 26 | Performed by: INTERNAL MEDICINE

## 2023-03-01 PROCEDURE — RXMED WILLOW AMBULATORY MEDICATION CHARGE

## 2023-03-01 PROCEDURE — 700117 HCHG RX CONTRAST REV CODE 255: Performed by: INTERNAL MEDICINE

## 2023-03-01 PROCEDURE — 700102 HCHG RX REV CODE 250 W/ 637 OVERRIDE(OP)

## 2023-03-01 PROCEDURE — 99223 1ST HOSP IP/OBS HIGH 75: CPT | Performed by: INTERNAL MEDICINE

## 2023-03-01 PROCEDURE — 93306 TTE W/DOPPLER COMPLETE: CPT

## 2023-03-01 PROCEDURE — 700105 HCHG RX REV CODE 258: Performed by: STUDENT IN AN ORGANIZED HEALTH CARE EDUCATION/TRAINING PROGRAM

## 2023-03-01 PROCEDURE — A9270 NON-COVERED ITEM OR SERVICE: HCPCS

## 2023-03-01 PROCEDURE — 84132 ASSAY OF SERUM POTASSIUM: CPT | Mod: XU

## 2023-03-01 PROCEDURE — 99239 HOSP IP/OBS DSCHRG MGMT >30: CPT | Mod: GC | Performed by: STUDENT IN AN ORGANIZED HEALTH CARE EDUCATION/TRAINING PROGRAM

## 2023-03-01 PROCEDURE — 83735 ASSAY OF MAGNESIUM: CPT | Mod: 91

## 2023-03-01 PROCEDURE — 93010 ELECTROCARDIOGRAM REPORT: CPT | Performed by: INTERNAL MEDICINE

## 2023-03-01 RX ORDER — POTASSIUM CHLORIDE 20 MEQ/1
40 TABLET, EXTENDED RELEASE ORAL
Status: DISCONTINUED | OUTPATIENT
Start: 2023-03-01 | End: 2023-03-01 | Stop reason: ALTCHOICE

## 2023-03-01 RX ORDER — LOSARTAN POTASSIUM 25 MG/1
25 TABLET ORAL DAILY
Qty: 30 TABLET | Refills: 0 | Status: SHIPPED | OUTPATIENT
Start: 2023-03-01

## 2023-03-01 RX ORDER — POTASSIUM CHLORIDE 20 MEQ/1
40 TABLET, EXTENDED RELEASE ORAL
Status: DISCONTINUED | OUTPATIENT
Start: 2023-03-01 | End: 2023-03-01

## 2023-03-01 RX ORDER — POTASSIUM CHLORIDE 20 MEQ/1
20 TABLET, EXTENDED RELEASE ORAL DAILY
Qty: 30 TABLET | Refills: 0 | Status: SHIPPED | OUTPATIENT
Start: 2023-03-01 | End: 2023-03-01 | Stop reason: SDUPTHER

## 2023-03-01 RX ORDER — ATORVASTATIN CALCIUM 20 MG/1
20 TABLET, FILM COATED ORAL EVERY EVENING
Qty: 30 TABLET | Refills: 0 | Status: SHIPPED | OUTPATIENT
Start: 2023-03-01

## 2023-03-01 RX ORDER — POTASSIUM CHLORIDE 7.45 MG/ML
10 INJECTION INTRAVENOUS
Status: DISCONTINUED | OUTPATIENT
Start: 2023-03-01 | End: 2023-03-01

## 2023-03-01 RX ORDER — POTASSIUM CHLORIDE 20 MEQ/1
40 TABLET, EXTENDED RELEASE ORAL EVERY 4 HOURS
Status: DISCONTINUED | OUTPATIENT
Start: 2023-03-01 | End: 2023-03-01

## 2023-03-01 RX ORDER — ASPIRIN 81 MG/1
81 TABLET ORAL DAILY
Qty: 30 TABLET | Refills: 0 | Status: SHIPPED | OUTPATIENT
Start: 2023-03-01

## 2023-03-01 RX ORDER — DAPAGLIFLOZIN 10 MG/1
10 TABLET, FILM COATED ORAL DAILY
Status: DISCONTINUED | OUTPATIENT
Start: 2023-03-01 | End: 2023-03-01 | Stop reason: HOSPADM

## 2023-03-01 RX ORDER — FUROSEMIDE 40 MG/1
40 TABLET ORAL 2 TIMES DAILY
Qty: 30 TABLET | Refills: 2 | Status: SHIPPED | OUTPATIENT
Start: 2023-03-01

## 2023-03-01 RX ORDER — ATORVASTATIN CALCIUM 20 MG/1
20 TABLET, FILM COATED ORAL EVERY EVENING
Status: DISCONTINUED | OUTPATIENT
Start: 2023-03-01 | End: 2023-03-01 | Stop reason: HOSPADM

## 2023-03-01 RX ORDER — SPIRONOLACTONE 25 MG/1
25 TABLET ORAL DAILY
Qty: 30 TABLET | Refills: 0 | Status: SHIPPED | OUTPATIENT
Start: 2023-03-01

## 2023-03-01 RX ORDER — METOPROLOL SUCCINATE 25 MG/1
25 TABLET, EXTENDED RELEASE ORAL DAILY
Qty: 30 TABLET | Refills: 0 | Status: SHIPPED | OUTPATIENT
Start: 2023-03-01

## 2023-03-01 RX ORDER — POTASSIUM CHLORIDE 20 MEQ/1
40 TABLET, EXTENDED RELEASE ORAL EVERY 4 HOURS
Status: COMPLETED | OUTPATIENT
Start: 2023-03-01 | End: 2023-03-01

## 2023-03-01 RX ORDER — POTASSIUM CHLORIDE 20 MEQ/1
40 TABLET, EXTENDED RELEASE ORAL 2 TIMES DAILY
Qty: 30 TABLET | Refills: 2 | Status: SHIPPED | OUTPATIENT
Start: 2023-03-01

## 2023-03-01 RX ORDER — HYDROXYZINE HYDROCHLORIDE 25 MG/1
25 TABLET, FILM COATED ORAL ONCE
Status: COMPLETED | OUTPATIENT
Start: 2023-03-01 | End: 2023-03-01

## 2023-03-01 RX ORDER — SPIRONOLACTONE 25 MG/1
25 TABLET ORAL
Status: DISCONTINUED | OUTPATIENT
Start: 2023-03-01 | End: 2023-03-01 | Stop reason: HOSPADM

## 2023-03-01 RX ORDER — FUROSEMIDE 40 MG/1
20 TABLET ORAL DAILY
Qty: 30 TABLET | Refills: 0 | Status: SHIPPED | OUTPATIENT
Start: 2023-03-01 | End: 2023-03-01 | Stop reason: SDUPTHER

## 2023-03-01 RX ADMIN — THERA TABS 1 TABLET: TAB at 05:17

## 2023-03-01 RX ADMIN — CYANOCOBALAMIN TAB 500 MCG 1000 MCG: 500 TAB at 05:17

## 2023-03-01 RX ADMIN — SPIRONOLACTONE 25 MG: 25 TABLET ORAL at 10:51

## 2023-03-01 RX ADMIN — POTASSIUM CHLORIDE 40 MEQ: 1500 TABLET, EXTENDED RELEASE ORAL at 09:20

## 2023-03-01 RX ADMIN — ASPIRIN 81 MG: 81 TABLET, COATED ORAL at 10:51

## 2023-03-01 RX ADMIN — SENNOSIDES AND DOCUSATE SODIUM 2 TABLET: 50; 8.6 TABLET ORAL at 05:17

## 2023-03-01 RX ADMIN — POTASSIUM CHLORIDE 10 MEQ: 7.46 INJECTION, SOLUTION INTRAVENOUS at 09:25

## 2023-03-01 RX ADMIN — HYDROXYZINE HYDROCHLORIDE 25 MG: 25 TABLET, FILM COATED ORAL at 12:05

## 2023-03-01 RX ADMIN — THIAMINE HYDROCHLORIDE 100 MG: 100 INJECTION, SOLUTION INTRAMUSCULAR; INTRAVENOUS at 05:27

## 2023-03-01 RX ADMIN — HUMAN ALBUMIN MICROSPHERES AND PERFLUTREN 3 ML: 10; .22 INJECTION, SOLUTION INTRAVENOUS at 11:45

## 2023-03-01 RX ADMIN — FUROSEMIDE 40 MG: 10 INJECTION, SOLUTION INTRAMUSCULAR; INTRAVENOUS at 05:18

## 2023-03-01 RX ADMIN — POTASSIUM CHLORIDE 40 MEQ: 1500 TABLET, EXTENDED RELEASE ORAL at 12:05

## 2023-03-01 NOTE — CONSULTS
Cardiology Initial Consult Note    Date of note:    3/1/2023      Consulting Physician: Antoni Sheridan M.D.    Name:   Sanam Andre   YOB: 1960  Age:   62 y.o.  female   MRN:   [id number]      Reason for Consultation: New onset congestive heart failure    HPI:  Sanam Andre is a 62 y.o. female with a history of LV dysfunction with prior EF 45%, atypical LBBB, dyslipidemia, alcohol use and dependence and essential hypertension who presented to the hospital on 2/27/2023 with symptoms of decompensated heart failure.  Patient states that few weeks ago she started noticing bilateral lower extremity edema.  Symptoms slowly progressed which prompted her visit to the ER.    Has history of heart failure with prior EF 45%.  Is on no medications at home.  Never had ischemic evaluation.  In regards to alcohol use, drinks 4-5 shots of whiskey per day for many many years.  Since admission, she received IV diuresis and has done quite well.  Currently, she denies any acute cardiovascular complaints.  Believes that she is ready to go home with no orthopnea, PND, dyspnea on exertion, palpitations, lightheadedness or dizziness.  Denies any illicit drug use.      ROS  A complete ROS was performed and is negative except for pertinent positives mentioned in HPI.      Past Medical History:   Diagnosis Date    Arrhythmia     hx of left bundle branch block    Hepatitis C     Hypertension     Pain     knees 7/10    Rheumatoid arthritis(714.0)        Past Surgical History:   Procedure Laterality Date    ORIF, FRACTURE, FEMUR Right 11/3/2021    Procedure: ORIF, FRACTURE, FEMUR - DISTAL;  Surgeon: Terrence Rendon M.D.;  Location: SURGERY [facility];  Service: Orthopedics    KNEE ARTHROPLASTY TOTAL  9/15/2010    Performed by [facility] at SURGERY [facility]    GYN SURGERY  2000    tubal ligation    OTHER  1976    bunionectomy         Medications Prior to Admission   Medication Sig Dispense Refill Last  Dose    Melatonin 10 MG Tab Take 10 mg by mouth at bedtime.   2/26/2023 at 2130     Current Facility-Administered Medications   Medication Dose Route Frequency Provider Last Rate Last Admin    spironolactone (ALDACTONE) tablet 25 mg  25 mg Oral Q DAY Charisse MARY Paz M.D.   25 mg at 03/01/23 1051    aspirin EC (ECOTRIN) tablet 81 mg  81 mg Oral DAILY Charisse MARY Paz M.D.   81 mg at 03/01/23 1051    atorvastatin (LIPITOR) tablet 20 mg  20 mg Oral Q EVENING Charisse HARVEY Harrington.DUche        furosemide (LASIX) injection 40 mg  40 mg Intravenous BID DIURETIC Charisse CALEB HarringtonD.   40 mg at 03/01/23 0518    metoprolol SR (TOPROL XL) tablet 25 mg  25 mg Oral Q DAY Charisse MARY Paz M.D.   25 mg at 02/28/23 1733    losartan (COZAAR) tablet 25 mg  25 mg Oral Q DAY Charisse MARY Paz M.D.   25 mg at 02/28/23 1732    senna-docusate (PERICOLACE or SENOKOT S) 8.6-50 MG per tablet 2 Tablet  2 Tablet Oral BID Roberto Linn M.D.   2 Tablet at 03/01/23 0517    And    polyethylene glycol/lytes (MIRALAX) PACKET 1 Packet  1 Packet Oral QDAY PRN Roberto Linn M.D.        And    magnesium hydroxide (MILK OF MAGNESIA) suspension 30 mL  30 mL Oral QDAY PRN Roberto Linn M.D.        And    bisacodyl (DULCOLAX) suppository 10 mg  10 mg Rectal QDAY PRN Roberto Linn M.D.        enoxaparin (Lovenox) inj 40 mg  40 mg Subcutaneous DAILY AT 1800 Roberto Linn M.D.   40 mg at 02/28/23 1732    acetaminophen (Tylenol) tablet 650 mg  650 mg Oral Q6HRS PRN Roberto Linn M.D.        nicotine (NICODERM) 14 MG/24HR 14 mg  14 mg Transdermal Daily-0600 Roberto Linn M.D.        And    nicotine polacrilex (NICORETTE) 2 MG piece 2 mg  2 mg Oral Q HOUR PRN Roberto Linn M.D.        thiamine (B-1) IVPB 100 mg in 100 mL D5W (premix)  100 mg Intravenous DAILY Roberto Linn M.D.   Stopped at 03/01/23 0557    cyanocobalamin (VITAMIN B-12) tablet 1,000 mcg  1,000 mcg Oral DAILY Roberto Linn M.D.   1,000 mcg at 03/01/23 0517    multivitamin tablet 1 Tablet   "1 Tablet Oral DAILY Roberto Linn M.D.   1 Tablet at 03/01/23 0517         Allergies   Allergen Reactions    Toradol [Ketorolac Tromethamine]      Patient reports \"severe headache for 2 hours\" post administration    Codeine Vomiting    Iodine Anaphylaxis     Reaction took place during CT and IV dye injection         Family history: Grandmother had CABG and CHF.  No other family history of heart failure.      Social History     Socioeconomic History    Marital status:      Spouse name: Not on file    Number of children: Not on file    Years of education: Not on file    Highest education level: Not on file   Occupational History    Not on file   Tobacco Use    Smoking status: Former    Smokeless tobacco: Never    Tobacco comments:     3/4 ppd for 34 years   Substance and Sexual Activity    Alcohol use: No     Comment: 1 per week    Drug use: No    Sexual activity: Not on file   Other Topics Concern    Not on file   Social History Narrative    Not on file     Social Determinants of Health     Financial Resource Strain: Not on file   Food Insecurity: Not on file   Transportation Needs: Not on file   Physical Activity: Not on file   Stress: Not on file   Social Connections: Not on file   Intimate Partner Violence: Not on file   Housing Stability: Not on file         Intake/Output Summary (Last 24 hours) at 3/1/2023 1447  Last data filed at 3/1/2023 0900  Gross per 24 hour   Intake 240 ml   Output 1400 ml   Net -1160 ml        Physical Exam  Body mass index is 21.34 kg/m².  /81   Pulse 88   Temp 36.6 °C (97.9 °F) (Temporal)   Resp 18   Ht 1.549 m (5' 0.98\")   Wt 51.2 kg (112 lb 14 oz)   SpO2 94%   Vitals:    02/28/23 2301 03/01/23 0353 03/01/23 0800 03/01/23 1158   BP: 133/85 (!) 134/90 131/85 135/81   Pulse: 82 72 77 88   Resp: 14 18 16 18   Temp: 36.6 °C (97.9 °F) 36.4 °C (97.5 °F) 36.2 °C (97.2 °F) 36.6 °C (97.9 °F)   TempSrc: Temporal Temporal Temporal Temporal   SpO2: 90% 93% 95% 94%   Weight:     "   Height:         Oxygen Therapy:  Pulse Oximetry: 94 %, O2 (LPM): 0, O2 Delivery Device: None - Room Air    General: Well appearing and in no apparent distress  Eyes: nl conjunctiva  ENT: OP clear  Neck: JVP not elevated,  no carotid bruits  Lungs: normal respiratory effort, CTAB  Heart: RRR, no murmurs, no rubs or gallops, no edema bilateral lower extremities. No LV/RV heave on cardiac palpatation. 2+ bilateral radial pulses.  2+ bilateral dp pulses.   Abdomen: soft, non tender, non distended, no masses, normal bowel sounds.  No HSM.  Extremities/MSK: no clubbing, no cyanosis  Neurological: No focal sensory deficits  Psychiatric: Appropriate affect, A/O x 3  Skin: Warm extremities      Labs (personally reviewed and notable for):   Lab Results   Component Value Date/Time    SODIUM 137 03/01/2023 01:00 AM    POTASSIUM 4.2 03/01/2023 11:27 AM    CHLORIDE 102 03/01/2023 01:00 AM    CO2 24 03/01/2023 01:00 AM    GLUCOSE 129 (H) 03/01/2023 01:00 AM    BUN 25 (H) 03/01/2023 01:00 AM    CREATININE 0.79 03/01/2023 01:00 AM    CREATININE 0.7 04/06/2008 05:00 AM      Lab Results   Component Value Date/Time    WBC 5.8 02/28/2023 02:21 AM    RBC 4.53 02/28/2023 02:21 AM    HEMOGLOBIN 15.7 02/28/2023 02:21 AM    HEMATOCRIT 44.7 02/28/2023 02:21 AM    MCV 98.7 (H) 02/28/2023 02:21 AM    MCH 34.7 (H) 02/28/2023 02:21 AM    MCHC 35.1 (H) 02/28/2023 02:21 AM    MPV 9.3 02/28/2023 02:21 AM    NEUTSPOLYS 87.40 (H) 02/28/2023 02:21 AM    LYMPHOCYTES 9.10 (L) 02/28/2023 02:21 AM    MONOCYTES 1.90 02/28/2023 02:21 AM    EOSINOPHILS 0.50 02/28/2023 02:21 AM    BASOPHILS 0.20 02/28/2023 02:21 AM    ANISOCYTOSIS 1+ 08/30/2021 03:05 AM      Lab Results   Component Value Date/Time    CHOLSTRLTOT 186 02/28/2023 02:21 AM     (H) 02/28/2023 02:21 AM    HDL 49 02/28/2023 02:21 AM    TRIGLYCERIDE 80 02/28/2023 02:21 AM       Lab Results   Component Value Date/Time    TROPONINT 36 (H) 02/28/2023 0221     Lab Results   Component Value  Date/Time    NTPROBNP 3572 (H) 02/27/2023 1900         Cardiac Imaging and Procedures Review:    EKG dated 3/1/2023: My personal interpretation is sinus rhythm, atypical LBBB,  ms    Echo dated 3/1/2023: My personal interpretation is severely reduced left ventricular systolic function with EF 25-30%.  Abnormal septal motion      Radiology test Review:  EC-ECHOCARDIOGRAM COMPLETE W/ CONT   Final Result      CT-CTA CHEST PULMONARY ARTERY W/ RECONS   Final Result         1.  No pulmonary embolus appreciated.   2.  Cardiomegaly      DX-CHEST-PORTABLE (1 VIEW)   Final Result      Enlarged cardiac silhouette.      No acute abnormality.      CT-HEAD W/O   Final Result      1.  Chronic microvascular ischemic type changes.   2.  No acute intracranial abnormality.             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  We discussed disease pathophysiology, possible etiologies, natural progression and treatment options as well.  No prior history of MI.  Previous echocardiogram from 2021 showed LVEF 45%.  Patient continues to drink heavily with no severity reduced left ventricular systolic function.  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  However, we discussed doing ischemic evaluation as an outpatient  if EF does not recover with alcohol abstinence.  -- We spent extra time discussing importance of alcohol cessation which she is amenable to.  Lives with her daughter and is currently in the process of transitioning to living with her other daughter.  Has good social support.  -- In regards to treatment options, we discussed the importance of continuing optimal heart failure therapy which will help with recovery of EF.  -- Initiate Farxiga 10 mg daily for HFrEF.  -- She is currently on IV diuresis with Lasix 40 mg twice daily.  Transition to p.o. Lasix 40 mg twice daily along with potassium supplement 40 mEq twice daily.  Labs reviewed with persistent hypokalemia related to diuresis.  -- Continue optimal heart failure therapy with losartan 25 mg, Toprol-XL 25 mg and spironolactone 25 mg daily.  -- Patient will be a candidate for outpatient BiV ICD if there is no recovery to LVEF in 3 months being on optimal heart failure therapy and with alcohol cessation.    Recommendations discussed with medicine resident.    Thank you for allowing me to participate in the care of this patient.  Please contact me with any questions.      Kamran Simpson M.D.  Cardiologist, [facility] [place]   [phone]    This note was generated using voice recognition software which has a small chance of producing errors of grammar and possibly content. I have made every reasonable attempt to find and correct any obvious errors, but expect that some may not be found prior to finalization of this note.

## 2023-03-01 NOTE — PROGRESS NOTES
[facility] Internal Medicine Daily Progress Note    Date of Service  2/28/2023    R Team: R IM Purple Team   Attending: Antoni Sheridan M.d.  Senior Resident: Dr. Charisse Paz  Intern:  Dr. Rober Armenta  Contact Number: [phone]    Chief Complaint  Sanam Andre is a 62 y.o. female admitted 2/27/2023 with acute hypoxic respiratory failure due to heart failure exacerbation    Hospital Course  Patient is 62-year-old female with past medical history of HFrEF, echo in 11/2021 with LVEF of 45%, suspected nonischemic cardiomyopathy due to alcohol use, who presented at request of her daughter due to reported confusion and complaints of lower extremity swelling going on for several weeks.  She was admitted for heart failure exacerbation and treated with IV diuretics.  Initial labs notable for BUN/creatinine 26/0.58, bicarb 15 with normal anion gap, ammonia 12, NT proBNP elevated at 3572.  Troponin also elevated and trended stable, peaked at 41.  LDL elevated at 121.  Patient admitted and treated with IV diuretics. TSH 1.62.  Chest x-ray without abnormalities, echo ordered and pending    Interval Problem Update  -No lingering confusion today, patient is oriented x4  -Does not remember being confused yesterday, however does report several weeks of leg swelling  -She is not on diuretics as outpatient, but does state medication nonadherence with her heart failure medications  -Also ongoing alcohol use as well, reports anywhere between 3-6 shots of vodka per day  -No reported history of alcohol withdrawals however still within window, so monitoring at this time  -Vitals stable, fluctuating between room air and 2 L nasal cannula.  Initial BP in the systolic 160s to 180s, improved to systolic 110s to 120s,      I have discussed this patient's plan of care and discharge plan at IDT rounds today with Case Management, Nursing, Nursing leadership, and other members of the IDT team.    Consultants/Specialty  none    Code  Status  Full Code    Disposition  Patient is not medically cleared for discharge.   Anticipate discharge to to home with close outpatient follow-up.  I have placed the appropriate orders for post-discharge needs.    Review of Systems  Review of Systems   Constitutional:  Negative for fever.   HENT:  Negative for congestion and sore throat.    Respiratory:  Negative for cough and sputum production.    Cardiovascular:  Positive for leg swelling. Negative for chest pain and orthopnea.   Gastrointestinal:  Negative for abdominal pain.   Musculoskeletal:  Negative for back pain.   Neurological:  Negative for dizziness and headaches.      Physical Exam  Temp:  [36.1 °C (97 °F)-36.7 °C (98.1 °F)] 36.5 °C (97.7 °F)  Pulse:  [81-93] 87  Resp:  [14-20] 14  BP: (118-162)/(72-97) 121/82  SpO2:  [93 %-97 %] 94 %    Physical Exam  Constitutional:       General: She is not in acute distress.     Comments: Thin appearing   HENT:      Head: Normocephalic and atraumatic.      Right Ear: External ear normal.      Left Ear: External ear normal.      Nose: Nose normal. No congestion.      Mouth/Throat:      Mouth: Mucous membranes are moist.      Pharynx: No oropharyngeal exudate.   Eyes:      General: No scleral icterus.     Extraocular Movements: Extraocular movements intact.      Pupils: Pupils are equal, round, and reactive to light.   Neck:      Comments: Plus HJR, no JVD  Cardiovascular:      Rate and Rhythm: Normal rate and regular rhythm.      Pulses: Normal pulses.      Heart sounds: Normal heart sounds. No murmur heard.  Pulmonary:      Effort: Pulmonary effort is normal. No respiratory distress.      Breath sounds: Normal breath sounds.   Abdominal:      Palpations: Abdomen is soft.      Tenderness: There is no abdominal tenderness.      Comments: Some pitting edema noted in dependent areas   Musculoskeletal:         General: Normal range of motion.      Cervical back: Normal range of motion and neck supple.      Comments:  "Edema present bilateral thighs, however none in distal lower extremities   Skin:     General: Skin is warm and dry.      Capillary Refill: Capillary refill takes less than 2 seconds.   Neurological:      General: No focal deficit present.      Mental Status: She is alert and oriented to person, place, and time.      Cranial Nerves: No cranial nerve deficit.   Psychiatric:         Mood and Affect: Mood normal.         Thought Content: Thought content normal.       Fluids    Intake/Output Summary (Last 24 hours) at 2/28/2023 1945  Last data filed at 2/28/2023 1356  Gross per 24 hour   Intake 800 ml   Output 925 ml   Net -125 ml       Laboratory  Recent Labs     02/27/23  1554 02/28/23 0221   WBC 6.9 5.8   RBC 4.31 4.53   HEMOGLOBIN 14.8 15.7   HEMATOCRIT 43.8 44.7   .6* 98.7*   MCH 34.3* 34.7*   MCHC 33.8 35.1*   RDW 49.6 48.0   PLATELETCT 231 236   MPV 9.7 9.3     Recent Labs     02/27/23  1554 02/28/23 0221   SODIUM 137 134*   POTASSIUM 3.9 3.4*   CHLORIDE 106 100   CO2 15* 21   GLUCOSE 85 92   BUN 26* 23*   CREATININE 0.58 0.63   CALCIUM 10.8* 10.5             Recent Labs     02/28/23 0221   TRIGLYCERIDE 80   HDL 49   *       Imaging  CT-CTA CHEST PULMONARY ARTERY W/ RECONS   Final Result         1.  No pulmonary embolus appreciated.   2.  Cardiomegaly      DX-CHEST-PORTABLE (1 VIEW)   Final Result      Enlarged cardiac silhouette.      No acute abnormality.      CT-HEAD W/O   Final Result      1.  Chronic microvascular ischemic type changes.   2.  No acute intracranial abnormality.         EC-ECHOCARDIOGRAM COMPLETE W/O CONT    (Results Pending)        Assessment/Plan  Problem Representation:    * Acute on chronic heart failure, unspecified heart failure type (HCC)- (present on admission)  Assessment & Plan  Last ECHO 11/21: 45%  -reportedly was on lisinopril on the past however stopped at least a year ago \"I ran out and I didn't bother to call for a refill\"  -denies any recent chest pain  -was " not on lasix prior  PLAN  - initiate IV Lasix diuresis to titrate to net negative fluid balance  -Strict I/Os, daily weights  -Cardiac diet, 2000ml fluid restrict, 2g sodium restrict  -ECHO ordered  -5,2, , TSH wnl  -Initiating goal-directed medical therapy with beta-blocker and ACE inhibitor  -D-dimer elevated at 2.04 however CT PE was negative for PE    Tobacco use  Assessment & Plan  -vapes intermittently   -nicotine replacement     Ketonuria  Assessment & Plan  -likely from etoh use    Urinary incontinence  Assessment & Plan  -reports occasional incontinence in the AM and late PM, has been ongoing for months if not more  -ED administered ceftriaxone  PLAN  -low clinical concern for UTI at this time  -will continue to monitor    D-dimer, elevated  Assessment & Plan  -CT PE negative for PE    Pain, chronic  Assessment & Plan  -pain pump in place  -UDS positive for methadone    Low bicarbonate  Assessment & Plan  15 on presentation  -unclear etilogy, possibly secondary to stool losses  -Resolved  PLAN  -continue to monitor    Alcohol use  Assessment & Plan  -reports 3+ drinks of vodka daily  -last drink estimated 2/26/23  PLAN  -IV thiamine x3 days, folate, multivitamin  -no CIWA at this time but low threshold to start if she starts to withdraw    Hypokalemia  Assessment & Plan  - In setting of IV Lasix diuresis  -Replete to maintain K greater than 4, mag greater than 2         VTE prophylaxis: enoxaparin ppx    I have performed a physical exam and reviewed and updated ROS and Plan today (2/28/2023). In review of yesterday's note (2/27/2023), there are no changes except as documented above.

## 2023-03-01 NOTE — HOSPITAL COURSE
Patient is 62-year-old female with past medical history of HFrEF, echo in 11/2021 with LVEF of 45%, suspected nonischemic cardiomyopathy due to alcohol use, who presented at request of her daughter due to reported confusion and complaints of lower extremity swelling going on for several weeks.  She was admitted for heart failure exacerbation and treated with IV diuretics.  Initial labs notable for BUN/creatinine 26/0.58, bicarb 15 with normal anion gap, ammonia 12, NT proBNP elevated at 3572.  Troponin also elevated and trended stable, peaked at 41.  LDL elevated at 121.  Patient admitted and treated with IV diuretics. TSH 1.62.  Chest x-ray without abnormalities, echo ordered and pending

## 2023-03-01 NOTE — CARE PLAN
The patient is Stable - Low risk of patient condition declining or worsening    Shift Goals  Clinical Goals: MONITOR V/S, LAB VALUES  Patient Goals: MAINTAIN REST AND COMFORT  Family Goals: rest, comfort    Progress made toward(s) clinical / shift goals:      Problem: Knowledge Deficit - Standard  Goal: Patient and family/care givers will demonstrate understanding of plan of care, disease process/condition, diagnostic tests and medications  Description: Target End Date:  1-3 days or as soon as patient condition allows    Document in Patient Education    1.  Patient and family/caregiver oriented to unit, equipment, visitation policy and means for communicating concern  2.  Complete/review Learning Assessment  3.  Assess knowledge level of disease process/condition, treatment plan, diagnostic tests and medications  4.  Explain disease process/condition, treatment plan, diagnostic tests and medications  Outcome: Progressing       Patient is not progressing towards the following goals:

## 2023-03-01 NOTE — DISCHARGE INSTRUCTIONS
-follow up at heart failure clinic as outpatient  -start taking aspirin and atorvastatin for heart health  -start taking metoprolol and losartan and spironolactone for your heart failure  -start taking lasix and very important to take potassium supplement with it to help with extra water in your body  -start empagliflozin for heart failure  -important to stop drinking alcohol  -follow up with primary care physician within 2 weeks of discharge; you will need repeat labs  -referral to cardiology has been placed      HF Patient Discharge Instructions  Monitor your weight daily, and maintain a weight chart, to track your weight changes.   Activity as tolerated, unless your Doctor has ordered otherwise.   Follow a low fat, low cholesterol, low salt diet unless instructed otherwise by your Doctor. Read the labels on the back of food products and track your intake of fat, cholesterol and salt.   Fluid Restriction No. If a Fluid Restriction has been ordered by your Doctor, measure fluids with a measuring cup to ensure that you are not exceeding the restriction.   No smoking.  Oxygen No. If your Doctor has ordered that you wear Oxygen at home, it is important to wear it as ordered.  Did you receive an explanation from staff on the importance of taking each of your medications and why it is necessary to keep taking them unless your doctor says to stop? Yes  Were all of your questions answered about how to manage your heart failure and what to do if you have increased signs and symptoms after you go home? Yes  Do you feel like your heart failure care team involved you in the care treatment plan and allowed you to make decisions regarding your care while in the hospital and addressed any discharge needs you might have? Yes    See the educational handout provided at discharge for more information on monitoring your daily weight, activity and diet. This also explains more about Heart Failure, symptoms of a flare-up and some of  the tests that you have undergone.     Warning Signs of a Flare-Up include:  Swelling in the ankles or lower legs.  Shortness of breath, while at rest, or while doing normal activities.   Shortness of breath at night when in bed, or coughing in bed.   Requiring more pillows to sleep at night, or needing to sit up at night to sleep.  Feeling weak, dizzy or fatigued.     When to call your Doctor:  Call [facility] [employer] seven days a week from 8:00 a.m. to 8:00 p.m. for medical questions [phone].  Call your Primary Care Physician or Cardiologist if:   You experience any pain radiating to your jaw or neck.  You have any difficulty breathing.  You experience weight gain of 3 lbs in a day or 5 lbs in a week.   You feel any palpitations or irregular heartbeats.  You become dizzy or lose consciousness.   If you have had an angiogram or had a pacemaker or AICD placed, and experience:  Bleeding, drainage or swelling at the surgical / puncture site.  Fever greater than 100.0 F  Shock from internal defibrillator.  Cool and / or numb extremities.     Please access the [facility] My HF Guide/Heart Failure Interactive Workbook:   [email or web address]

## 2023-03-01 NOTE — PROGRESS NOTES
Monitor Summary:     Rhythm: SR, ST  Rate:   Ectopy: (R) PVC's  Measurements: .20/.14/.36           12 Hour Chart Check

## 2023-03-01 NOTE — HEART FAILURE PROGRAM
Decompensation of heart failure with mid range ejection fraction (HFpEF) in the setting of not taking medications, excessive ETOH use,   EF: 45%    Nurses: Please document HF education in the education tab and populate the HF Care Plan. These tools will guide day to day care of the HF patient.    Providers: below are Guideline Directed Medical Therapy (GDMT) for HFpEF. If any cannot be prescribed by discharge, would you please note the clinical reason for each in your discharge summary? Thanks! Bina  Anticoagulation for atrial arrhythmia, if applicable  Glycemic control for DM + HF, if applicable  Lipid lowering medication for DM + HF, if applicable  Pneumococcal vaccine, if not previously received, If refused PLEASE DOCUMENT  Influenza vaccine for the current flu season, if not previously received If refused PLEASE DOCUMENT  Documentation of nicotine cessation counseling, if applicable  Acute Referral to disease management program specializing in heart failure care- asked that schedulers notify me if patient is not able to be seen at the Heart Failure Clinic  7 calendar day f/u appointment scheduled prior to discharge, appearing on signed after visit summary.

## 2023-03-01 NOTE — CARE PLAN
The patient is Stable - Low risk of patient condition declining or worsening    Shift Goals  Clinical Goals: Monitor for Withdrawals, I/O's, Replace Mag  Patient Goals: Comfort, Rest  Family Goals: rest, comfort    Progress made toward(s) clinical / shift goals:        Problem: Pain - Standard  Goal: Alleviation of pain or a reduction in pain to the patient’s comfort goal AEB utilizing hot/cold modalities to alleviate pain for her Rheumatoid Arthritis for the this RN's shift.   3/1/2023 0321 by Kalina Garcia R.N.  Outcome: Progressing    Problem: Self Care  Goal: Patient will have the ability to perform ADLs independently or with assistance (bathe, groom, dress, toilet and feed) AEB brushing teeth and hair independently for this RN's shift.   Outcome: Progressing     Problem: Infection - Standard  Goal: Patient will remain free from infection AEB keeping hands and body hygiene clean for the duration of this RN's shift.   Outcome: Progressing       Patient is not progressing towards the following goals: NA

## 2023-03-01 NOTE — PROGRESS NOTES
PATIENT ALERT AND ORIENTED X 4, IN NO APPARENT DISTRESS, NO TREMORS OR C/O HEADACHE NOTED, CALM AND COOPERATIVE AT THIS TIME, WILL CONTINUE TO MONITOR.

## 2023-03-02 NOTE — DISCHARGE SUMMARY
[facility] Internal Medicine Discharge Summary    Attending: Dr. Antoni Sheridan  Senior Resident: Dr. Charisse Paz  Intern:  Dr. Rober Armenta  Contact Number: [phone]    CHIEF COMPLAINT ON ADMISSION  Chief Complaint   Patient presents with    Leg Swelling     X1 month    ALOC     X3 days     Incontinence     X3 days    Rash       Reason for Admission  Leg Swollen     Admission Date  2/27/2023    CODE STATUS  Prior    HPI & HOSPITAL COURSE    Patient is 62-year-old female with past medical history of HFrEF, echo in 11/2021 with LVEF of 45%, suspected nonischemic cardiomyopathy due to alcohol use, who presented at request of her daughter due to reported confusion and complaints of lower extremity swelling going on for several weeks.  She was admitted for heart failure exacerbation and treated with IV diuretics.  Initial labs notable for BUN/creatinine 26/0.58, bicarb 15 with normal anion gap, ammonia 12, NT proBNP elevated at 3572.  Troponin also elevated and trended stable, peaked at 41.  LDL elevated at 121.  Patient admitted and treated with IV diuretics. TSH 1.62.  Chest x-ray without abnormalities, echo ordered and demonstrated severely reduced LV systolic function with LVEF 25 to 30%, grade 2 diastolic dysfunction, normal right ventricular size with reduced RV systolic function, no significant valvular abnormalities and normal IVC with inspiratory collapse.  This is notably significantly worsened from prior echo in 11/2021.  Patient was euvolemic on day of discharge.  Given new diagnosis of worsened heart failure with reduced ejection fraction, pt was initiated on appropriate goal-directed medical therapy for heart failure with reduced ejection fraction.  Cardiology was consulted and recommended additional discharge medication of oral Lasix 40 mg twice daily with concomitant potassium supplementation.  She will require alcohol cessation and follow-up echo in 3 months as well as follow-up with cardiology at that time  to discuss potential candidacy for biventricular ICD.    Therefore, she is discharged in fair and stable condition to home with close outpatient follow-up.    The patient met 2-midnight criteria for an inpatient stay at the time of discharge.    Discharge Date  3/1/2023    Physical Exam on Day of Discharge  Physical Exam  Constitutional:       General: She is not in acute distress.     Comments: Thin appearing   HENT:      Head: Normocephalic and atraumatic.      Right Ear: External ear normal.      Left Ear: External ear normal.      Nose: Nose normal. No congestion.      Mouth/Throat:      Mouth: Mucous membranes are moist.      Pharynx: No oropharyngeal exudate.   Eyes:      General: No scleral icterus.     Extraocular Movements: Extraocular movements intact.      Pupils: Pupils are equal, round, and reactive to light.   Neck:      Comments: HJR resolved  Cardiovascular:      Rate and Rhythm: Normal rate and regular rhythm.      Pulses: Normal pulses.      Heart sounds: Normal heart sounds. No murmur heard.  Pulmonary:      Effort: Pulmonary effort is normal. No respiratory distress.      Breath sounds: Normal breath sounds.   Abdominal:      Palpations: Abdomen is soft.      Tenderness: There is no abdominal tenderness.      Comments: Some pitting edema noted in dependent areas   Musculoskeletal:         General: Normal range of motion.      Cervical back: Normal range of motion and neck supple.      Comments: Dependent area edema resolved, no lower extremity edema   Skin:     General: Skin is warm and dry.      Capillary Refill: Capillary refill takes less than 2 seconds.   Neurological:      General: No focal deficit present.      Mental Status: She is alert and oriented to person, place, and time.      Cranial Nerves: No cranial nerve deficit.   Psychiatric:         Mood and Affect: Mood normal.         Thought Content: Thought content normal.       FOLLOW UP ITEMS POST DISCHARGE  -Goal-directed medical  therapy for heart failure with reduced ejection fraction  -Repeat echo in 3 months after optimal medical therapy  -Cardiology follow-up as outpatient  -Alcohol cessation  -Aspirin and statin initiated as well  -Oral Lasix with potassium supplementation also prescribed    DISCHARGE DIAGNOSES  Principal Problem:    Acute on chronic heart failure with reduced ejection fraction and diastolic dysfunction (HCC) POA: Yes  Active Problems:    Hypokalemia POA: Unknown    Alcohol dependence (HCC) POA: Unknown    Pain, chronic POA: Unknown    Urinary incontinence POA: Unknown    Tobacco use POA: Unknown    Hypomagnesemia POA: Yes  Resolved Problems:    Low bicarbonate POA: Unknown    D-dimer, elevated POA: Unknown    Ketonuria POA: Unknown      FOLLOW UP  No future appointments.  Chris Todd A.P.R.NUche  [address] [facility] [address]  [phone]    Go on 3/14/2023  Please go to your follow up appointment with Chris NAQVI on Tuesday March 14, 2023 check in at 2:45pm for a 3:00pm appointment.    Ken NAQVI  [facility]  [address] [place] [address]  Ph. [phone]  Go on 3/8/2023  You are scheduled for a virtual appointment with Ken NAQVI on Wednesday March 8 at 1:00pm.    They will send you a text message with a link to connect you to the virtual appointment.    Abdulmohsin Ahmadjee M.D.  [facility]  [facility] [address] [address]  Ph# [phone]  Go on 3/16/2023  Please go to your follow up appointment with Abdulmohsin Ahmadjee M.D. on Thursday March 16, 2023 at 8:40am.      MEDICATIONS ON DISCHARGE     Medication List        START taking these medications        Instructions   Aspirin Low Dose 81 MG EC tablet  Generic drug: aspirin   Take 1 Tablet by mouth every day.  Dose: 81 mg     atorvastatin 20 MG Tabs  Commonly known as: LIPITOR   Take 1 Tablet by mouth every evening.  Dose: 20 mg     furosemide 40 MG Tabs  Commonly known  "as: LASIX   Take 1 Tablet by mouth 2 times a day.  Dose: 40 mg     Jardiance 10 MG Tabs tablet  Generic drug: Empagliflozin   Take 1 Tablet by mouth every day.  Dose: 10 mg     losartan 25 MG Tabs  Commonly known as: COZAAR   Take 1 Tablet by mouth every day.  Dose: 25 mg     metoprolol SR 25 MG Tb24  Commonly known as: TOPROL XL   Take 1 Tablet by mouth every day.  Dose: 25 mg     potassium chloride SA 20 MEQ Tbcr  Commonly known as: Kdur   Take 2 Tablets by mouth 2 times a day.  Dose: 40 mEq     spironolactone 25 MG Tabs  Commonly known as: ALDACTONE   Take 1 Tablet by mouth every day.  Dose: 25 mg            CONTINUE taking these medications        Instructions   Melatonin 10 MG Tabs   Take 10 mg by mouth at bedtime.  Dose: 10 mg              Allergies  Allergies   Allergen Reactions    Toradol [Ketorolac Tromethamine]      Patient reports \"severe headache for 2 hours\" post administration    Codeine Vomiting    Iodine Anaphylaxis     Reaction took place during CT and IV dye injection       DIET  Fluid restriction 2 L, sodium restriction 1.5 g    ACTIVITY  As tolerated.  Weight bearing as tolerated    CONSULTATIONS  Cardiology--Dr. Simpson    PROCEDURES  None    LABORATORY  Lab Results   Component Value Date    SODIUM 137 03/01/2023    POTASSIUM 4.2 03/01/2023    CHLORIDE 102 03/01/2023    CO2 24 03/01/2023    GLUCOSE 129 (H) 03/01/2023    BUN 25 (H) 03/01/2023    CREATININE 0.79 03/01/2023    CREATININE 0.7 04/06/2008        Lab Results   Component Value Date    WBC 5.8 02/28/2023    HEMOGLOBIN 15.7 02/28/2023    HEMATOCRIT 44.7 02/28/2023    PLATELETCT 236 02/28/2023        Total time of the discharge process exceeds 65 minutes.  "

## 2023-03-04 LAB
BACTERIA BLD CULT: NORMAL
BACTERIA BLD CULT: NORMAL
SIGNIFICANT IND 70042: NORMAL
SIGNIFICANT IND 70042: NORMAL
SITE SITE: NORMAL
SITE SITE: NORMAL
SOURCE SOURCE: NORMAL
SOURCE SOURCE: NORMAL

## (undated) DEVICE — SODIUM CHL IRRIGATION 0.9% 1000ML (12EA/CA)

## (undated) DEVICE — PACK MAJOR ORTHO - (2EA/CA)

## (undated) DEVICE — TOWEL STOP TIMEOUT SAFETY FLAG (40EA/CA)

## (undated) DEVICE — HEAD HOLDER JUNIOR/ADULT

## (undated) DEVICE — GLOVE BIOGEL SZ 7.5 SURGICAL PF LTX - (50PR/BX 4BX/CA)

## (undated) DEVICE — MASK, LARYNGEAL AIRWAY #4

## (undated) DEVICE — GLOVE, LITE (PAIR)

## (undated) DEVICE — WATER IRRIGATION STERILE 1000ML (12EA/CA)

## (undated) DEVICE — DRAPE LOWER EXTREMETY - (6/CA)

## (undated) DEVICE — KIT ANESTHESIA W/CIRCUIT & 3/LT BAG W/FILTER (20EA/CA)

## (undated) DEVICE — ELECTRODE DUAL RETURN W/ CORD - (50/PK)

## (undated) DEVICE — Device

## (undated) DEVICE — GLOVE BIOGEL INDICATOR SZ 8 SURGICAL PF LTX - (50/BX 4BX/CA)

## (undated) DEVICE — NEPTUNE 4 PORT MANIFOLD - (20/PK)

## (undated) DEVICE — CANISTER SUCTION RIGID RED 1500CC (40EA/CA)

## (undated) DEVICE — TUBE CONNECTING SUCTION - CLEAR PLASTIC STERILE 72 IN (50EA/CA)

## (undated) DEVICE — STAPLER SKIN DISP - (6/BX 10BX/CA) VISISTAT

## (undated) DEVICE — GOWN WARMING STANDARD FLEX - (30/CA)

## (undated) DEVICE — BANDAGEESMARK BLUE 6X9' LF - 20/CS"

## (undated) DEVICE — BAG SPONGE COUNT 10.25 X 32 - BLUE (250/CA)

## (undated) DEVICE — LACTATED RINGERS INJ 1000 ML - (14EA/CA 60CA/PF)

## (undated) DEVICE — PROTECTOR ULNA NERVE - (36PR/CA)

## (undated) DEVICE — ELECTRODE 850 FOAM ADHESIVE - HYDROGEL RADIOTRNSPRNT (50/PK)

## (undated) DEVICE — HUMID-VENT HEAT AND MOISTURE EXCHANGE- (50/BX)

## (undated) DEVICE — SUTURE 0 VICRYL PLUS CT-1 - 8 X 18 INCH (12/BX)

## (undated) DEVICE — SUTURE GENERAL

## (undated) DEVICE — MASK ANESTHESIA ADULT  - (100/CA)

## (undated) DEVICE — SENSOR SPO2 NEO LNCS ADHESIVE (20/BX) SEE USER NOTES

## (undated) DEVICE — SUCTION INSTRUMENT YANKAUER BULBOUS TIP W/O VENT (50EA/CA)